# Patient Record
Sex: MALE | Race: WHITE | NOT HISPANIC OR LATINO | Employment: OTHER | URBAN - METROPOLITAN AREA
[De-identification: names, ages, dates, MRNs, and addresses within clinical notes are randomized per-mention and may not be internally consistent; named-entity substitution may affect disease eponyms.]

---

## 2017-01-03 ENCOUNTER — LAB VISIT (OUTPATIENT)
Dept: LAB | Facility: HOSPITAL | Age: 67
End: 2017-01-03
Attending: PHYSICIAN ASSISTANT
Payer: MEDICARE

## 2017-01-03 ENCOUNTER — HOSPITAL ENCOUNTER (OUTPATIENT)
Dept: RADIOLOGY | Facility: HOSPITAL | Age: 67
Discharge: HOME | End: 2017-01-03
Attending: INTERNAL MEDICINE
Payer: MEDICARE

## 2017-01-03 ENCOUNTER — INFUSION (OUTPATIENT)
Dept: RHEUMATOLOGY | Facility: HOSPITAL | Age: 67
End: 2017-01-03
Attending: INTERNAL MEDICINE
Payer: MEDICARE

## 2017-01-03 VITALS
TEMPERATURE: 97 F | SYSTOLIC BLOOD PRESSURE: 141 MMHG | HEART RATE: 51 BPM | WEIGHT: 224.88 LBS | DIASTOLIC BLOOD PRESSURE: 87 MMHG | BODY MASS INDEX: 35.22 KG/M2

## 2017-01-03 DIAGNOSIS — Z51.81 MEDICATION MONITORING ENCOUNTER: Chronic | ICD-10-CM

## 2017-01-03 DIAGNOSIS — M06.09 RHEUMATOID ARTHRITIS OF MULTIPLE SITES WITH NEGATIVE RHEUMATOID FACTOR: Primary | ICD-10-CM

## 2017-01-03 DIAGNOSIS — D84.9 IMMUNOCOMPROMISED PATIENT: Chronic | ICD-10-CM

## 2017-01-03 DIAGNOSIS — M06.09 RHEUMATOID ARTHRITIS OF MULTIPLE SITES WITH NEGATIVE RHEUMATOID FACTOR: Chronic | ICD-10-CM

## 2017-01-03 LAB
BASOPHILS # BLD AUTO: 0.03 K/UL
BASOPHILS NFR BLD: 0.6 %
DIFFERENTIAL METHOD: ABNORMAL
EOSINOPHIL # BLD AUTO: 0.1 K/UL
EOSINOPHIL NFR BLD: 2.7 %
ERYTHROCYTE [DISTWIDTH] IN BLOOD BY AUTOMATED COUNT: 13.4 %
HCT VFR BLD AUTO: 40.6 %
HGB BLD-MCNC: 14.2 G/DL
LYMPHOCYTES # BLD AUTO: 1.3 K/UL
LYMPHOCYTES NFR BLD: 25.7 %
MCH RBC QN AUTO: 32 PG
MCHC RBC AUTO-ENTMCNC: 35 %
MCV RBC AUTO: 91 FL
MONOCYTES # BLD AUTO: 0.7 K/UL
MONOCYTES NFR BLD: 12.8 %
NEUTROPHILS # BLD AUTO: 3 K/UL
NEUTROPHILS NFR BLD: 58.2 %
PLATELET # BLD AUTO: 197 K/UL
PMV BLD AUTO: 11.2 FL
RBC # BLD AUTO: 4.44 M/UL
WBC # BLD AUTO: 5.17 K/UL

## 2017-01-03 PROCEDURE — 73130 X-RAY EXAM OF HAND: CPT | Mod: 26,50,, | Performed by: RADIOLOGY

## 2017-01-03 PROCEDURE — 85025 COMPLETE CBC W/AUTO DIFF WBC: CPT | Mod: PO

## 2017-01-03 PROCEDURE — 73630 X-RAY EXAM OF FOOT: CPT | Mod: 26,50,, | Performed by: RADIOLOGY

## 2017-01-03 PROCEDURE — 36415 COLL VENOUS BLD VENIPUNCTURE: CPT | Mod: PO

## 2017-01-03 RX ORDER — SODIUM CHLORIDE 0.9 % (FLUSH) 0.9 %
10 SYRINGE (ML) INJECTION
Status: CANCELLED | OUTPATIENT
Start: 2017-01-03

## 2017-01-03 RX ORDER — SODIUM CHLORIDE 0.9 % (FLUSH) 0.9 %
10 SYRINGE (ML) INJECTION
Status: DISCONTINUED | OUTPATIENT
Start: 2017-01-03 | End: 2017-01-03 | Stop reason: HOSPADM

## 2017-01-03 RX ADMIN — SODIUM CHLORIDE, PRESERVATIVE FREE 10 ML: 5 INJECTION INTRAVENOUS at 10:01

## 2017-01-03 RX ADMIN — SODIUM CHLORIDE 1000 MG: 9 INJECTION, SOLUTION INTRAVENOUS at 10:01

## 2017-01-03 NOTE — PROGRESS NOTES
Infusion# >10  S/S infection noted or voiced? No  Recent labs? 1/3/16    Premeds? None    Orencia 1000 mg administered IV at a 30 minute rate per orders; see MAR and vitals for more  details. Tolerated well without adverse events, discharged and ambulatory out of clinic.

## 2017-01-03 NOTE — PLAN OF CARE
Problem: General Plan of Care  Goal: Plan of Care Review  The patient and/or their representative will communicate an understanding of their plan of care.   1. Psoriatic Arthritis  2. Patient request IV in hand   Outcome: Ongoing (interventions implemented as appropriate)  Pt. Verbalized understanding of care plan

## 2017-01-31 ENCOUNTER — INFUSION (OUTPATIENT)
Dept: RHEUMATOLOGY | Facility: HOSPITAL | Age: 67
End: 2017-01-31
Attending: PHYSICIAN ASSISTANT
Payer: MEDICARE

## 2017-01-31 VITALS
HEART RATE: 56 BPM | DIASTOLIC BLOOD PRESSURE: 80 MMHG | RESPIRATION RATE: 18 BRPM | WEIGHT: 227.94 LBS | BODY MASS INDEX: 35.7 KG/M2 | TEMPERATURE: 97 F | SYSTOLIC BLOOD PRESSURE: 128 MMHG

## 2017-01-31 DIAGNOSIS — M06.09 RHEUMATOID ARTHRITIS OF MULTIPLE SITES WITH NEGATIVE RHEUMATOID FACTOR: Primary | ICD-10-CM

## 2017-01-31 PROCEDURE — 25000003 PHARM REV CODE 250: Mod: PO | Performed by: PHYSICIAN ASSISTANT

## 2017-01-31 PROCEDURE — 63600175 PHARM REV CODE 636 W HCPCS: Mod: PO | Performed by: PHYSICIAN ASSISTANT

## 2017-01-31 PROCEDURE — 96413 CHEMO IV INFUSION 1 HR: CPT | Mod: PO

## 2017-01-31 RX ORDER — SODIUM CHLORIDE 0.9 % (FLUSH) 0.9 %
10 SYRINGE (ML) INJECTION
Status: CANCELLED | OUTPATIENT
Start: 2017-01-31

## 2017-01-31 RX ORDER — SODIUM CHLORIDE 0.9 % (FLUSH) 0.9 %
10 SYRINGE (ML) INJECTION
Status: DISCONTINUED | OUTPATIENT
Start: 2017-01-31 | End: 2017-01-31 | Stop reason: HOSPADM

## 2017-01-31 RX ADMIN — SODIUM CHLORIDE 1000 MG: 9 INJECTION, SOLUTION INTRAVENOUS at 08:01

## 2017-01-31 RX ADMIN — SODIUM CHLORIDE, PRESERVATIVE FREE 10 ML: 5 INJECTION INTRAVENOUS at 08:01

## 2017-01-31 NOTE — PLAN OF CARE
Problem: General Plan of Care  Goal: Plan of Care Review  The patient and/or their representative will communicate an understanding of their plan of care.   1. Psoriatic Arthritis  2. Patient request IV in hand   Outcome: Ongoing (interventions implemented as appropriate)  Pt verbalized understanding of POT

## 2017-01-31 NOTE — MR AVS SNAPSHOT
[facility]  [address] [place] [address]  Phone: [phone]  Fax: [phone]                  Thomas Sarkar   2017 8:30 AM   Infusion    Description:  Male : 1950   Provider:  RHEUMATOLOGY, INFUSION   Department:  [facility]           Diagnoses this Visit        Comments    Rheumatoid arthritis of multiple sites with negative rheumatoid factor    -  Primary            To Do List           Future Appointments        Provider Department Dept Phone    2017 7:55 AM LABORATORY, [place] [facility] [phone]    2017 8:30 AM Juanita Beckham PA-C [facility] Rheumatology [phone]    2017 9:00 AM RHEUMATOLOGY, INFUSION [facility] [phone]    3/28/2017 8:30 AM RHEUMATOLOGY, INFUSION [facility] [phone]      Goals (5 Years of Data)     None      [facility]     [facility] -  Assistance  Registered nurses in the [facility] provide clinical advisement, health education, appointment booking, and other advisory services.  Call for this free service at [phone].             Medications           Message regarding Medications     Verify the changes and/or additions to your medication regime listed below are the same as discussed with your clinician today.  If any of these changes or additions are incorrect, please notify your healthcare provider.        These medications were administered today        Dose Freq    sodium chloride 0.9% flush 10 mL 10 mL As needed (PRN)    Sig: Inject 10 mLs into the vein as needed for Line Care.    Class: Normal    Route: Intravenous    Non-formulary Exception Code: Specific indication for non-formulary alternative    abatacept (with maltose) (ORENCIA) 1,000 mg in sodium chloride 0.9% 100 mL IVPB 1,000 mg Once    Sig: Inject 1,000 mg into the vein once.    Class: Normal    Route: Intravenous  "          Verify that the below list of medications is an accurate representation of the medications you are currently taking.  If none reported, the list may be blank. If incorrect, please contact your healthcare provider. Carry this list with you in case of emergency.           Current Medications     aspirin (ECOTRIN) 81 MG EC tablet Take 81 mg by mouth once daily.    BD LUER-CECILIA SYRINGE 3 mL 26 x 5/8" Syrg USE TO INJECT 0.8 ML (20 MG ) OF METHOTREXATE UNDER THE SKIN EVERY 7 DAYS    CRESTOR 20 mg tablet 1 tablet.    fish oil-omega-3 fatty acids 300-1,000 mg capsule Take 1 g by mouth once daily.    folic acid (FOLVITE) 1 MG tablet Take 1 tablet (1,000 mcg total) by mouth once daily.    gabapentin (NEURONTIN) 300 MG capsule Take 1 capsule (300 mg total) by mouth 3 (three) times daily.    gabapentin (NEURONTIN) 300 MG capsule TAKE 1 CAPSULE THREE TIMES A DAY    hydrocodone-acetaminophen 7.5-325mg (NORCO) 7.5-325 mg per tablet 1 tablet as needed.    methotrexate 25 mg/mL injection INJECT 0.8 ML (20 MG TOTAL) INTO THE SKIN EVERY 7 DAYS    methylcellulose oral powder Take 3.4 g by mouth once daily.    metoprolol succinate (TOPROL-XL) 50 MG 24 hr tablet 1 tablet.    minocycline (MINOCIN,DYNACIN) 50 MG Cap     multivitamin with minerals tablet Take 1 tablet by mouth once daily.    mupirocin (BACTROBAN) 2 % ointment     pantoprazole (PROTONIX) 40 MG tablet 40 mg once daily.    RESTASIS 0.05 % ophthalmic emulsion     TAMSULOSIN HCL (FLOMAX ORAL) Take by mouth.    triamcinolone acetonide 0.025% (KENALOG) 0.025 % cream Apply topically 2 (two) times daily.           Clinical Reference Information           Vital Signs - Last Recorded  Most recent update: 1/31/2017  8:33 AM by Kiah Martinez RN    BP Pulse Temp Resp Wt BMI    126/75 61 97 °F (36.1 °C) 18 103.4 kg (227 lb 15.3 oz) 35.7 kg/m2      Allergies as of 1/31/2017     Bactrim [Sulfamethoxazole-trimethoprim]    Iodinated Contrast Media - Iv Dye    Prednisone    " Tetanus Vaccines And Toxoid    Corticosteroids (Glucocorticoids)      Immunizations Administered on Date of Encounter - 1/31/2017     None      Orders Placed During Today's Visit      Normal Orders This Visit    Medication Pre-Authorization     Nursing communication     Nursing communication     Saline lock IV       Administrations This Visit     abatacept (with maltose) (ORENCIA) 1,000 mg in sodium chloride 0.9% 100 mL IVPB     Admin Date Action Dose Rate Route Administered By          01/31/2017 New Bag 1000 mg 200 mL/hr Intravenous Kiah Martinez, CHRISTA                   sodium chloride 0.9% flush 10 mL     Admin Date Action Dose Route Administered By             01/31/2017 Given 10 mL Intravenous Kiah Martinez, CHRISTA                      Instructions      Abatacept Solution for injection  What is this medicine?  ABATACEPT (a ba TA sept) is used to treat moderate to severe active rheumatoid arthritis in adults. This medicine is also used to treat juvenile idiopathic arthritis.  This medicine may be used for other purposes; ask your health care provider or pharmacist if you have questions.  What should I tell my health care provider before I take this medicine?  They need to know if you have any of these conditions:  · are taking other medicines to treat rheumatoid arthritis  · COPD  · diabetes  · infection or history of infections  · recently received or scheduled to receive a vaccine  · scheduled to have surgery  · tuberculosis, a positive skin test for tuberculosis or have recently been in close contact with someone who has tuberculosis  · viral hepatitis  · an unusual or allergic reaction to abatacept, other medicines, foods, dyes, or preservatives  · pregnant or trying to get pregnant  · breast-feeding  How should I use this medicine?  This medicine is for infusion into a vein or for injection under the skin. Infusions are given by a health care professional in a hospital or clinic setting. If you are to  give your own medicine at home, you will be taught how to prepare and give this medicine under the skin. Use exactly as directed. Take your medicine at regular intervals. Do not take your medicine more often than directed.  It is important that you put your used needles and syringes in a special sharps container. Do not put them in a trash can. If you do not have a sharps container, call your pharmacist or healthcare provider to get one.  Talk to your pediatrician regarding the use of this medicine in children. While infusions in a clinic may be prescribed for children as young as 6 years for selected conditions, precautions do apply.  Overdosage: If you think you've taken too much of this medicine contact a poison control center or emergency room at once.  NOTE: This medicine is only for you. Do not share this medicine with others.  What if I miss a dose?  This medicine is used once a week if given by injection under the skin. If you miss a dose, take it as soon as you can. If it is almost time for your next dose, take only that dose. Do not take double or extra doses.  If you are to be given an infusion, it is important not to miss your dose. Doses are usually every 4 weeks. Call your doctor or health care professional if you are unable to keep an appointment.  What may interact with this medicine?  Do not take this medicine with any of the following medications:  · adalimumab  · anakinra  · certolizumab  · etanercept  · golimumab  · infliximab  · live virus vaccines  · rituximab  · tocilizumab  This medicine may also interact with the following medications:  · vaccines  This list may not describe all possible interactions. Give your health care provider a list of all the medicines, herbs, non-prescription drugs, or dietary supplements you use. Also tell them if you smoke, drink alcohol, or use illegal drugs. Some items may interact with your medicine.  What should I watch for while using this medicine?  Visit  your doctor for regular check ups while you are taking this medicine. Tell your doctor or healthcare professional if your symptoms do not start to get better or if they get worse.  Call your doctor or health care professional if you get a cold or other infection while receiving this medicine. Do not treat yourself. This medicine may decrease your body's ability to fight infection. Try to avoid being around people who are sick.  What side effects may I notice from receiving this medicine?  Side effects that you should report to your doctor or health care professional as soon as possible:  · allergic reactions like skin rash, itching or hives, swelling of the face, lips, or tongue  · breathing problems  · chest pain  · signs of infection - fever or chills, cough, unusual tiredness, pain or trouble passing urine, or warm, red or painful skin  Side effects that usually do not require medical attention (Report these to your doctor or health care professional if they continue or are bothersome.):  · dizziness  · headache  · nausea, vomiting  · sore throat  · stomach upset  This list may not describe all possible side effects. Call your doctor for medical advice about side effects. You may report side effects to FDA at 5-396-FDA-4798.  Where should I keep my medicine?  Infusions will be given in a hospital or clinic and will not be stored at home.  Storage for syringes given under the skin and stored at home:  Keep out of the reach of children. Store in a refrigerator between 2 and 8 degrees C (36 and 46 degrees F). Keep this medicine in the original container. Protect from light. Do not freeze. Throw away any unused medicine after the expiration date.  NOTE: This sheet is a summary. It may not cover all possible information. If you have questions about this medicine, talk to your doctor, pharmacist, or health care provider.  NOTE:This sheet is a summary. It may not cover all possible information. If you have questions  about this medicine, talk to your doctor, pharmacist, or health care provider. Copyright© 2016 [employer]        Abatacept Solution for injection  What is this medicine?  ABATACEPT (a ba TA sept) is used to treat moderate to severe active rheumatoid arthritis in adults. This medicine is also used to treat juvenile idiopathic arthritis.  This medicine may be used for other purposes; ask your health care provider or pharmacist if you have questions.  What should I tell my health care provider before I take this medicine?  They need to know if you have any of these conditions:  · are taking other medicines to treat rheumatoid arthritis  · COPD  · diabetes  · infection or history of infections  · recently received or scheduled to receive a vaccine  · scheduled to have surgery  · tuberculosis, a positive skin test for tuberculosis or have recently been in close contact with someone who has tuberculosis  · viral hepatitis  · an unusual or allergic reaction to abatacept, other medicines, foods, dyes, or preservatives  · pregnant or trying to get pregnant  · breast-feeding  How should I use this medicine?  This medicine is for infusion into a vein or for injection under the skin. Infusions are given by a health care professional in a hospital or clinic setting. If you are to give your own medicine at home, you will be taught how to prepare and give this medicine under the skin. Use exactly as directed. Take your medicine at regular intervals. Do not take your medicine more often than directed.  It is important that you put your used needles and syringes in a special sharps container. Do not put them in a trash can. If you do not have a sharps container, call your pharmacist or healthcare provider to get one.  Talk to your pediatrician regarding the use of this medicine in children. While infusions in a clinic may be prescribed for children as young as 6 years for selected conditions, precautions do apply.  Overdosage: If  you think you've taken too much of this medicine contact a poison control center or emergency room at once.  NOTE: This medicine is only for you. Do not share this medicine with others.  What if I miss a dose?  This medicine is used once a week if given by injection under the skin. If you miss a dose, take it as soon as you can. If it is almost time for your next dose, take only that dose. Do not take double or extra doses.  If you are to be given an infusion, it is important not to miss your dose. Doses are usually every 4 weeks. Call your doctor or health care professional if you are unable to keep an appointment.  What may interact with this medicine?  Do not take this medicine with any of the following medications:  · adalimumab  · anakinra  · certolizumab  · etanercept  · golimumab  · infliximab  · live virus vaccines  · rituximab  · tocilizumab  This medicine may also interact with the following medications:  · vaccines  This list may not describe all possible interactions. Give your health care provider a list of all the medicines, herbs, non-prescription drugs, or dietary supplements you use. Also tell them if you smoke, drink alcohol, or use illegal drugs. Some items may interact with your medicine.  What should I watch for while using this medicine?  Visit your doctor for regular check ups while you are taking this medicine. Tell your doctor or healthcare professional if your symptoms do not start to get better or if they get worse.  Call your doctor or health care professional if you get a cold or other infection while receiving this medicine. Do not treat yourself. This medicine may decrease your body's ability to fight infection. Try to avoid being around people who are sick.  What side effects may I notice from receiving this medicine?  Side effects that you should report to your doctor or health care professional as soon as possible:  · allergic reactions like skin rash, itching or hives, swelling of  the face, lips, or tongue  · breathing problems  · chest pain  · signs of infection - fever or chills, cough, unusual tiredness, pain or trouble passing urine, or warm, red or painful skin  Side effects that usually do not require medical attention (Report these to your doctor or health care professional if they continue or are bothersome.):  · dizziness  · headache  · nausea, vomiting  · sore throat  · stomach upset  This list may not describe all possible side effects. Call your doctor for medical advice about side effects. You may report side effects to FDA at 1-797-FDA-6391.  Where should I keep my medicine?  Infusions will be given in a hospital or clinic and will not be stored at home.  Storage for syringes given under the skin and stored at home:  Keep out of the reach of children. Store in a refrigerator between 2 and 8 degrees C (36 and 46 degrees F). Keep this medicine in the original container. Protect from light. Do not freeze. Throw away any unused medicine after the expiration date.  NOTE: This sheet is a summary. It may not cover all possible information. If you have questions about this medicine, talk to your doctor, pharmacist, or health care provider.  NOTE:This sheet is a summary. It may not cover all possible information. If you have questions about this medicine, talk to your doctor, pharmacist, or health care provider. Copyright© 2016 [employer]

## 2017-01-31 NOTE — PROGRESS NOTES
Infusion# >10  S/S infection noted or voiced? None noted/denied  Recent labs? yes    Premeds? none    Orencia 1000 mg administered IV at a 30 minute rate per orders; see MAR and vitals for more  details. Tolerated well without adverse events, discharged and ambulatory out of clinic.

## 2017-01-31 NOTE — PATIENT INSTRUCTIONS
Abatacept Solution for injection  What is this medicine?  ABATACEPT (a ba TA sept) is used to treat moderate to severe active rheumatoid arthritis in adults. This medicine is also used to treat juvenile idiopathic arthritis.  This medicine may be used for other purposes; ask your health care provider or pharmacist if you have questions.  What should I tell my health care provider before I take this medicine?  They need to know if you have any of these conditions:  · are taking other medicines to treat rheumatoid arthritis  · COPD  · diabetes  · infection or history of infections  · recently received or scheduled to receive a vaccine  · scheduled to have surgery  · tuberculosis, a positive skin test for tuberculosis or have recently been in close contact with someone who has tuberculosis  · viral hepatitis  · an unusual or allergic reaction to abatacept, other medicines, foods, dyes, or preservatives  · pregnant or trying to get pregnant  · breast-feeding  How should I use this medicine?  This medicine is for infusion into a vein or for injection under the skin. Infusions are given by a health care professional in a hospital or clinic setting. If you are to give your own medicine at home, you will be taught how to prepare and give this medicine under the skin. Use exactly as directed. Take your medicine at regular intervals. Do not take your medicine more often than directed.  It is important that you put your used needles and syringes in a special sharps container. Do not put them in a trash can. If you do not have a sharps container, call your pharmacist or healthcare provider to get one.  Talk to your pediatrician regarding the use of this medicine in children. While infusions in a clinic may be prescribed for children as young as 6 years for selected conditions, precautions do apply.  Overdosage: If you think you've taken too much of this medicine contact a poison control center or emergency room at  once.  NOTE: This medicine is only for you. Do not share this medicine with others.  What if I miss a dose?  This medicine is used once a week if given by injection under the skin. If you miss a dose, take it as soon as you can. If it is almost time for your next dose, take only that dose. Do not take double or extra doses.  If you are to be given an infusion, it is important not to miss your dose. Doses are usually every 4 weeks. Call your doctor or health care professional if you are unable to keep an appointment.  What may interact with this medicine?  Do not take this medicine with any of the following medications:  · adalimumab  · anakinra  · certolizumab  · etanercept  · golimumab  · infliximab  · live virus vaccines  · rituximab  · tocilizumab  This medicine may also interact with the following medications:  · vaccines  This list may not describe all possible interactions. Give your health care provider a list of all the medicines, herbs, non-prescription drugs, or dietary supplements you use. Also tell them if you smoke, drink alcohol, or use illegal drugs. Some items may interact with your medicine.  What should I watch for while using this medicine?  Visit your doctor for regular check ups while you are taking this medicine. Tell your doctor or healthcare professional if your symptoms do not start to get better or if they get worse.  Call your doctor or health care professional if you get a cold or other infection while receiving this medicine. Do not treat yourself. This medicine may decrease your body's ability to fight infection. Try to avoid being around people who are sick.  What side effects may I notice from receiving this medicine?  Side effects that you should report to your doctor or health care professional as soon as possible:  · allergic reactions like skin rash, itching or hives, swelling of the face, lips, or tongue  · breathing problems  · chest pain  · signs of infection - fever or chills,  cough, unusual tiredness, pain or trouble passing urine, or warm, red or painful skin  Side effects that usually do not require medical attention (Report these to your doctor or health care professional if they continue or are bothersome.):  · dizziness  · headache  · nausea, vomiting  · sore throat  · stomach upset  This list may not describe all possible side effects. Call your doctor for medical advice about side effects. You may report side effects to FDA at 2-614-BPC-3508.  Where should I keep my medicine?  Infusions will be given in a hospital or clinic and will not be stored at home.  Storage for syringes given under the skin and stored at home:  Keep out of the reach of children. Store in a refrigerator between 2 and 8 degrees C (36 and 46 degrees F). Keep this medicine in the original container. Protect from light. Do not freeze. Throw away any unused medicine after the expiration date.  NOTE: This sheet is a summary. It may not cover all possible information. If you have questions about this medicine, talk to your doctor, pharmacist, or health care provider.  NOTE:This sheet is a summary. It may not cover all possible information. If you have questions about this medicine, talk to your doctor, pharmacist, or health care provider. Copyright© 2016 [employer]        Abatacept Solution for injection  What is this medicine?  ABATACEPT (a ba TA sept) is used to treat moderate to severe active rheumatoid arthritis in adults. This medicine is also used to treat juvenile idiopathic arthritis.  This medicine may be used for other purposes; ask your health care provider or pharmacist if you have questions.  What should I tell my health care provider before I take this medicine?  They need to know if you have any of these conditions:  · are taking other medicines to treat rheumatoid arthritis  · COPD  · diabetes  · infection or history of infections  · recently received or scheduled to receive a  vaccine  · scheduled to have surgery  · tuberculosis, a positive skin test for tuberculosis or have recently been in close contact with someone who has tuberculosis  · viral hepatitis  · an unusual or allergic reaction to abatacept, other medicines, foods, dyes, or preservatives  · pregnant or trying to get pregnant  · breast-feeding  How should I use this medicine?  This medicine is for infusion into a vein or for injection under the skin. Infusions are given by a health care professional in a hospital or clinic setting. If you are to give your own medicine at home, you will be taught how to prepare and give this medicine under the skin. Use exactly as directed. Take your medicine at regular intervals. Do not take your medicine more often than directed.  It is important that you put your used needles and syringes in a special sharps container. Do not put them in a trash can. If you do not have a sharps container, call your pharmacist or healthcare provider to get one.  Talk to your pediatrician regarding the use of this medicine in children. While infusions in a clinic may be prescribed for children as young as 6 years for selected conditions, precautions do apply.  Overdosage: If you think you've taken too much of this medicine contact a poison control center or emergency room at once.  NOTE: This medicine is only for you. Do not share this medicine with others.  What if I miss a dose?  This medicine is used once a week if given by injection under the skin. If you miss a dose, take it as soon as you can. If it is almost time for your next dose, take only that dose. Do not take double or extra doses.  If you are to be given an infusion, it is important not to miss your dose. Doses are usually every 4 weeks. Call your doctor or health care professional if you are unable to keep an appointment.  What may interact with this medicine?  Do not take this medicine with any of the following  medications:  · adalimumab  · anakinra  · certolizumab  · etanercept  · golimumab  · infliximab  · live virus vaccines  · rituximab  · tocilizumab  This medicine may also interact with the following medications:  · vaccines  This list may not describe all possible interactions. Give your health care provider a list of all the medicines, herbs, non-prescription drugs, or dietary supplements you use. Also tell them if you smoke, drink alcohol, or use illegal drugs. Some items may interact with your medicine.  What should I watch for while using this medicine?  Visit your doctor for regular check ups while you are taking this medicine. Tell your doctor or healthcare professional if your symptoms do not start to get better or if they get worse.  Call your doctor or health care professional if you get a cold or other infection while receiving this medicine. Do not treat yourself. This medicine may decrease your body's ability to fight infection. Try to avoid being around people who are sick.  What side effects may I notice from receiving this medicine?  Side effects that you should report to your doctor or health care professional as soon as possible:  · allergic reactions like skin rash, itching or hives, swelling of the face, lips, or tongue  · breathing problems  · chest pain  · signs of infection - fever or chills, cough, unusual tiredness, pain or trouble passing urine, or warm, red or painful skin  Side effects that usually do not require medical attention (Report these to your doctor or health care professional if they continue or are bothersome.):  · dizziness  · headache  · nausea, vomiting  · sore throat  · stomach upset  This list may not describe all possible side effects. Call your doctor for medical advice about side effects. You may report side effects to FDA at 6-893-FDA-9936.  Where should I keep my medicine?  Infusions will be given in a hospital or clinic and will not be stored at home.  Storage for  syringes given under the skin and stored at home:  Keep out of the reach of children. Store in a refrigerator between 2 and 8 degrees C (36 and 46 degrees F). Keep this medicine in the original container. Protect from light. Do not freeze. Throw away any unused medicine after the expiration date.  NOTE: This sheet is a summary. It may not cover all possible information. If you have questions about this medicine, talk to your doctor, pharmacist, or health care provider.  NOTE:This sheet is a summary. It may not cover all possible information. If you have questions about this medicine, talk to your doctor, pharmacist, or health care provider. Copyright© 2016 [employer] Standard

## 2017-02-06 RX ORDER — FOLIC ACID 1 MG/1
TABLET ORAL
Qty: 90 TABLET | Refills: 3 | Status: SHIPPED | OUTPATIENT
Start: 2017-02-06 | End: 2018-02-13 | Stop reason: SDUPTHER

## 2017-02-28 ENCOUNTER — INFUSION (OUTPATIENT)
Dept: RHEUMATOLOGY | Facility: HOSPITAL | Age: 67
End: 2017-02-28
Attending: PHYSICIAN ASSISTANT
Payer: MEDICARE

## 2017-02-28 ENCOUNTER — OFFICE VISIT (OUTPATIENT)
Dept: RHEUMATOLOGY | Facility: CLINIC | Age: 67
End: 2017-02-28
Payer: MEDICARE

## 2017-02-28 ENCOUNTER — LAB VISIT (OUTPATIENT)
Dept: LAB | Facility: HOSPITAL | Age: 67
End: 2017-02-28
Attending: PHYSICIAN ASSISTANT
Payer: MEDICARE

## 2017-02-28 VITALS
SYSTOLIC BLOOD PRESSURE: 140 MMHG | WEIGHT: 226.63 LBS | DIASTOLIC BLOOD PRESSURE: 68 MMHG | HEIGHT: 67 IN | TEMPERATURE: 98 F | BODY MASS INDEX: 35.57 KG/M2 | HEART RATE: 63 BPM

## 2017-02-28 VITALS
HEART RATE: 62 BPM | WEIGHT: 226.63 LBS | BODY MASS INDEX: 35.5 KG/M2 | SYSTOLIC BLOOD PRESSURE: 143 MMHG | DIASTOLIC BLOOD PRESSURE: 85 MMHG

## 2017-02-28 DIAGNOSIS — M06.09 RHEUMATOID ARTHRITIS OF MULTIPLE SITES WITH NEGATIVE RHEUMATOID FACTOR: Chronic | ICD-10-CM

## 2017-02-28 DIAGNOSIS — K52.9 CHRONIC COLITIS: Chronic | ICD-10-CM

## 2017-02-28 DIAGNOSIS — M06.09 RHEUMATOID ARTHRITIS OF MULTIPLE SITES WITH NEGATIVE RHEUMATOID FACTOR: Primary | Chronic | ICD-10-CM

## 2017-02-28 DIAGNOSIS — E66.9 OBESITY, CLASS II, BMI 35-39.9, NO COMORBIDITY: ICD-10-CM

## 2017-02-28 DIAGNOSIS — D84.9 IMMUNOCOMPROMISED PATIENT: Chronic | ICD-10-CM

## 2017-02-28 DIAGNOSIS — L40.9 PSORIASIS: Chronic | ICD-10-CM

## 2017-02-28 DIAGNOSIS — Z51.81 MEDICATION MONITORING ENCOUNTER: Chronic | ICD-10-CM

## 2017-02-28 DIAGNOSIS — M06.09 RHEUMATOID ARTHRITIS OF MULTIPLE SITES WITH NEGATIVE RHEUMATOID FACTOR: Primary | ICD-10-CM

## 2017-02-28 DIAGNOSIS — M17.0 PRIMARY OSTEOARTHRITIS OF BOTH KNEES: ICD-10-CM

## 2017-02-28 LAB
ALBUMIN SERPL BCP-MCNC: 3.6 G/DL
ALP SERPL-CCNC: 75 U/L
ALT SERPL W/O P-5'-P-CCNC: 45 U/L
ANION GAP SERPL CALC-SCNC: 10 MMOL/L
AST SERPL-CCNC: 38 U/L
BASOPHILS # BLD AUTO: 0.03 K/UL
BASOPHILS NFR BLD: 0.6 %
BILIRUB SERPL-MCNC: 1.1 MG/DL
BUN SERPL-MCNC: 19 MG/DL
CALCIUM SERPL-MCNC: 9.2 MG/DL
CHLORIDE SERPL-SCNC: 106 MMOL/L
CO2 SERPL-SCNC: 23 MMOL/L
CREAT SERPL-MCNC: 1 MG/DL
CRP SERPL-MCNC: 2.5 MG/L
DIFFERENTIAL METHOD: ABNORMAL
EOSINOPHIL # BLD AUTO: 0.2 K/UL
EOSINOPHIL NFR BLD: 3.3 %
ERYTHROCYTE [DISTWIDTH] IN BLOOD BY AUTOMATED COUNT: 13.7 %
ERYTHROCYTE [SEDIMENTATION RATE] IN BLOOD BY WESTERGREN METHOD: 23 MM/HR
EST. GFR  (AFRICAN AMERICAN): >60 ML/MIN/1.73 M^2
EST. GFR  (NON AFRICAN AMERICAN): >60 ML/MIN/1.73 M^2
GLUCOSE SERPL-MCNC: 106 MG/DL
HCT VFR BLD AUTO: 38.7 %
HGB BLD-MCNC: 13.3 G/DL
LYMPHOCYTES # BLD AUTO: 1.2 K/UL
LYMPHOCYTES NFR BLD: 24.4 %
MCH RBC QN AUTO: 31.5 PG
MCHC RBC AUTO-ENTMCNC: 34.4 %
MCV RBC AUTO: 92 FL
MONOCYTES # BLD AUTO: 0.8 K/UL
MONOCYTES NFR BLD: 15.6 %
NEUTROPHILS # BLD AUTO: 2.7 K/UL
NEUTROPHILS NFR BLD: 56.1 %
PLATELET # BLD AUTO: 201 K/UL
PMV BLD AUTO: 10.7 FL
POTASSIUM SERPL-SCNC: 4.5 MMOL/L
PROT SERPL-MCNC: 7.3 G/DL
RBC # BLD AUTO: 4.22 M/UL
SODIUM SERPL-SCNC: 139 MMOL/L
WBC # BLD AUTO: 4.88 K/UL

## 2017-02-28 PROCEDURE — 80053 COMPREHEN METABOLIC PANEL: CPT | Mod: PO

## 2017-02-28 PROCEDURE — 99999 PR PBB SHADOW E&M-EST. PATIENT-LVL IV: CPT | Mod: PBBFAC,,, | Performed by: PHYSICIAN ASSISTANT

## 2017-02-28 PROCEDURE — 85025 COMPLETE CBC W/AUTO DIFF WBC: CPT | Mod: PO

## 2017-02-28 PROCEDURE — 36415 COLL VENOUS BLD VENIPUNCTURE: CPT | Mod: PO

## 2017-02-28 PROCEDURE — 25000003 PHARM REV CODE 250: Mod: PO | Performed by: PHYSICIAN ASSISTANT

## 2017-02-28 PROCEDURE — 86140 C-REACTIVE PROTEIN: CPT

## 2017-02-28 PROCEDURE — 99214 OFFICE O/P EST MOD 30 MIN: CPT | Mod: S$PBB,,, | Performed by: PHYSICIAN ASSISTANT

## 2017-02-28 PROCEDURE — 96413 CHEMO IV INFUSION 1 HR: CPT | Mod: PO

## 2017-02-28 PROCEDURE — 85651 RBC SED RATE NONAUTOMATED: CPT | Mod: PO

## 2017-02-28 PROCEDURE — 63600175 PHARM REV CODE 636 W HCPCS: Mod: PO | Performed by: PHYSICIAN ASSISTANT

## 2017-02-28 RX ORDER — SODIUM CHLORIDE 0.9 % (FLUSH) 0.9 %
10 SYRINGE (ML) INJECTION
Status: DISCONTINUED | OUTPATIENT
Start: 2017-02-28 | End: 2017-02-28 | Stop reason: HOSPADM

## 2017-02-28 RX ORDER — SODIUM CHLORIDE 0.9 % (FLUSH) 0.9 %
10 SYRINGE (ML) INJECTION
Status: CANCELLED | OUTPATIENT
Start: 2017-02-28

## 2017-02-28 RX ADMIN — SODIUM CHLORIDE 1000 MG: 9 INJECTION, SOLUTION INTRAVENOUS at 08:02

## 2017-02-28 RX ADMIN — Medication 10 ML: at 09:02

## 2017-02-28 ASSESSMENT — CLINICAL DISEASE ACTIVITY INDEX (CDAI)
SWOLLEN_JOINTS_COUNT: 1
PATIENT_ASSESSMENT: 3
PHYSICIAN_ASSESSMENT: 3
TOTAL_SCORE: 13
TENDER_JOINTS_COUNT: 6

## 2017-02-28 ASSESSMENT — ROUTINE ASSESSMENT OF PATIENT INDEX DATA (RAPID3): MDHAQ FUNCTION SCORE: 1.6

## 2017-02-28 NOTE — PROGRESS NOTES
Infusion# >10  S/S infection noted or voiced? no  Recent labs? Today/saw Juanita    Premeds? no    Orencia 1000 mg administered IV at a 30 minute rate per orders; see MAR and vitals for more  details. Tolerated well without adverse events, discharged and ambulatory out of clinic.

## 2017-02-28 NOTE — PROGRESS NOTES
Subjective:       Patient ID: Thomas Sarkar is a 66 y.o. male.    Chief Complaint: Rheumatoid Arthritis; Psoriasis; and Osteoarthritis      HPI Comments: Thomas is back today for rheumatology followup. We have followed thomas for seronegative RA with psoriasis overlap and chronic unspecified Colitis. He is followed by GI. His gi issues have been very quiet. His joints stanley both hands have been the predominate issue.  Failed mtx monotherapy. Failed sulfasalazine. He has failed multiple tnf agents (remicade, enbrel, humira and cimzia), now on IV Orecnica 1000 mg Q 4 weeks just over 1 year now. Also on parental mtx 20 mg (0.8 mL) once weekly and folic acid. Cannot take nsaids but was on limbrel daily. Now off due to cost of medication and medicare not willing to cover. Dry eyes now on restasis.  Overall he reports the Orencia has worked the best of all the meds. Today pain is 4/10.  Hands. Scalp psoriasis comes and goes, scalp pretty clear right now. Reports diverticulitis in the past.  previously used oral  hydrocodone 10/325  but not really helping so he stopped.  Now on gabapentin 300 mg tid to help some to reduce pain. No side effects. Had upper resp infection treated with azithromycin 5 day course. Completed treatment. Skipped his mtx last week. Feeling better. Congestion better. No fever.             Psoriasis   Associated symptoms include arthralgias, joint swelling and a rash. Pertinent negatives include no abdominal pain, chest pain, chills, congestion, coughing, fatigue, fever, nausea, numbness, vomiting or weakness.   Osteoarthritis   Associated symptoms include arthralgias, joint swelling and a rash. Pertinent negatives include no abdominal pain, chest pain, chills, congestion, coughing, fatigue, fever, nausea, numbness, vomiting or weakness.     Review of Systems   Constitutional: Negative.  Negative for chills, fatigue and fever.   HENT: Negative.  Negative for congestion, mouth sores and trouble  "swallowing.    Eyes: Negative.  Negative for photophobia, redness and visual disturbance.   Respiratory: Negative.  Negative for cough, shortness of breath and wheezing.    Cardiovascular: Negative.  Negative for chest pain and leg swelling.   Gastrointestinal: Negative.  Negative for abdominal distention, abdominal pain, anal bleeding, blood in stool, constipation, diarrhea, nausea, rectal pain and vomiting.   Genitourinary: Negative.  Negative for dysuria, flank pain, frequency and urgency.   Musculoskeletal: Positive for arthralgias and joint swelling. Negative for gait problem.   Skin: Positive for rash. Negative for color change and pallor.   Neurological: Negative.  Negative for dizziness, weakness and numbness.         Objective:     BP (!) 140/68  Pulse 63  Temp 98.2 °F (36.8 °C) (Oral)   Ht 5' 7" (1.702 m)  Wt 102.8 kg (226 lb 10.1 oz)  BMI 35.5 kg/m2     Physical Exam   Constitutional: He is oriented to person, place, and time and well-developed, well-nourished, and in no distress. No distress.   HENT:   Head: Normocephalic and atraumatic.   Right Ear: External ear normal.   Left Ear: External ear normal.   Mouth/Throat: No oropharyngeal exudate.   Eyes: Conjunctivae and EOM are normal. Pupils are equal, round, and reactive to light. No scleral icterus.   Neck: Neck supple. No thyromegaly present.   Cardiovascular: Normal rate, regular rhythm and normal heart sounds.    No murmur heard.  Pulmonary/Chest: Effort normal and breath sounds normal. No respiratory distress.   Abdominal: Soft. Bowel sounds are normal.       Right Side Rheumatological Exam     Examination finds the shoulder, elbow, wrist, knee, 1st PIP, 1st MCP, 2nd PIP, 4th PIP, 4th MCP, 5th PIP and 5th MCP normal.    The patient is tender to palpation of the 2nd MCP, 3rd PIP and 3rd MCP    Left Side Rheumatological Exam     Examination finds the shoulder, elbow, wrist, knee, 1st PIP, 1st MCP, 2nd PIP, 3rd MCP, 4th MCP and 5th MCP " normal.    The patient is tender to palpation of the 2nd MCP and 3rd PIP.      Lymphadenopathy:     He has no cervical adenopathy.   Neurological: He is alert and oriented to person, place, and time. No cranial nerve deficit. He exhibits normal muscle tone. Gait normal. Coordination normal.   Skin: Skin is warm and dry. Rash noted.          Musculoskeletal: He exhibits edema and tenderness.   Puffiness to several fingers and pip joints with tenderness noted on exam  Decreased range of motion pip joints                  Recent Results (from the past 1008 hour(s))   CBC auto differential    Collection Time: 02/28/17  7:45 AM   Result Value Ref Range    WBC 4.88 3.90 - 12.70 K/uL    RBC 4.22 (L) 4.60 - 6.20 M/uL    Hemoglobin 13.3 (L) 14.0 - 18.0 g/dL    Hematocrit 38.7 (L) 40.0 - 54.0 %    MCV 92 82 - 98 fL    MCH 31.5 (H) 27.0 - 31.0 pg    MCHC 34.4 32.0 - 36.0 %    RDW 13.7 11.5 - 14.5 %    Platelets 201 150 - 350 K/uL    MPV 10.7 9.2 - 12.9 fL    Gran # 2.7 1.8 - 7.7 K/uL    Lymph # 1.2 1.0 - 4.8 K/uL    Mono # 0.8 0.3 - 1.0 K/uL    Eos # 0.2 0.0 - 0.5 K/uL    Baso # 0.03 0.00 - 0.20 K/uL    Gran% 56.1 38.0 - 73.0 %    Lymph% 24.4 18.0 - 48.0 %    Mono% 15.6 (H) 4.0 - 15.0 %    Eosinophil% 3.3 0.0 - 8.0 %    Basophil% 0.6 0.0 - 1.9 %    Differential Method Automated          Assessment:       1. Rheumatoid arthritis of multiple sites with negative rheumatoid factor    2. Psoriasis    3. Chronic colitis    4. Immunocompromised patient    5. Medication monitoring encounter    6. Primary osteoarthritis of both knees    7. Obesity, Class II, BMI 35-39.9, no comorbidity            1.  Seronegative rheumatoid arthritis with overlapping skin psoriasis  Who has failed mtx monotheraoy, failed Enbrel, Remicade, Humira, cimzia now on IV Orencia 1000 mg every 4 weeks still ongoing activity  6 Tender/ 2 swollen - cdai 13, RENEE 1.6  2.  Skin psoriasis better on mtx and topical- minimal scalp rash today   3.  Chronic unspecified  colitis  - not UC- Stable  4.  Osteoarthritis of both knees  5.  Vaccines up to date   6.  Medication Monitoring- no current issues, no evidence of toxicity  7.  Immunocompromised no issues with recurrent infections  8.  Chronic pain on gabapentin 300 mg tid   9.  Obesity Class II      Plan:        continue mtx 0.8 mL 20 mg sub Q once weekly injection,    ok for IV Orencia 1000 mg today and continue every 4 weeks,    Keep his  gabapentin at 300 mg tid see if this helps with pain,    if fails Orencia would avoid actemra or xelzanz with his prior diverticulitis, not sure what next ? Rituxan      rtc 4, 8 and 12 weeks infusion only and 16 weeks for infusion, office visit and labs      Repeat kaushik hand and foot X-rays in 1.5-2 years    Patient counseled on weight loss  Recommended dietary changes--Mediterranean diet would be a good one to follow  Encouraged exercise  call with any questions, changes, or concerns

## 2017-02-28 NOTE — MR AVS SNAPSHOT
[facility]  [address] [place] [address]  Phone: [phone]  Fax: [phone]                  Thomas Sarkar   2017 9:00 AM   Infusion    Description:  Male : 1950   Provider:  RHEUMATOLOGY, INFUSION   Department:  [facility]           Diagnoses this Visit        Comments    Rheumatoid arthritis of multiple sites with negative rheumatoid factor    -  Primary            To Do List           Future Appointments        Provider Department Dept Phone    3/28/2017 8:30 AM RHEUMATOLOGY, INFUSION [facility] [phone]    2017 8:30 AM RHEUMATOLOGY, INFUSION [facility] [phone]    2017 8:30 AM RHEUMATOLOGY, INFUSION [facility] [phone]    2017 7:55 AM LABORATORY, [facility] [phone]    2017 8:30 AM Sebastián Douglass MD [facility] Rheumatology [phone]      Goals (5 Years of Data)     None      [facility]     [facility] -  Assistance  Registered nurses in the [facility] provide clinical advisement, health education, appointment booking, and other advisory services.  Call for this free service at [phone].             Medications           Message regarding Medications     Verify the changes and/or additions to your medication regime listed below are the same as discussed with your clinician today.  If any of these changes or additions are incorrect, please notify your healthcare provider.        These medications were administered today        Dose Freq    sodium chloride 0.9% flush 10 mL 10 mL As needed (PRN)    Sig: Inject 10 mLs into the vein as needed for Line Care.    Class: Normal    Route: Intravenous    Non-formulary Exception Code: Specific indication for non-formulary alternative    abatacept (with maltose) (ORENCIA) 1,000 mg in sodium chloride 0.9% 100 mL IVPB 1,000  "mg Once    Sig: Inject 1,000 mg into the vein once.    Class: Normal    Route: Intravenous           Verify that the below list of medications is an accurate representation of the medications you are currently taking.  If none reported, the list may be blank. If incorrect, please contact your healthcare provider. Carry this list with you in case of emergency.           Current Medications     aspirin (ECOTRIN) 81 MG EC tablet Take 81 mg by mouth once daily.    BD LUER-CECILIA SYRINGE 3 mL 26 x 5/8" Syrg USE TO INJECT 0.8 ML (20 MG ) OF METHOTREXATE UNDER THE SKIN EVERY 7 DAYS    CRESTOR 20 mg tablet 1 tablet.    fish oil-omega-3 fatty acids 300-1,000 mg capsule Take 1 g by mouth once daily.    folic acid (FOLVITE) 1 MG tablet TAKE 1 TABLET ONCE DAILY    gabapentin (NEURONTIN) 300 MG capsule Take 1 capsule (300 mg total) by mouth 3 (three) times daily.    hydrocodone-acetaminophen 7.5-325mg (NORCO) 7.5-325 mg per tablet 1 tablet as needed.    methotrexate 25 mg/mL injection INJECT 0.8 ML (20 MG TOTAL) INTO THE SKIN EVERY 7 DAYS    metoprolol succinate (TOPROL-XL) 50 MG 24 hr tablet 1 tablet.    multivitamin with minerals tablet Take 1 tablet by mouth once daily.    mupirocin (BACTROBAN) 2 % ointment     pantoprazole (PROTONIX) 40 MG tablet 40 mg once daily.    RESTASIS 0.05 % ophthalmic emulsion     TAMSULOSIN HCL (FLOMAX ORAL) Take by mouth.    triamcinolone acetonide 0.025% (KENALOG) 0.025 % cream Apply topically 2 (two) times daily.           Clinical Reference Information           Your Vitals Were     BP                   143/85           Allergies as of 2/28/2017     Bactrim [Sulfamethoxazole-trimethoprim]    Iodinated Contrast Media - Iv Dye    Prednisone    Tetanus Vaccines And Toxoid    Corticosteroids (Glucocorticoids)      Immunizations Administered on Date of Encounter - 2/28/2017     None      Orders Placed During Today's Visit      Normal Orders This Visit    Medication Pre-Authorization     "   Administrations This Visit     abatacept (with maltose) (ORENCIA) 1,000 mg in sodium chloride 0.9% 100 mL IVPB     Admin Date Action Dose Rate Route Administered By          02/28/2017 New Bag 1000 mg 200 mL/hr Intravenous Divina Lopez, RN                   sodium chloride 0.9% flush 10 mL     Admin Date Action Dose Route Administered By             02/28/2017 Given 10 mL Intravenous Divina Lopez, RN                      Language Assistance Services     ATTENTION: Language assistance services are available, free of charge. Please call [phone].      ATENCIÓN: Si habla español, tiene a [place] disposición servicios gratuitos de asistencia lingüística. Llame al [phone].     CHÚ Ý: N?u b?n nói Ti?ng Vi?t, có các d?ch v? h? tr? ngôn ng? mi?n phí dành cho b?n. G?i s? [phone].         [facility] complies with applicable Federal civil rights laws and does not discriminate on the basis of race, color, national origin, age, disability, or sex.

## 2017-02-28 NOTE — PLAN OF CARE
Problem: Patient Care Overview (Adult)  Goal: Plan of Care Review  Outcome: Ongoing (interventions implemented as appropriate)  I have generalized pain 4/4

## 2017-03-24 ENCOUNTER — TELEPHONE (OUTPATIENT)
Dept: RHEUMATOLOGY | Facility: HOSPITAL | Age: 67
End: 2017-03-24

## 2017-03-28 ENCOUNTER — INFUSION (OUTPATIENT)
Dept: RHEUMATOLOGY | Facility: HOSPITAL | Age: 67
End: 2017-03-28
Attending: PHYSICIAN ASSISTANT
Payer: MEDICARE

## 2017-03-28 VITALS
TEMPERATURE: 97 F | RESPIRATION RATE: 18 BRPM | HEIGHT: 67 IN | DIASTOLIC BLOOD PRESSURE: 70 MMHG | WEIGHT: 226.63 LBS | HEART RATE: 60 BPM | BODY MASS INDEX: 35.57 KG/M2 | SYSTOLIC BLOOD PRESSURE: 123 MMHG

## 2017-03-28 DIAGNOSIS — M06.09 RHEUMATOID ARTHRITIS OF MULTIPLE SITES WITH NEGATIVE RHEUMATOID FACTOR: Primary | ICD-10-CM

## 2017-03-28 PROCEDURE — 96413 CHEMO IV INFUSION 1 HR: CPT | Mod: PO

## 2017-03-28 PROCEDURE — 63600175 PHARM REV CODE 636 W HCPCS: Mod: PO | Performed by: PHYSICIAN ASSISTANT

## 2017-03-28 PROCEDURE — 25000003 PHARM REV CODE 250: Mod: PO | Performed by: PHYSICIAN ASSISTANT

## 2017-03-28 RX ORDER — SODIUM CHLORIDE 0.9 % (FLUSH) 0.9 %
10 SYRINGE (ML) INJECTION
Status: DISCONTINUED | OUTPATIENT
Start: 2017-03-28 | End: 2017-03-28 | Stop reason: HOSPADM

## 2017-03-28 RX ORDER — SODIUM CHLORIDE 0.9 % (FLUSH) 0.9 %
10 SYRINGE (ML) INJECTION
Status: CANCELLED | OUTPATIENT
Start: 2017-03-28

## 2017-03-28 RX ADMIN — Medication 10 ML: at 08:03

## 2017-03-28 RX ADMIN — SODIUM CHLORIDE 1000 MG: 9 INJECTION, SOLUTION INTRAVENOUS at 08:03

## 2017-03-28 NOTE — PLAN OF CARE
Problem: Infection, Risk/Actual (Adult, Obstetrics)  Goal: Infection Prevention/Resolution/Control (Infection, Risk/Actual)  Patient will demonstrate the desired outcomes.   Outcome: Ongoing (interventions implemented as appropriate)  Infection prevention discussed.

## 2017-03-28 NOTE — PATIENT INSTRUCTIONS
Abatacept Solution for injection  What is this medicine?  ABATACEPT (a ba TA sept) is used to treat moderate to severe active rheumatoid arthritis in adults. This medicine is also used to treat juvenile idiopathic arthritis.  How should I use this medicine?  This medicine is for infusion into a vein. It is given by a health care professional in a hospital or clinic setting.  Talk to your pediatrician regarding the use of this medicine in children. While this drug may be prescribed for children as young as 6 years for selected conditions, precautions do apply.  What side effects may I notice from receiving this medicine?  Side effects that you should report to your doctor or health care professional as soon as possible:  · allergic reactions like skin rash, itching or hives, swelling of the face, lips, or tongue  · breathing problems  · chest pain  · signs of infection - fever or chills, cough, unusual tiredness, pain or trouble passing urine, or warm, red or painful skin  Side effects that usually do not require medical attention (Report these to your doctor or health care professional if they continue or are bothersome.):  · dizziness  · headache  · nausea, vomiting  · sore throat  · stomach upset  What may interact with this medicine?  Do not take this medicine with any of the following medications:  · adalimumab  · anakinra  · certolizumab  · etanercept  · golimumab  · infliximab  · live virus vaccines  · rituximab  · tocilizumab  This medicine may also interact with the following medications:  · vaccines  What if I miss a dose?  It is important not to miss your dose. Call your doctor or health care professional if you are unable to keep an appointment.  Where should I keep my medicine?  This drug is given in a hospital or clinic and will not be stored at home.  What should I tell my health care provider before I take this medicine?  They need to know if you have any of these conditions:  · are taking other  medicines to treat rheumatoid arthritis  · COPD  · diabetes  · infection or history of infections  · recently received or scheduled to receive a vaccine  · scheduled to have surgery  · tuberculosis, a positive skin test for tuberculosis or have recently been in close contact with someone who has tuberculosis  · viral hepatitis  · an unusual or allergic reaction to abatacept, other medicines, foods, dyes, or preservatives  · pregnant or trying to get pregnant  · breast-feeding  What should I watch for while using this medicine?  Visit your doctor for regular check ups while you are taking this medicine. Tell your doctor or healthcare professional if your symptoms do not start to get better or if they get worse.  Call your doctor or health care professional if you get a cold or other infection while receiving this medicine. Do not treat yourself. This medicine may decrease your body's ability to fight infection. Try to avoid being around people who are sick.  Date Last Reviewed:   NOTE:This sheet is a summary. It may not cover all possible information. If you have questions about this medicine, talk to your doctor, pharmacist, or health care provider. Copyright© 2016 [employer] Standard

## 2017-03-28 NOTE — PROGRESS NOTES
Infusion # >10  S/S infection noted or voiced? no  Recent labs? 2/28/17     Premeds? no     Orencia 1000 mg administered IV at a 30 minute rate per orders; see MAR and vitals for more details. Tolerated well without adverse events, discharged and ambulatory out of clinic.

## 2017-03-28 NOTE — MR AVS SNAPSHOT
[facility]  [address] [place] [address]  Phone: [phone]  Fax: [phone]                  Thomas Sarkar   3/28/2017 8:30 AM   Infusion    Description:  Male : 1950   Provider:  RHEUMATOLOGY, INFUSION   Department:  [facility]           Diagnoses this Visit        Comments    Rheumatoid arthritis of multiple sites with negative rheumatoid factor    -  Primary            To Do List           Future Appointments        Provider Department Dept Phone    2017 8:30 AM RHEUMATOLOGY, INFUSION [facility] [phone]    2017 8:30 AM RHEUMATOLOGY, INFUSION [facility] [phone]    2017 7:55 AM LAB[facility]ATORY, [facility] [phone]    2017 8:30 AM Sebastián Douglass MD [facility] Rheumatology [phone]    2017 9:00 AM RHEUMATOLOGY, INFUSION [facility] [phone]      Goals (5 Years of Data)     None      [facility]     [facility] Nurse Care Line -  Assistance  Registered nurses in the [facility] provide clinical advisement, health education, appointment booking, and other advisory services.  Call for this free service at [phone].             Medications           Message regarding Medications     Verify the changes and/or additions to your medication regime listed below are the same as discussed with your clinician today.  If any of these changes or additions are incorrect, please notify your healthcare provider.        These medications were administered today        Dose Freq    sodium chloride 0.9% flush 10 mL 10 mL As needed (PRN)    Sig: Inject 10 mLs into the vein as needed for Line Care.    Class: Normal    Route: Intravenous    Non-formulary Exception Code: Specific indication for non-formulary alternative    abatacept (with maltose) (ORENCIA) 1,000 mg in sodium chloride 0.9% 100 mL IVPB 1,000  "mg Once    Sig: Inject 1,000 mg into the vein once.    Class: Normal    Route: Intravenous           Verify that the below list of medications is an accurate representation of the medications you are currently taking.  If none reported, the list may be blank. If incorrect, please contact your healthcare provider. Carry this list with you in case of emergency.           Current Medications     aspirin (ECOTRIN) 81 MG EC tablet Take 81 mg by mouth once daily.    BD LUER-CECILIA SYRINGE 3 mL 26 x 5/8" Syrg USE TO INJECT 0.8 ML (20 MG ) OF METHOTREXATE UNDER THE SKIN EVERY 7 DAYS    CRESTOR 20 mg tablet 1 tablet.    fish oil-omega-3 fatty acids 300-1,000 mg capsule Take 1 g by mouth once daily.    folic acid (FOLVITE) 1 MG tablet TAKE 1 TABLET ONCE DAILY    gabapentin (NEURONTIN) 300 MG capsule Take 1 capsule (300 mg total) by mouth 3 (three) times daily.    hydrocodone-acetaminophen 7.5-325mg (NORCO) 7.5-325 mg per tablet 1 tablet as needed.    methotrexate 25 mg/mL injection INJECT 0.8 ML (20 MG TOTAL) INTO THE SKIN EVERY 7 DAYS    metoprolol succinate (TOPROL-XL) 50 MG 24 hr tablet 1 tablet.    multivitamin with minerals tablet Take 1 tablet by mouth once daily.    mupirocin (BACTROBAN) 2 % ointment     pantoprazole (PROTONIX) 40 MG tablet 40 mg once daily.    RESTASIS 0.05 % ophthalmic emulsion     TAMSULOSIN HCL (FLOMAX ORAL) Take by mouth.    triamcinolone acetonide 0.025% (KENALOG) 0.025 % cream Apply topically 2 (two) times daily.           Clinical Reference Information           Your Vitals Were     BP Pulse Temp Resp Height Weight    131/75 60 97 °F (36.1 °C) 18 5' 7" (1.702 m) 102.8 kg (226 lb 10.1 oz)    BMI                35.5 kg/m2          Allergies as of 3/28/2017     Bactrim [Sulfamethoxazole-trimethoprim]    Iodinated Contrast Media - Iv Dye    Prednisone    Tetanus Vaccines And Toxoid    Corticosteroids (Glucocorticoids)      Immunizations Administered on Date of Encounter - 3/28/2017     None    "   Orders Placed During Today's Visit      Normal Orders This Visit    Medication Pre-Authorization       Administrations This Visit     sodium chloride 0.9% flush 10 mL     Admin Date Action Dose Route Administered By             03/28/2017 Given 10 mL Intravenous Sagar Pan RN                      Instructions      Abatacept Solution for injection  What is this medicine?  ABATACEPT (a ba TA sept) is used to treat moderate to severe active rheumatoid arthritis in adults. This medicine is also used to treat juvenile idiopathic arthritis.  How should I use this medicine?  This medicine is for infusion into a vein. It is given by a health care professional in a hospital or clinic setting.  Talk to your pediatrician regarding the use of this medicine in children. While this drug may be prescribed for children as young as 6 years for selected conditions, precautions do apply.  What side effects may I notice from receiving this medicine?  Side effects that you should report to your doctor or health care professional as soon as possible:  · allergic reactions like skin rash, itching or hives, swelling of the face, lips, or tongue  · breathing problems  · chest pain  · signs of infection - fever or chills, cough, unusual tiredness, pain or trouble passing urine, or warm, red or painful skin  Side effects that usually do not require medical attention (Report these to your doctor or health care professional if they continue or are bothersome.):  · dizziness  · headache  · nausea, vomiting  · sore throat  · stomach upset  What may interact with this medicine?  Do not take this medicine with any of the following medications:  · adalimumab  · anakinra  · certolizumab  · etanercept  · golimumab  · infliximab  · live virus vaccines  · rituximab  · tocilizumab  This medicine may also interact with the following medications:  · vaccines  What if I miss a dose?  It is important not to miss your dose. Call your doctor or health  care professional if you are unable to keep an appointment.  Where should I keep my medicine?  This drug is given in a hospital or clinic and will not be stored at home.  What should I tell my health care provider before I take this medicine?  They need to know if you have any of these conditions:  · are taking other medicines to treat rheumatoid arthritis  · COPD  · diabetes  · infection or history of infections  · recently received or scheduled to receive a vaccine  · scheduled to have surgery  · tuberculosis, a positive skin test for tuberculosis or have recently been in close contact with someone who has tuberculosis  · viral hepatitis  · an unusual or allergic reaction to abatacept, other medicines, foods, dyes, or preservatives  · pregnant or trying to get pregnant  · breast-feeding  What should I watch for while using this medicine?  Visit your doctor for regular check ups while you are taking this medicine. Tell your doctor or healthcare professional if your symptoms do not start to get better or if they get worse.  Call your doctor or health care professional if you get a cold or other infection while receiving this medicine. Do not treat yourself. This medicine may decrease your body's ability to fight infection. Try to avoid being around people who are sick.  Date Last Reviewed:   NOTE:This sheet is a summary. It may not cover all possible information. If you have questions about this medicine, talk to your doctor, pharmacist, or health care provider. Copyright© 2016 [employer] Standard             Language Assistance Services     ATTENTION: Language assistance services are available, free of charge. Please call [phone].      ATENCIÓN: Si habla español, tiene a [place] disposición servicios gratuitos de asistencia lingüística. Llame al [phone].     [facility] Ý: N?u b?n nói Ti?ng Vi?t, có các d?ch v? h? tr? ngôn ng? mi?n phí dành cho b?n. G?i s? [phone].         [facility] complies  with applicable Federal civil rights laws and does not discriminate on the basis of race, color, national origin, age, disability, or sex.

## 2017-04-24 ENCOUNTER — TELEPHONE (OUTPATIENT)
Dept: RHEUMATOLOGY | Facility: CLINIC | Age: 67
End: 2017-04-24

## 2017-04-25 ENCOUNTER — INFUSION (OUTPATIENT)
Dept: RHEUMATOLOGY | Facility: HOSPITAL | Age: 67
End: 2017-04-25
Attending: PHYSICIAN ASSISTANT
Payer: MEDICARE

## 2017-04-25 VITALS
SYSTOLIC BLOOD PRESSURE: 130 MMHG | BODY MASS INDEX: 35.36 KG/M2 | WEIGHT: 225.75 LBS | RESPIRATION RATE: 18 BRPM | DIASTOLIC BLOOD PRESSURE: 74 MMHG | HEART RATE: 63 BPM | TEMPERATURE: 98 F

## 2017-04-25 DIAGNOSIS — M06.09 RHEUMATOID ARTHRITIS OF MULTIPLE SITES WITH NEGATIVE RHEUMATOID FACTOR: Primary | ICD-10-CM

## 2017-04-25 PROCEDURE — 25000003 PHARM REV CODE 250: Mod: PO | Performed by: PHYSICIAN ASSISTANT

## 2017-04-25 PROCEDURE — 96413 CHEMO IV INFUSION 1 HR: CPT | Mod: PO

## 2017-04-25 PROCEDURE — 63600175 PHARM REV CODE 636 W HCPCS: Mod: PO | Performed by: PHYSICIAN ASSISTANT

## 2017-04-25 RX ORDER — SODIUM CHLORIDE 0.9 % (FLUSH) 0.9 %
10 SYRINGE (ML) INJECTION
Status: DISCONTINUED | OUTPATIENT
Start: 2017-04-25 | End: 2017-04-25 | Stop reason: HOSPADM

## 2017-04-25 RX ORDER — SODIUM CHLORIDE 0.9 % (FLUSH) 0.9 %
10 SYRINGE (ML) INJECTION
Status: CANCELLED | OUTPATIENT
Start: 2017-04-25

## 2017-04-25 RX ADMIN — Medication 10 ML: at 08:04

## 2017-04-25 RX ADMIN — SODIUM CHLORIDE 1000 MG: 9 INJECTION, SOLUTION INTRAVENOUS at 08:04

## 2017-04-25 NOTE — MR AVS SNAPSHOT
[facility]  [address] [place] [address]  Phone: [phone]  Fax: [phone]                  Thomas Sarkar   2017 8:30 AM   Infusion    Description:  Male : 1950   Provider:  RHEUMATOLOGY, INFUSION   Department:  [facility]           Diagnoses this Visit        Comments    Rheumatoid arthritis of multiple sites with negative rheumatoid factor    -  Primary            To Do List           Future Appointments        Provider Department Dept Phone    2017 8:30 AM RHEUMATOLOGY, INFUSION [facility] [phone]    2017 7:55 AM LAB[facility]ATORY, [facility] [phone]    2017 8:30 AM Sebastián Douglass MD [facility] - Rheumatology [phone]    2017 9:00 AM RHEUMATOLOGY, INFUSION [facility] [phone]    2017 8:30 AM RHEUMATOLOGY, INFUSION [facility] [phone]      Goals (5 Years of Data)     None      [facility]     [facility] Care [facility] -  Assistance  Unless otherwise directed by your provider, please contact [facility], our nurse care line that is available for  assistance.     Registered nurses in the [facility] provide: appointment scheduling, clinical advisement, health education, and other advisory services.  Call: [phone] (toll free)               Medications           Message regarding Medications     Verify the changes and/or additions to your medication regime listed below are the same as discussed with your clinician today.  If any of these changes or additions are incorrect, please notify your healthcare provider.        These medications were administered today        Dose Freq    sodium chloride 0.9% flush 10 mL 10 mL As needed (PRN)    Sig: Inject 10 mLs into the vein as needed for Line Care.    Class: Normal    Route: Intravenous    Non-formulary Exception Code: Specific  "indication for non-formulary alternative    abatacept (with maltose) (ORENCIA) 1,000 mg in sodium chloride 0.9% 100 mL IVPB 1,000 mg Once    Sig: Inject 1,000 mg into the vein once.    Class: Normal    Route: Intravenous           Verify that the below list of medications is an accurate representation of the medications you are currently taking.  If none reported, the list may be blank. If incorrect, please contact your healthcare provider. Carry this list with you in case of emergency.           Current Medications     aspirin (ECOTRIN) 81 MG EC tablet Take 81 mg by mouth once daily.    BD LUER-CECILIA SYRINGE 3 mL 26 x 5/8" Syrg USE TO INJECT 0.8 ML (20 MG ) OF METHOTREXATE UNDER THE SKIN EVERY 7 DAYS    CRESTOR 20 mg tablet 1 tablet.    fish oil-omega-3 fatty acids 300-1,000 mg capsule Take 1 g by mouth once daily.    folic acid (FOLVITE) 1 MG tablet TAKE 1 TABLET ONCE DAILY    gabapentin (NEURONTIN) 300 MG capsule Take 1 capsule (300 mg total) by mouth 3 (three) times daily.    hydrocodone-acetaminophen 7.5-325mg (NORCO) 7.5-325 mg per tablet 1 tablet as needed.    methotrexate 25 mg/mL injection INJECT 0.8 ML (20 MG TOTAL) INTO THE SKIN EVERY 7 DAYS    metoprolol succinate (TOPROL-XL) 50 MG 24 hr tablet 1 tablet.    multivitamin with minerals tablet Take 1 tablet by mouth once daily.    mupirocin (BACTROBAN) 2 % ointment     pantoprazole (PROTONIX) 40 MG tablet 40 mg once daily.    RESTASIS 0.05 % ophthalmic emulsion     TAMSULOSIN HCL (FLOMAX ORAL) Take by mouth.    triamcinolone acetonide 0.025% (KENALOG) 0.025 % cream Apply topically 2 (two) times daily.           Clinical Reference Information           Your Vitals Were     BP Pulse Temp Resp Weight BMI    136/78 67 97.5 °F (36.4 °C) 18 102.4 kg (225 lb 12 oz) 35.36 kg/m2      Allergies as of 4/25/2017     Bactrim [Sulfamethoxazole-trimethoprim]    Iodinated Contrast Media - Oral And Iv Dye    Prednisone    Tetanus Vaccines And Toxoid    Corticosteroids " (Glucocorticoids)      Immunizations Administered on Date of Encounter - 4/25/2017     None      Orders Placed During Today's Visit      Normal Orders This Visit    Medication Pre-Authorization       Administrations This Visit     abatacept (with maltose) (ORENCIA) 1,000 mg in sodium chloride 0.9% 100 mL IVPB     Admin Date Action Dose Rate Route Administered By          04/25/2017 New Bag 1000 mg 200 mL/hr Intravenous Gisella Chapman RN                   sodium chloride 0.9% flush 10 mL     Admin Date Action Dose Route Administered By             04/25/2017 Given 10 mL Intravenous Gisella Chapman RN                      Instructions      Abatacept Solution for injection  What is this medicine?  ABATACEPT (a ba TA sept) is used to treat moderate to severe active rheumatoid arthritis in adults. This medicine is also used to treat juvenile idiopathic arthritis.  How should I use this medicine?  This medicine is for infusion into a vein. It is given by a health care professional in a hospital or clinic setting.  Talk to your pediatrician regarding the use of this medicine in children. While this drug may be prescribed for children as young as 6 years for selected conditions, precautions do apply.  What side effects may I notice from receiving this medicine?  Side effects that you should report to your doctor or health care professional as soon as possible:  · allergic reactions like skin rash, itching or hives, swelling of the face, lips, or tongue  · breathing problems  · chest pain  · signs of infection - fever or chills, cough, unusual tiredness, pain or trouble passing urine, or warm, red or painful skin  Side effects that usually do not require medical attention (Report these to your doctor or health care professional if they continue or are bothersome.):  · dizziness  · headache  · nausea, vomiting  · sore throat  · stomach upset  What may interact with this medicine?  Do not take this medicine with any of the  following medications:  · adalimumab  · anakinra  · certolizumab  · etanercept  · golimumab  · infliximab  · live virus vaccines  · rituximab  · tocilizumab  This medicine may also interact with the following medications:  · vaccines  What if I miss a dose?  It is important not to miss your dose. Call your doctor or health care professional if you are unable to keep an appointment.  Where should I keep my medicine?  This drug is given in a hospital or clinic and will not be stored at home.  What should I tell my health care provider before I take this medicine?  They need to know if you have any of these conditions:  · are taking other medicines to treat rheumatoid arthritis  · COPD  · diabetes  · infection or history of infections  · recently received or scheduled to receive a vaccine  · scheduled to have surgery  · tuberculosis, a positive skin test for tuberculosis or have recently been in close contact with someone who has tuberculosis  · viral hepatitis  · an unusual or allergic reaction to abatacept, other medicines, foods, dyes, or preservatives  · pregnant or trying to get pregnant  · breast-feeding  What should I watch for while using this medicine?  Visit your doctor for regular check ups while you are taking this medicine. Tell your doctor or healthcare professional if your symptoms do not start to get better or if they get worse.  Call your doctor or health care professional if you get a cold or other infection while receiving this medicine. Do not treat yourself. This medicine may decrease your body's ability to fight infection. Try to avoid being around people who are sick.  Date Last Reviewed:   NOTE:This sheet is a summary. It may not cover all possible information. If you have questions about this medicine, talk to your doctor, pharmacist, or health care provider. Copyright© 2016 [employer] Standard    WAYS TO HELP PREVENT INFECTION         WASH YOUR HANDS OFTEN DURING THE DAY, ESPECIALLY BEFORE YOU EAT,  AFTER USING THE BATHROOM, AND AFTER TOUCHING ANIMALS     STAY AWAY FROM PEOPLE WHO HAVE ILLNESSES YOU CAN CATCH; SUCH AS COLDS, FLU, CHICKEN POX     TRY TO AVOID CROWDS     STAY AWAY FROM CHILDREN WHO RECENTLY HAVE RECEIVED LIVE VIRUS VACCINES     MAINTAIN GOOD MOUTH CARE     DO NOT SQUEEZE OR SCRATCH PIMPLES     CLEAN CUTS & SCRAPES RIGHT AWAY AND DAILY UNTIL HEALED WITH WARM WATER, SOAP & AN ANTISEPTIC     AVOID CONTACT WITH LITTER BOXES, BIRD CAGES, & FISH TANKS     AVOID STANDING WATER, IE., BIRD BATHS, FLOWER POTS/VASES, OR HUMIDIFIERS     WEAR GLOVES WHEN GARDENING OR CLEANING UP AFTER OTHERS, ESPECIALLY BABIES & SMALL CHILDREN     DO NOT EAT RAW FISH, SEAFOOD, MEAT, OR EGGS             Language Assistance Services     ATTENTION: Language assistance services are available, free of charge. Please call [phone].      ATENCIÓN: Si joe fontenot, tiene a [place] disposición servicios gratuitos de asistencia lingüística. Llame al [phone].     [facility] Ý: N?u b?n nói Ti?ng Vi?t, có các d?ch v? h? tr? ngôn ng? mi?n phí dành cho b?n. G?i s? [phone].         [facility] complies with applicable Federal civil rights laws and does not discriminate on the basis of race, color, national origin, age, disability, or sex.

## 2017-04-25 NOTE — PROGRESS NOTES
Infusion# >10  S/S infection noted or voiced? denies  Recent labs? yes    Premeds? no    Orencia 1000 mg administered IV at a 30 minute rate per orders; see MAR and vitals for more  details. Tolerated well without adverse events, discharged and ambulatory out of clinic.

## 2017-04-25 NOTE — PATIENT INSTRUCTIONS
Abatacept Solution for injection  What is this medicine?  ABATACEPT (a ba TA sept) is used to treat moderate to severe active rheumatoid arthritis in adults. This medicine is also used to treat juvenile idiopathic arthritis.  How should I use this medicine?  This medicine is for infusion into a vein. It is given by a health care professional in a hospital or clinic setting.  Talk to your pediatrician regarding the use of this medicine in children. While this drug may be prescribed for children as young as 6 years for selected conditions, precautions do apply.  What side effects may I notice from receiving this medicine?  Side effects that you should report to your doctor or health care professional as soon as possible:  · allergic reactions like skin rash, itching or hives, swelling of the face, lips, or tongue  · breathing problems  · chest pain  · signs of infection - fever or chills, cough, unusual tiredness, pain or trouble passing urine, or warm, red or painful skin  Side effects that usually do not require medical attention (Report these to your doctor or health care professional if they continue or are bothersome.):  · dizziness  · headache  · nausea, vomiting  · sore throat  · stomach upset  What may interact with this medicine?  Do not take this medicine with any of the following medications:  · adalimumab  · anakinra  · certolizumab  · etanercept  · golimumab  · infliximab  · live virus vaccines  · rituximab  · tocilizumab  This medicine may also interact with the following medications:  · vaccines  What if I miss a dose?  It is important not to miss your dose. Call your doctor or health care professional if you are unable to keep an appointment.  Where should I keep my medicine?  This drug is given in a hospital or clinic and will not be stored at home.  What should I tell my health care provider before I take this medicine?  They need to know if you have any of these conditions:  · are taking other  medicines to treat rheumatoid arthritis  · COPD  · diabetes  · infection or history of infections  · recently received or scheduled to receive a vaccine  · scheduled to have surgery  · tuberculosis, a positive skin test for tuberculosis or have recently been in close contact with someone who has tuberculosis  · viral hepatitis  · an unusual or allergic reaction to abatacept, other medicines, foods, dyes, or preservatives  · pregnant or trying to get pregnant  · breast-feeding  What should I watch for while using this medicine?  Visit your doctor for regular check ups while you are taking this medicine. Tell your doctor or healthcare professional if your symptoms do not start to get better or if they get worse.  Call your doctor or health care professional if you get a cold or other infection while receiving this medicine. Do not treat yourself. This medicine may decrease your body's ability to fight infection. Try to avoid being around people who are sick.  Date Last Reviewed:   NOTE:This sheet is a summary. It may not cover all possible information. If you have questions about this medicine, talk to your doctor, pharmacist, or health care provider. Copyright© 2016 [employer]    WAYS TO HELP PREVENT INFECTION         WASH YOUR HANDS OFTEN DURING THE DAY, ESPECIALLY BEFORE YOU EAT, AFTER USING THE BATHROOM, AND AFTER TOUCHING ANIMALS     STAY AWAY FROM PEOPLE WHO HAVE ILLNESSES YOU CAN CATCH; SUCH AS COLDS, FLU, CHICKEN POX     TRY TO AVOID CROWDS     STAY AWAY FROM CHILDREN WHO RECENTLY HAVE RECEIVED LIVE VIRUS VACCINES     MAINTAIN GOOD MOUTH CARE     DO NOT SQUEEZE OR SCRATCH PIMPLES     CLEAN CUTS & SCRAPES RIGHT AWAY AND DAILY UNTIL HEALED WITH WARM WATER, SOAP & AN ANTISEPTIC     AVOID CONTACT WITH LITTER BOXES, BIRD CAGES, & FISH TANKS     AVOID STANDING WATER, IE., BIRD BATHS, FLOWER POTS/VASES, OR HUMIDIFIERS     WEAR GLOVES WHEN GARDENING OR CLEANING UP AFTER OTHERS, ESPECIALLY BABIES & SMALL  CHILDREN     DO NOT EAT RAW FISH, SEAFOOD, MEAT, OR EGGS

## 2017-05-18 DIAGNOSIS — M06.09 RHEUMATOID ARTHRITIS OF MULTIPLE SITES WITH NEGATIVE RHEUMATOID FACTOR: Chronic | ICD-10-CM

## 2017-05-18 DIAGNOSIS — M47.816 SPONDYLOSIS OF LUMBAR REGION WITHOUT MYELOPATHY OR RADICULOPATHY: Chronic | ICD-10-CM

## 2017-05-18 DIAGNOSIS — M17.0 PRIMARY OSTEOARTHRITIS OF BOTH KNEES: ICD-10-CM

## 2017-05-19 ENCOUNTER — TELEPHONE (OUTPATIENT)
Dept: RHEUMATOLOGY | Facility: HOSPITAL | Age: 67
End: 2017-05-19

## 2017-05-19 RX ORDER — GABAPENTIN 300 MG/1
CAPSULE ORAL
Qty: 270 CAPSULE | Refills: 3 | Status: SHIPPED | OUTPATIENT
Start: 2017-05-19 | End: 2018-02-16 | Stop reason: SDUPTHER

## 2017-05-22 RX ORDER — SODIUM CHLORIDE 0.9 % (FLUSH) 0.9 %
10 SYRINGE (ML) INJECTION
Status: CANCELLED | OUTPATIENT
Start: 2017-05-22

## 2017-05-23 ENCOUNTER — INFUSION (OUTPATIENT)
Dept: RHEUMATOLOGY | Facility: HOSPITAL | Age: 67
End: 2017-05-23
Attending: INTERNAL MEDICINE
Payer: MEDICARE

## 2017-05-23 VITALS
TEMPERATURE: 97 F | RESPIRATION RATE: 18 BRPM | WEIGHT: 226.63 LBS | BODY MASS INDEX: 35.5 KG/M2 | SYSTOLIC BLOOD PRESSURE: 125 MMHG | HEART RATE: 56 BPM | DIASTOLIC BLOOD PRESSURE: 73 MMHG

## 2017-05-23 DIAGNOSIS — M06.09 RHEUMATOID ARTHRITIS OF MULTIPLE SITES WITH NEGATIVE RHEUMATOID FACTOR: Primary | ICD-10-CM

## 2017-05-23 PROCEDURE — 96413 CHEMO IV INFUSION 1 HR: CPT | Mod: PO

## 2017-05-23 PROCEDURE — 63600175 PHARM REV CODE 636 W HCPCS: Mod: PO | Performed by: INTERNAL MEDICINE

## 2017-05-23 PROCEDURE — 25000003 PHARM REV CODE 250: Mod: PO | Performed by: INTERNAL MEDICINE

## 2017-05-23 RX ORDER — SODIUM CHLORIDE 0.9 % (FLUSH) 0.9 %
10 SYRINGE (ML) INJECTION
Status: CANCELLED | OUTPATIENT
Start: 2017-05-23

## 2017-05-23 RX ORDER — SODIUM CHLORIDE 0.9 % (FLUSH) 0.9 %
10 SYRINGE (ML) INJECTION
Status: DISCONTINUED | OUTPATIENT
Start: 2017-05-23 | End: 2017-05-23 | Stop reason: HOSPADM

## 2017-05-23 RX ADMIN — SODIUM CHLORIDE 1000 MG: 9 INJECTION, SOLUTION INTRAVENOUS at 08:05

## 2017-05-23 RX ADMIN — Medication 10 ML: at 08:05

## 2017-05-23 NOTE — PLAN OF CARE
Problem: Infection, Risk/Actual (Adult, Obstetrics)  Goal: Infection Prevention/Resolution/Control (Infection, Risk/Actual)  Patient will demonstrate the desired outcomes.   Encouraged frequent handwashing and use of antibacterial hand sanitizers.

## 2017-06-20 ENCOUNTER — TELEPHONE (OUTPATIENT)
Dept: RHEUMATOLOGY | Facility: CLINIC | Age: 67
End: 2017-06-20

## 2017-06-20 ENCOUNTER — TELEPHONE (OUTPATIENT)
Dept: RHEUMATOLOGY | Facility: HOSPITAL | Age: 67
End: 2017-06-20

## 2017-06-20 ENCOUNTER — OFFICE VISIT (OUTPATIENT)
Dept: RHEUMATOLOGY | Facility: CLINIC | Age: 67
End: 2017-06-20
Payer: MEDICARE

## 2017-06-20 ENCOUNTER — INFUSION (OUTPATIENT)
Dept: RHEUMATOLOGY | Facility: HOSPITAL | Age: 67
End: 2017-06-20
Attending: INTERNAL MEDICINE
Payer: MEDICARE

## 2017-06-20 VITALS
BODY MASS INDEX: 31.84 KG/M2 | RESPIRATION RATE: 18 BRPM | DIASTOLIC BLOOD PRESSURE: 69 MMHG | SYSTOLIC BLOOD PRESSURE: 123 MMHG | HEART RATE: 55 BPM | WEIGHT: 203.25 LBS

## 2017-06-20 VITALS
BODY MASS INDEX: 35.54 KG/M2 | DIASTOLIC BLOOD PRESSURE: 68 MMHG | SYSTOLIC BLOOD PRESSURE: 127 MMHG | WEIGHT: 226.44 LBS | HEART RATE: 65 BPM | HEIGHT: 67 IN

## 2017-06-20 DIAGNOSIS — M06.09 RHEUMATOID ARTHRITIS OF MULTIPLE SITES WITH NEGATIVE RHEUMATOID FACTOR: Primary | ICD-10-CM

## 2017-06-20 DIAGNOSIS — K52.9 CHRONIC COLITIS: Chronic | ICD-10-CM

## 2017-06-20 DIAGNOSIS — L40.9 PSORIASIS: Chronic | ICD-10-CM

## 2017-06-20 DIAGNOSIS — M06.09 RHEUMATOID ARTHRITIS OF MULTIPLE SITES WITH NEGATIVE RHEUMATOID FACTOR: Primary | Chronic | ICD-10-CM

## 2017-06-20 DIAGNOSIS — Z51.81 MEDICATION MONITORING ENCOUNTER: Primary | Chronic | ICD-10-CM

## 2017-06-20 DIAGNOSIS — Z51.81 MEDICATION MONITORING ENCOUNTER: Chronic | ICD-10-CM

## 2017-06-20 DIAGNOSIS — E66.9 OBESITY, CLASS II, BMI 35-39.9, NO COMORBIDITY: ICD-10-CM

## 2017-06-20 DIAGNOSIS — D84.9 IMMUNOCOMPROMISED PATIENT: Chronic | ICD-10-CM

## 2017-06-20 DIAGNOSIS — M06.09 RHEUMATOID ARTHRITIS OF MULTIPLE SITES WITH NEGATIVE RHEUMATOID FACTOR: Chronic | ICD-10-CM

## 2017-06-20 PROCEDURE — 1159F MED LIST DOCD IN RCRD: CPT | Mod: ,,, | Performed by: INTERNAL MEDICINE

## 2017-06-20 PROCEDURE — 1125F AMNT PAIN NOTED PAIN PRSNT: CPT | Mod: ,,, | Performed by: INTERNAL MEDICINE

## 2017-06-20 PROCEDURE — 99999 PR PBB SHADOW E&M-EST. PATIENT-LVL III: CPT | Mod: PBBFAC,,, | Performed by: INTERNAL MEDICINE

## 2017-06-20 PROCEDURE — 99213 OFFICE O/P EST LOW 20 MIN: CPT | Mod: PBBFAC,25,PO | Performed by: INTERNAL MEDICINE

## 2017-06-20 PROCEDURE — 99214 OFFICE O/P EST MOD 30 MIN: CPT | Mod: S$PBB,,, | Performed by: INTERNAL MEDICINE

## 2017-06-20 RX ORDER — SODIUM CHLORIDE 0.9 % (FLUSH) 0.9 %
10 SYRINGE (ML) INJECTION
Status: CANCELLED | OUTPATIENT
Start: 2017-06-20

## 2017-06-20 RX ORDER — SODIUM CHLORIDE 0.9 % (FLUSH) 0.9 %
10 SYRINGE (ML) INJECTION
Status: DISCONTINUED | OUTPATIENT
Start: 2017-06-20 | End: 2017-06-20 | Stop reason: HOSPADM

## 2017-06-20 RX ADMIN — SODIUM CHLORIDE 1000 MG: 9 INJECTION, SOLUTION INTRAVENOUS at 09:06

## 2017-06-20 RX ADMIN — Medication 10 ML: at 09:06

## 2017-06-20 ASSESSMENT — ROUTINE ASSESSMENT OF PATIENT INDEX DATA (RAPID3): MDHAQ FUNCTION SCORE: 1.3

## 2017-06-20 NOTE — TELEPHONE ENCOUNTER
----- Message from Marilou Smith sent at 6/20/2017 11:30 AM CDT -----  Good Morning,    A treatment plan is needed for Orencia so we may obtain the correct benefits and possible authorization.    Thank you,  Marilou Smith  Pre Service  [id number]

## 2017-06-20 NOTE — PROGRESS NOTES
Infusion # >10  S/S infection noted or voiced? no  Recent labs? 6/20/17     Premeds? no     Orencia 1000 mg administered IV at a 30 minute rate per orders; see MAR and vitals for more details. Tolerated well without adverse events, discharged and ambulatory out of clinic.

## 2017-06-20 NOTE — PLAN OF CARE
Problem: Infection, Risk/Actual (Adult, Obstetrics)  Goal: Infection Prevention/Resolution/Control (Infection, Risk/Actual)  Patient will demonstrate the desired outcomes.   Outcome: Ongoing (interventions implemented as appropriate)  Encouraged frequent handwashing and use of antibacterial hand sanitizers. Also discussed notifying Infusion Dept for any antibiotic use, illness, or invasive procedures near infusion date. Verbalizes understanding.

## 2017-06-21 NOTE — TELEPHONE ENCOUNTER
The Pre- Service department states that a treatment plan is needed for the infusion Mr. Sarkar received on yesterday.

## 2017-06-21 NOTE — PROGRESS NOTES
FOLLOWUP    CHIEF COMPLAINT:  Generalized pain, 5/10.    PERTINENT HISTORY:  Mr. Sarkar is followed with a seronegative rheumatoid   arthritis and psoriasis.  He has associated nonspecific colitis as well.  He   unfortunately did not respond to multiple agents or to methotrexate alone.  He   has failed sulfasalazine.  On December 2015, he was placed on Orencia and   presently has been on that for the last year and a half at 1000 mg every four   weeks.  This seemed to work well.  His GI tract has been doing very well.  His   rashes are unstable.  Unfortunately, he is having a lot of pains and some   recurring in his hands and wrists, but also elbows, shoulders and his back.  He   cannot raise his arms in the morning.  He feels that there is pain in his legs   when he tries to walk.  He just is not doing as well as she would like.    Interestingly, it sounded after he is on the Orencia is good for a couple of   weeks and then things wear off.    REVIEW OF SYSTEMS:  GENERAL:  He is overweight.  No fevers, chills, sweats.  Vaccinations are up to   date.  No fatigability, change in appetite, weight loss.  No recent infections.  SKIN:  Minimal rash now.  No itching or changes in color or texture of skin, no   Raynaud's, no malar rash.  HEAD:  No headache or recent head trauma.  EYES:  No HEENT abnormalities.  Visual acuity fine.  No ocular pain or redness.  EARS:  Denies ear pain, discharge or vertigo.  NOSE:  No loss of smell.  No epistaxis or postnasal drip.  MOUTH AND THROAT:  No hoarseness or change in voice or oral ulcers.  No   difficulty swallowing or dryness.  NODES:  Denies swollen glands.  CHEST:  Denies shortness of breath, EDWARDS, cyanosis, wheezing, cough and sputum   production.  CARDIOVASCULAR:  Past history of MI and CHF.  ABDOMEN:  GI, see above.  He does have reflux as well.  No weight loss.  Denies   nausea, vomiting, diarrhea, abdominal pain, hematemesis or blood in stool.  URINARY:  Prostate  abnormalities noted on Flomax.  PERIPHERAL VASCULAR:  No claudication or cyanosis.  NEUROLOGIC:  He has had some neuropathy symptoms, is on gabapentin.  METABOLIC:  He is overweight.  He has also had hyperlipidemia, on Crestor.    PHYSICAL EXAMINATION:  VITAL SIGNS:  Height 5 feet 7 inches, weight 92 kg, BMI of 31.8, blood pressure   127/68, pulse 60s, pain score 5.  APPEARANCE:  Well nourished, well developed, in no acute distress.  SKIN:  Doing well.  Shows no significant rashes today.  No wounds, no   ecchymosis.  Nail beds pink.  No digital ulcers.  No nodules or tightness noted.  HEENT:  Clear with no major rash on the scalp.  Normocephalic, atraumatic, alert   and oriented X3.  PERRLA.  Conjunctivae clear, sclerae nonicteric.  No   tonsillar enlargement.  No pharyngeal erythema or exudate.  No ulcers or lesions   noted.  Mucous membranes moist and pink.  Oral pharynx clear.  Neck supple, no   JVD.  Normal ROM.  Thyroid normal.  No masses or tracheal deviation.  No   cervical, axillary or inguinal lymph node enlargement.  CHEST:  Lungs clear to auscultation bilaterally.  No dullness to percussion.  No   accessory muscle use.  No intercostal retraction noted.  CARDIOVASCULAR:  Normal S1, S2.  No rubs, murmurs or gallops.  No peripheral   edema.  ABDOMEN:  Bowel sounds normal, abdomen soft, not distended.  No tenderness or   masses.  No hepatosplenomegaly.  EXTREMITIES:  No clubbing, cyanosis or edema noted.  Dorsalis and pedis pulses   2+ palpable.  NEUROLOGICAL:  Cranial nerves II-XII intact.  Motor 5/5 strength major   flexors/extensors.  No tremor.  GAIT AND POSTURE:  Normal gait.  No cerebellar signs.  MUSCULOSKELETAL:  Joints, see HPI.  Muscle strength normal.    LABORATORY DATA:  Reviewed.  White count 6700 with crit 40%, normal platelets,   sed rate is at 10.  Electrolytes good.  Creatinine 1.2, creatinine clearance   normal.  LFTs normal.  Chronic elevated bilirubin noted.  Alk phos is normal.    Glucose  elevated 205.  CRP 8, slightly elevated.    X-rays done in January did not show any erosions in hands and feet.    IMPRESSION:  Seronegative RA with psoriasis.  Ongoing generalized pain, CDAI   greater than 10.  Seems to have a partial response to Orencia and has not   Used all TNFs.  Cannot use certain medicines because of his history of   diverticulitis.  Liver disease noted as well as neuropathy.    PLAN:  1.  We will give him his Orencia today and use as a loading dose and move him to   subQ Orencia starting in approximately 10 days.  Note, he is leaving for   vacation in a couple of days.  We will use 125 mg subQ a week if this is better   since he is essentially responding with just a week or two after Orencia.  2.  Methotrexate 20 mg presently, is on vitamins.  If this is effective, then we   have to consider Rituxan versus another TNF.  Note he had not been on Simponi.  3.  See back in several months with routine lab.      WILLIAN/AMY  dd: 06/20/2017 22:36:20 (CDT)  td: 06/21/2017 20:09:56 (CDT)  Doc ID   #[id number]  Job ID #[id number]    CC: Suzette Alvarez M.D.

## 2017-07-03 ENCOUNTER — TELEPHONE (OUTPATIENT)
Dept: PHARMACY | Facility: CLINIC | Age: 67
End: 2017-07-03

## 2017-07-03 ENCOUNTER — TELEPHONE (OUTPATIENT)
Dept: RHEUMATOLOGY | Facility: CLINIC | Age: 67
End: 2017-07-03

## 2017-07-03 NOTE — TELEPHONE ENCOUNTER
----- Message from Angela Mccabe sent at 7/3/2017  8:14 AM CDT -----  Contact: self-[phone]  Patient would like to consult with nurse regarding appointment scheduled on 7/5/16.patient has not yet received medication to bring to appointment. Please call back at [phone].      Thanks,  Angela Mccabe

## 2017-07-03 NOTE — TELEPHONE ENCOUNTER
Spoke with Mr. Sarkar and encouraged him to keep his appointment with with Rheumatology Nurse visit on 07/05/2017 for his Orencia teaching. Ovidio MARTIN, Pharm, D will be contacting patient regarding his insurance information to process his Orencia SubQ injection.

## 2017-07-05 ENCOUNTER — CLINICAL SUPPORT (OUTPATIENT)
Dept: RHEUMATOLOGY | Facility: CLINIC | Age: 67
End: 2017-07-05
Payer: MEDICARE

## 2017-07-05 NOTE — PROGRESS NOTES
Patient teaching of Orencia 125mg/ml syringe. Patient instructed on how to inspect, use and dispose of syringe. Orencia contact number provided along with [facility] number for any questions. Patient states understanding with read back and demonstration. Patient has a history of Sub Q injections with Methotrexate. Visit lasted 10 mins. Pamphlets were also given for home use.

## 2017-07-17 NOTE — TELEPHONE ENCOUNTER
Spoke with Mr. Sarkar and informed him that Yumiko, PharmCHUCKY and Yanet Byrnes, are currently working on his Orencia SubQ PA and will be notified of decision by the Pharmacy.     Dr. Douglass,     Mr. Sarkar states he is now beginning to experience some joint pain since last Orencia administration. He is waiting for the authorization of his PA at the moment for his Orencia SubQ. Is there anything he can do to help until then? He states he cannot take steroids due to his glaucoma. Please advise.

## 2017-07-17 NOTE — TELEPHONE ENCOUNTER
----- Message from Yamini Kingston sent at 7/17/2017  8:01 AM CDT -----  Contact: pt  Please call pt ASAP this morning at [phone] re the setting up of his weekly injections which has still not happened yet and therefore should he be scheduled for the previous treatment of monthly infusion which would have been due tomorrow?

## 2017-07-24 ENCOUNTER — PATIENT MESSAGE (OUTPATIENT)
Dept: RHEUMATOLOGY | Facility: CLINIC | Age: 67
End: 2017-07-24

## 2017-07-24 ENCOUNTER — TELEPHONE (OUTPATIENT)
Dept: RHEUMATOLOGY | Facility: CLINIC | Age: 67
End: 2017-07-24

## 2017-07-24 NOTE — TELEPHONE ENCOUNTER
Got a message on the portal from the patient saying that he get notification from his insurance company that Orencia subcutaneous was denied      Do you know what's with going on with this?    His medication denied or do we need a prior arthritic station or an appeal of a denial    We may also be up to get him premedication through [facility] with Orencia  Maybe Yanet can look into that    He'll come by Wednesday so may be you  can touch base with him      Thanks  Juanita

## 2017-08-21 ENCOUNTER — LAB VISIT (OUTPATIENT)
Dept: LAB | Facility: HOSPITAL | Age: 67
End: 2017-08-21
Attending: PHYSICIAN ASSISTANT
Payer: MEDICARE

## 2017-08-21 ENCOUNTER — OFFICE VISIT (OUTPATIENT)
Dept: RHEUMATOLOGY | Facility: CLINIC | Age: 67
End: 2017-08-21
Payer: MEDICARE

## 2017-08-21 VITALS
WEIGHT: 228.38 LBS | DIASTOLIC BLOOD PRESSURE: 70 MMHG | HEIGHT: 67 IN | BODY MASS INDEX: 35.84 KG/M2 | HEART RATE: 51 BPM | SYSTOLIC BLOOD PRESSURE: 125 MMHG

## 2017-08-21 DIAGNOSIS — M06.09 RHEUMATOID ARTHRITIS OF MULTIPLE SITES WITH NEGATIVE RHEUMATOID FACTOR: Chronic | ICD-10-CM

## 2017-08-21 DIAGNOSIS — M06.09 RHEUMATOID ARTHRITIS OF MULTIPLE SITES WITH NEGATIVE RHEUMATOID FACTOR: Primary | Chronic | ICD-10-CM

## 2017-08-21 DIAGNOSIS — Z51.81 MEDICATION MONITORING ENCOUNTER: Chronic | ICD-10-CM

## 2017-08-21 DIAGNOSIS — L40.9 PSORIASIS: Chronic | ICD-10-CM

## 2017-08-21 DIAGNOSIS — D84.9 IMMUNOCOMPROMISED PATIENT: Chronic | ICD-10-CM

## 2017-08-21 LAB
ALBUMIN SERPL BCP-MCNC: 3.6 G/DL
ALP SERPL-CCNC: 86 U/L
ALT SERPL W/O P-5'-P-CCNC: 46 U/L
ANION GAP SERPL CALC-SCNC: 11 MMOL/L
AST SERPL-CCNC: 31 U/L
BASOPHILS # BLD AUTO: 0.03 K/UL
BASOPHILS NFR BLD: 0.6 %
BILIRUB SERPL-MCNC: 1.3 MG/DL
BUN SERPL-MCNC: 17 MG/DL
CALCIUM SERPL-MCNC: 9 MG/DL
CHLORIDE SERPL-SCNC: 107 MMOL/L
CO2 SERPL-SCNC: 22 MMOL/L
CREAT SERPL-MCNC: 0.9 MG/DL
CRP SERPL-MCNC: 1.8 MG/L
DIFFERENTIAL METHOD: ABNORMAL
EOSINOPHIL # BLD AUTO: 0.2 K/UL
EOSINOPHIL NFR BLD: 3.6 %
ERYTHROCYTE [DISTWIDTH] IN BLOOD BY AUTOMATED COUNT: 13.6 %
ERYTHROCYTE [SEDIMENTATION RATE] IN BLOOD BY WESTERGREN METHOD: 14 MM/HR
EST. GFR  (AFRICAN AMERICAN): >60 ML/MIN/1.73 M^2
EST. GFR  (NON AFRICAN AMERICAN): >60 ML/MIN/1.73 M^2
GLUCOSE SERPL-MCNC: 112 MG/DL
HCT VFR BLD AUTO: 40 %
HGB BLD-MCNC: 13.6 G/DL
LYMPHOCYTES # BLD AUTO: 1.2 K/UL
LYMPHOCYTES NFR BLD: 26.3 %
MCH RBC QN AUTO: 31.4 PG
MCHC RBC AUTO-ENTMCNC: 34 G/DL
MCV RBC AUTO: 92 FL
MONOCYTES # BLD AUTO: 0.4 K/UL
MONOCYTES NFR BLD: 9.3 %
NEUTROPHILS # BLD AUTO: 2.8 K/UL
NEUTROPHILS NFR BLD: 60.2 %
PLATELET # BLD AUTO: 179 K/UL
PMV BLD AUTO: 11.4 FL
POTASSIUM SERPL-SCNC: 4.6 MMOL/L
PROT SERPL-MCNC: 7.2 G/DL
RBC # BLD AUTO: 4.33 M/UL
SODIUM SERPL-SCNC: 140 MMOL/L
WBC # BLD AUTO: 4.72 K/UL

## 2017-08-21 PROCEDURE — 99213 OFFICE O/P EST LOW 20 MIN: CPT | Mod: PBBFAC,PO | Performed by: PHYSICIAN ASSISTANT

## 2017-08-21 PROCEDURE — 1159F MED LIST DOCD IN RCRD: CPT | Mod: ,,, | Performed by: PHYSICIAN ASSISTANT

## 2017-08-21 PROCEDURE — 99214 OFFICE O/P EST MOD 30 MIN: CPT | Mod: S$PBB,,, | Performed by: PHYSICIAN ASSISTANT

## 2017-08-21 PROCEDURE — 99999 PR PBB SHADOW E&M-EST. PATIENT-LVL III: CPT | Mod: PBBFAC,,, | Performed by: PHYSICIAN ASSISTANT

## 2017-08-21 PROCEDURE — 1125F AMNT PAIN NOTED PAIN PRSNT: CPT | Mod: ,,, | Performed by: PHYSICIAN ASSISTANT

## 2017-08-21 ASSESSMENT — CLINICAL DISEASE ACTIVITY INDEX (CDAI)
TENDER_JOINTS_COUNT: 6
TOTAL_SCORE: 9
SWOLLEN_JOINTS_COUNT: 0
PATIENT_ASSESSMENT: 2
PHYSICIAN_ASSESSMENT: 1

## 2017-08-21 ASSESSMENT — ROUTINE ASSESSMENT OF PATIENT INDEX DATA (RAPID3): MDHAQ FUNCTION SCORE: 1.5

## 2017-08-21 NOTE — TELEPHONE ENCOUNTER
See below  Where are we with Orencia sub q on this pt  Did we do an appeal    Are we working on assistance with [employer]?

## 2017-08-21 NOTE — PROGRESS NOTES
Subjective:       Patient ID: Thomas Sarkar is a 67 y.o. male.    Chief Complaint: Rheumatoid Arthritis; Psoriasis; Osteoarthritis; and immunocompromised      Thomas is back today for rheumatology followup. We have followed thomas for seronegative RA with psoriasis overlap and chronic unspecified Colitis. He is followed by GI. His gi issues have been very quiet. His joints stanley both hands have been the predominate issue.  Failed mtx monotherapy. Failed sulfasalazine. He has failed multiple tnf agents (remicade, enbrel, humira and cimzia),was on IV Orecnica 1000 mg Q 4 weeks  over 1 year at last visit dr booth moved him to Orencia sub Q 125 mg weekly dose to see if this would help better. Insurance denied we are working on appeal. He is also on  parental mtx 20 mg (0.8 mL) once weekly and folic acid. Cannot take nsaids but was on limbrel daily. Now off due to cost of medication and medicare not willing to cover. Dry eyes now on restasis.  Overall he reports the Orencia has worked the best of all the meds. Today pain is 6/10.  Hands. Scalp psoriasis comes and goes, scalp pretty clear right now. Reports diverticulitis in the past.  previously used oral  hydrocodone 10/325  but not really helping so he stopped.  Now on gabapentin 300 mg tid to help some to reduce pain. No side effects. No recent infections or fevers.             Psoriasis   Associated symptoms include arthralgias and joint swelling. Pertinent negatives include no abdominal pain, chest pain, chills, congestion, coughing, fatigue, fever, nausea, numbness, rash, vomiting or weakness.   Osteoarthritis   Associated symptoms include arthralgias and joint swelling. Pertinent negatives include no abdominal pain, chest pain, chills, congestion, coughing, fatigue, fever, nausea, numbness, rash, vomiting or weakness.     Review of Systems   Constitutional: Negative.  Negative for chills, fatigue and fever.   HENT: Negative.  Negative for congestion, mouth  "sores and trouble swallowing.    Eyes: Negative.  Negative for photophobia, redness and visual disturbance.   Respiratory: Negative.  Negative for cough, shortness of breath and wheezing.    Cardiovascular: Negative.  Negative for chest pain and leg swelling.   Gastrointestinal: Negative.  Negative for abdominal distention, abdominal pain, anal bleeding, blood in stool, constipation, diarrhea, nausea, rectal pain and vomiting.   Genitourinary: Negative.  Negative for dysuria, flank pain, frequency and urgency.   Musculoskeletal: Positive for arthralgias and joint swelling. Negative for gait problem.   Skin: Negative for color change, pallor and rash.   Neurological: Negative.  Negative for dizziness, weakness and numbness.         Objective:     /70   Pulse (!) 51   Ht 5' 7" (1.702 m)   Wt 103.6 kg (228 lb 6.3 oz)   BMI 35.77 kg/m²      Physical Exam   Constitutional: He is oriented to person, place, and time and well-developed, well-nourished, and in no distress. No distress.   HENT:   Head: Normocephalic and atraumatic.   Right Ear: External ear normal.   Left Ear: External ear normal.   Mouth/Throat: No oropharyngeal exudate.   Eyes: Conjunctivae and EOM are normal. Pupils are equal, round, and reactive to light. No scleral icterus.   Neck: Neck supple. No thyromegaly present.   Cardiovascular: Normal rate, regular rhythm and normal heart sounds.    No murmur heard.  Pulmonary/Chest: Effort normal and breath sounds normal. No respiratory distress.   Abdominal: Soft. Bowel sounds are normal.       Right Side Rheumatological Exam     Examination finds the shoulder, elbow, wrist, knee, 1st PIP, 1st MCP, 2nd PIP, 4th MCP, 5th PIP and 5th MCP normal.    The patient is tender to palpation of the 2nd MCP, 3rd PIP, 3rd MCP and 4th PIP    Left Side Rheumatological Exam     Examination finds the shoulder, elbow, wrist, knee, 1st PIP, 1st MCP, 2nd PIP, 3rd MCP, 4th MCP and 5th MCP normal.    The patient is tender " to palpation of the 2nd MCP and 3rd PIP.      Lymphadenopathy:     He has no cervical adenopathy.   Neurological: He is alert and oriented to person, place, and time. No cranial nerve deficit. He exhibits normal muscle tone. Gait normal. Coordination normal.   Skin: Skin is warm and dry. No rash noted.          Musculoskeletal: He exhibits edema and tenderness.   Puffiness to several fingers and pip joints with tenderness noted on exam  Decreased range of motion pip joints                  Recent Results (from the past 1008 hour(s))   CBC auto differential    Collection Time: 08/21/17  9:00 AM   Result Value Ref Range    WBC 4.72 3.90 - 12.70 K/uL    RBC 4.33 (L) 4.60 - 6.20 M/uL    Hemoglobin 13.6 (L) 14.0 - 18.0 g/dL    Hematocrit 40.0 40.0 - 54.0 %    MCV 92 82 - 98 fL    MCH 31.4 (H) 27.0 - 31.0 pg    MCHC 34.0 32.0 - 36.0 g/dL    RDW 13.6 11.5 - 14.5 %    Platelets 179 150 - 350 K/uL    MPV 11.4 9.2 - 12.9 fL    Gran # 2.8 1.8 - 7.7 K/uL    Lymph # 1.2 1.0 - 4.8 K/uL    Mono # 0.4 0.3 - 1.0 K/uL    Eos # 0.2 0.0 - 0.5 K/uL    Baso # 0.03 0.00 - 0.20 K/uL    Gran% 60.2 38.0 - 73.0 %    Lymph% 26.3 18.0 - 48.0 %    Mono% 9.3 4.0 - 15.0 %    Eosinophil% 3.6 0.0 - 8.0 %    Basophil% 0.6 0.0 - 1.9 %    Differential Method Automated    Comprehensive metabolic panel    Collection Time: 08/21/17  9:00 AM   Result Value Ref Range    Sodium 140 136 - 145 mmol/L    Potassium 4.6 3.5 - 5.1 mmol/L    Chloride 107 95 - 110 mmol/L    CO2 22 (L) 23 - 29 mmol/L    Glucose 112 (H) 70 - 110 mg/dL    BUN, Bld 17 8 - 23 mg/dL    Creatinine 0.9 0.5 - 1.4 mg/dL    Calcium 9.0 8.7 - 10.5 mg/dL    Total Protein 7.2 6.0 - 8.4 g/dL    Albumin 3.6 3.5 - 5.2 g/dL    Total Bilirubin 1.3 (H) 0.1 - 1.0 mg/dL    Alkaline Phosphatase 86 55 - 135 U/L    AST 31 10 - 40 U/L    ALT 46 (H) 10 - 44 U/L    Anion Gap 11 8 - 16 mmol/L    eGFR if African American >60 >60 mL/min/1.73 m^2    eGFR if non African American >60 >60 mL/min/1.73 m^2          Assessment:       1. Rheumatoid arthritis of multiple sites with negative rheumatoid factor    2. Psoriasis    3. Medication monitoring encounter    4. Immunocompromised patient            1.  Seronegative rheumatoid arthritis -  Who has failed mtx monotheraoy, failed Enbrel, Remicade, Humira, cimzia- best response so far has been from IV Orencia 1000 mg every 4 weeks but we moved him to sub q to see if we could improved response with weekly dose- insurance denied 6 Tender/ 0 swollen - cdai 9, RENEE 1.5  2.  Skin psoriasis better on mtx and topical- no scalp rash today   3.  Chronic unspecified colitis  - not UC- Stable  4.  Osteoarthritis of both knees  5.  Vaccines up to date   6.  Medication Monitoring- no current issues, no evidence of toxicity  7.  Immunocompromised no issues with recurrent infections  8.  Chronic pain on gabapentin 300 mg tid        Plan:        continue mtx 0.8 mL 20 mg sub Q once weekly injection,  Will appeal denial of sub Q orencia 125 mg weekly injection, medically necessary (cdai improved on orencia from 26-30 down to 9)  Medically necessary for seronegative erosive RA  Who has failed mtx monotherapy  Failed humira, enbrel, Remicade, cimzia  Orencia has the best clinical response of all meds tried   if fails Orencia would avoid actemra or xelzanz with his prior diverticulitis, not sure what next ? Rituxan   Would move back to IV Orencia       Keep his  gabapentin at 300 mg tid see if this helps with pain,   Repeat kaushik hand and foot X-rays in 1.5-2 years    rtc 12 weeks with labs    call with any questions, changes, or concerns

## 2017-08-21 NOTE — Clinical Note
Need to appeal sub Q orencia, denied by [employer] See my note for info on why medically necessary and what he has failed Thanks. Juanita

## 2017-08-28 ENCOUNTER — PATIENT MESSAGE (OUTPATIENT)
Dept: RHEUMATOLOGY | Facility: CLINIC | Age: 67
End: 2017-08-28

## 2017-09-06 ENCOUNTER — DOCUMENTATION ONLY (OUTPATIENT)
Dept: RHEUMATOLOGY | Facility: CLINIC | Age: 67
End: 2017-09-06

## 2017-09-06 NOTE — PROGRESS NOTES
Orencia [employer] approved. From  08/07/2017-09/06/2018. Mr. Colonwell notified and refill request was sent to Dr. Douglass to be sent to [facility].

## 2017-11-14 ENCOUNTER — LAB VISIT (OUTPATIENT)
Dept: LAB | Facility: HOSPITAL | Age: 67
End: 2017-11-14
Attending: INTERNAL MEDICINE
Payer: MEDICARE

## 2017-11-14 ENCOUNTER — OFFICE VISIT (OUTPATIENT)
Dept: RHEUMATOLOGY | Facility: CLINIC | Age: 67
End: 2017-11-14
Payer: MEDICARE

## 2017-11-14 VITALS
DIASTOLIC BLOOD PRESSURE: 84 MMHG | HEART RATE: 54 BPM | HEIGHT: 67 IN | SYSTOLIC BLOOD PRESSURE: 169 MMHG | BODY MASS INDEX: 35.16 KG/M2 | WEIGHT: 224 LBS

## 2017-11-14 DIAGNOSIS — K52.9 CHRONIC COLITIS: Chronic | ICD-10-CM

## 2017-11-14 DIAGNOSIS — M06.09 RHEUMATOID ARTHRITIS OF MULTIPLE SITES WITH NEGATIVE RHEUMATOID FACTOR: Primary | Chronic | ICD-10-CM

## 2017-11-14 DIAGNOSIS — M06.09 RHEUMATOID ARTHRITIS OF MULTIPLE SITES WITH NEGATIVE RHEUMATOID FACTOR: Chronic | ICD-10-CM

## 2017-11-14 DIAGNOSIS — Z51.81 MEDICATION MONITORING ENCOUNTER: Chronic | ICD-10-CM

## 2017-11-14 DIAGNOSIS — M17.0 PRIMARY OSTEOARTHRITIS OF BOTH KNEES: ICD-10-CM

## 2017-11-14 DIAGNOSIS — G89.29 CHRONIC MUSCULOSKELETAL PAIN: ICD-10-CM

## 2017-11-14 DIAGNOSIS — M79.18 CHRONIC MUSCULOSKELETAL PAIN: ICD-10-CM

## 2017-11-14 DIAGNOSIS — M47.816 SPONDYLOSIS OF LUMBAR REGION WITHOUT MYELOPATHY OR RADICULOPATHY: Chronic | ICD-10-CM

## 2017-11-14 DIAGNOSIS — L40.9 PSORIASIS: Chronic | ICD-10-CM

## 2017-11-14 DIAGNOSIS — D84.9 IMMUNOCOMPROMISED PATIENT: Chronic | ICD-10-CM

## 2017-11-14 LAB
ALBUMIN SERPL BCP-MCNC: 3.7 G/DL
ALP SERPL-CCNC: 77 U/L
ALT SERPL W/O P-5'-P-CCNC: 53 U/L
ANION GAP SERPL CALC-SCNC: 8 MMOL/L
AST SERPL-CCNC: 33 U/L
BASOPHILS # BLD AUTO: 0.03 K/UL
BASOPHILS NFR BLD: 0.6 %
BILIRUB SERPL-MCNC: 1.2 MG/DL
BUN SERPL-MCNC: 14 MG/DL
CALCIUM SERPL-MCNC: 9.6 MG/DL
CHLORIDE SERPL-SCNC: 106 MMOL/L
CO2 SERPL-SCNC: 27 MMOL/L
CREAT SERPL-MCNC: 1 MG/DL
CRP SERPL-MCNC: 1.2 MG/L
DIFFERENTIAL METHOD: ABNORMAL
EOSINOPHIL # BLD AUTO: 0.1 K/UL
EOSINOPHIL NFR BLD: 2.2 %
ERYTHROCYTE [DISTWIDTH] IN BLOOD BY AUTOMATED COUNT: 13.7 %
ERYTHROCYTE [SEDIMENTATION RATE] IN BLOOD BY WESTERGREN METHOD: 10 MM/HR
EST. GFR  (AFRICAN AMERICAN): >60 ML/MIN/1.73 M^2
EST. GFR  (NON AFRICAN AMERICAN): >60 ML/MIN/1.73 M^2
GLUCOSE SERPL-MCNC: 109 MG/DL
HCT VFR BLD AUTO: 39 %
HGB BLD-MCNC: 13.2 G/DL
LYMPHOCYTES # BLD AUTO: 1.3 K/UL
LYMPHOCYTES NFR BLD: 23.3 %
MCH RBC QN AUTO: 31.1 PG
MCHC RBC AUTO-ENTMCNC: 33.8 G/DL
MCV RBC AUTO: 92 FL
MONOCYTES # BLD AUTO: 0.6 K/UL
MONOCYTES NFR BLD: 11.7 %
NEUTROPHILS # BLD AUTO: 3.4 K/UL
NEUTROPHILS NFR BLD: 62.2 %
PLATELET # BLD AUTO: 206 K/UL
PMV BLD AUTO: 11.2 FL
POTASSIUM SERPL-SCNC: 4.7 MMOL/L
PROT SERPL-MCNC: 7.3 G/DL
RBC # BLD AUTO: 4.24 M/UL
SODIUM SERPL-SCNC: 141 MMOL/L
WBC # BLD AUTO: 5.45 K/UL

## 2017-11-14 PROCEDURE — 36415 COLL VENOUS BLD VENIPUNCTURE: CPT | Mod: PO

## 2017-11-14 PROCEDURE — 99214 OFFICE O/P EST MOD 30 MIN: CPT | Mod: S$PBB,,, | Performed by: PHYSICIAN ASSISTANT

## 2017-11-14 PROCEDURE — 80053 COMPREHEN METABOLIC PANEL: CPT | Mod: PO

## 2017-11-14 PROCEDURE — 85025 COMPLETE CBC W/AUTO DIFF WBC: CPT | Mod: PO

## 2017-11-14 PROCEDURE — 99999 PR PBB SHADOW E&M-EST. PATIENT-LVL IV: CPT | Mod: PBBFAC,,, | Performed by: PHYSICIAN ASSISTANT

## 2017-11-14 PROCEDURE — 85651 RBC SED RATE NONAUTOMATED: CPT | Mod: PO

## 2017-11-14 PROCEDURE — 86140 C-REACTIVE PROTEIN: CPT

## 2017-11-14 PROCEDURE — 99214 OFFICE O/P EST MOD 30 MIN: CPT | Mod: PBBFAC,PO | Performed by: PHYSICIAN ASSISTANT

## 2017-11-14 RX ORDER — DULOXETIN HYDROCHLORIDE 60 MG/1
60 CAPSULE, DELAYED RELEASE ORAL DAILY
Qty: 90 CAPSULE | Refills: 1 | Status: SHIPPED | OUTPATIENT
Start: 2017-11-14 | End: 2018-02-16 | Stop reason: SDUPTHER

## 2017-11-14 RX ORDER — DULOXETIN HYDROCHLORIDE 30 MG/1
30 CAPSULE, DELAYED RELEASE ORAL DAILY
Qty: 30 CAPSULE | Refills: 0 | Status: SHIPPED | OUTPATIENT
Start: 2017-11-14 | End: 2017-11-14 | Stop reason: SDUPTHER

## 2017-11-14 ASSESSMENT — CLINICAL DISEASE ACTIVITY INDEX (CDAI)
PHYSICIAN_ASSESSMENT: 4
TENDER_JOINTS_COUNT: 8
SWOLLEN_JOINTS_COUNT: 1
PATIENT_ASSESSMENT: 5
TOTAL_SCORE: 18

## 2017-11-14 ASSESSMENT — ROUTINE ASSESSMENT OF PATIENT INDEX DATA (RAPID3): MDHAQ FUNCTION SCORE: 1.9

## 2017-11-14 NOTE — PROGRESS NOTES
Subjective:       Patient ID: Thomas Sarkar is a 67 y.o. male.    Chief Complaint: Rheumatoid Arthritis; Psoriasis; and immunocompromised      Thomas is back today for rheumatology followup. We have followed thomas for seronegative RA with psoriasis overlap and chronic unspecified Colitis. He is followed by GI. His gi issues have been very quiet. His joints stanley both hands have been the predominate issue.  Failed mtx monotherapy. Failed sulfasalazine. He has failed multiple tnf agents (remicade, enbrel, humira and cimzia),was on IV Orecnica 1000 mg Q 4 weeks  over 1 year at last visit dr booth moved him to Orencia sub Q 125 mg weekly dose to see if this would help better. Insurance denied but we appealed and was able to get. He is still on background  parental mtx 20 mg (0.8 mL) once weekly and folic acid. Cannot take nsaids but was on limbrel daily. Cam off this due to cost.   Dry eyes now on restasis.  Overall he reports the Orencia has worked the best of all the meds. He still has a lot of msk pain. Elbows, hips, feet and hands with prolonged stiffness in his joints stanley hands. Today pain is 7/10.  Scalp psoriasis comes and goes, scalp pretty clear right now. Reports diverticulitis in the past.  previously used oral  hydrocodone 10/325  but not really helping so he stopped.  Now on gabapentin 300 mg tid to help some to reduce pain. No side effects. No recent infections or fevers. Never taken cymbalta             Psoriasis   Associated symptoms include arthralgias and joint swelling. Pertinent negatives include no abdominal pain, chest pain, chills, congestion, coughing, fatigue, fever, nausea, numbness, rash, vomiting or weakness.   Osteoarthritis   Associated symptoms include arthralgias and joint swelling. Pertinent negatives include no abdominal pain, chest pain, chills, congestion, coughing, fatigue, fever, nausea, numbness, rash, vomiting or weakness.     Review of Systems   Constitutional: Negative.  " Negative for chills, fatigue and fever.   HENT: Negative.  Negative for congestion, mouth sores and trouble swallowing.    Eyes: Negative.  Negative for photophobia, redness and visual disturbance.   Respiratory: Negative.  Negative for cough, shortness of breath and wheezing.    Cardiovascular: Negative.  Negative for chest pain and leg swelling.   Gastrointestinal: Negative.  Negative for abdominal distention, abdominal pain, anal bleeding, blood in stool, constipation, diarrhea, nausea, rectal pain and vomiting.   Genitourinary: Negative.  Negative for dysuria, flank pain, frequency and urgency.   Musculoskeletal: Positive for arthralgias and joint swelling. Negative for gait problem.   Skin: Negative for color change, pallor and rash.   Neurological: Negative.  Negative for dizziness, weakness and numbness.         Objective:     BP (!) 169/84   Pulse (!) 54   Ht 5' 7" (1.702 m)   Wt 101.6 kg (223 lb 15.8 oz)   BMI 35.08 kg/m²      Physical Exam   Constitutional: He is oriented to person, place, and time and well-developed, well-nourished, and in no distress. No distress.   HENT:   Head: Normocephalic and atraumatic.   Right Ear: External ear normal.   Left Ear: External ear normal.   Mouth/Throat: No oropharyngeal exudate.   Eyes: Conjunctivae and EOM are normal. Pupils are equal, round, and reactive to light. No scleral icterus.   Neck: Neck supple. No thyromegaly present.   Cardiovascular: Normal rate, regular rhythm and normal heart sounds.    No murmur heard.  Pulmonary/Chest: Effort normal and breath sounds normal. No respiratory distress.   Abdominal: Soft. Bowel sounds are normal.       Right Side Rheumatological Exam     Examination finds the shoulder, knee, 1st PIP, 1st MCP, 2nd MCP, 3rd MCP, 4th MCP, 5th PIP and 5th MCP normal.    The patient is tender to palpation of the elbow, wrist, 2nd PIP, 3rd PIP and 4th PIP    He has swelling of the wrist    Left Side Rheumatological Exam     Examination " finds the shoulder, wrist, knee, 1st PIP, 1st MCP, 2nd PIP, 3rd MCP, 4th MCP and 5th MCP normal.    The patient is tender to palpation of the elbow, 2nd MCP and 3rd PIP.      Lymphadenopathy:     He has no cervical adenopathy.   Neurological: He is alert and oriented to person, place, and time. No cranial nerve deficit. He exhibits normal muscle tone. Gait normal. Coordination normal.   Skin: Skin is warm and dry. No rash noted.          Musculoskeletal: He exhibits edema and tenderness.   Puffiness to several fingers and pip joints with tenderness noted on exam  Decreased range of motion pip joints                  Recent Results (from the past 1008 hour(s))   CBC auto differential    Collection Time: 11/14/17  9:22 AM   Result Value Ref Range    WBC 5.45 3.90 - 12.70 K/uL    RBC 4.24 (L) 4.60 - 6.20 M/uL    Hemoglobin 13.2 (L) 14.0 - 18.0 g/dL    Hematocrit 39.0 (L) 40.0 - 54.0 %    MCV 92 82 - 98 fL    MCH 31.1 (H) 27.0 - 31.0 pg    MCHC 33.8 32.0 - 36.0 g/dL    RDW 13.7 11.5 - 14.5 %    Platelets 206 150 - 350 K/uL    MPV 11.2 9.2 - 12.9 fL    Gran # 3.4 1.8 - 7.7 K/uL    Lymph # 1.3 1.0 - 4.8 K/uL    Mono # 0.6 0.3 - 1.0 K/uL    Eos # 0.1 0.0 - 0.5 K/uL    Baso # 0.03 0.00 - 0.20 K/uL    Gran% 62.2 38.0 - 73.0 %    Lymph% 23.3 18.0 - 48.0 %    Mono% 11.7 4.0 - 15.0 %    Eosinophil% 2.2 0.0 - 8.0 %    Basophil% 0.6 0.0 - 1.9 %    Differential Method Automated    Comprehensive metabolic panel    Collection Time: 11/14/17  9:22 AM   Result Value Ref Range    Sodium 141 136 - 145 mmol/L    Potassium 4.7 3.5 - 5.1 mmol/L    Chloride 106 95 - 110 mmol/L    CO2 27 23 - 29 mmol/L    Glucose 109 70 - 110 mg/dL    BUN, Bld 14 8 - 23 mg/dL    Creatinine 1.0 0.5 - 1.4 mg/dL    Calcium 9.6 8.7 - 10.5 mg/dL    Total Protein 7.3 6.0 - 8.4 g/dL    Albumin 3.7 3.5 - 5.2 g/dL    Total Bilirubin 1.2 (H) 0.1 - 1.0 mg/dL    Alkaline Phosphatase 77 55 - 135 U/L    AST 33 10 - 40 U/L    ALT 53 (H) 10 - 44 U/L    Anion Gap 8 8 - 16  mmol/L    eGFR if African American >60 >60 mL/min/1.73 m^2    eGFR if non African American >60 >60 mL/min/1.73 m^2         Assessment:       1. Rheumatoid arthritis of multiple sites with negative rheumatoid factor    2. Psoriasis    3. Primary osteoarthritis of both knees    4. Immunocompromised patient    5. Medication monitoring encounter    6. Chronic colitis    7. Spondylosis of lumbar region without myelopathy or radiculopathy    8. Chronic musculoskeletal pain            1.  Seronegative rheumatoid arthritis -  Who has failed mtx monotheraoy, failed Enbrel, Remicade, Humira, cimzia- best response so far has been from IV Orencia 1000 mg every 4 weeks but we moved him to Orencia 125 mg SC weekly injection with mtx 20 mg weekly to see if we could get better control of RA -still mod DA-  8 Tender/ 1 swollen - cdai 18, RENEE 1.9  2.  Skin psoriasis better on mtx and topical- no scalp rash today   3.  Chronic unspecified colitis  - not UC- Stable  4.  Osteoarthritis of both knees  5.  Vaccines up to date -had yearly flu   6.  Medication Monitoring- no current issues, no evidence of toxicity  7.  Immunocompromised no issues with recurrent infections  8.  Chronic pain on gabapentin 300 mg tid        Plan:        continue mtx 0.8 mL 20 mg sub Q once weekly injection,  Continue with  sub Q orencia 125 mg weekly injection     if fails Orencia would avoid actemra or xelzanz with his prior diverticulitis, not sure what next ? Rituxan   Consider kineret or ilaris as options as well    Keep his  gabapentin at 300 mg tid see if this helps with pain,   Add Cymbalta 30 mg daily X 30 days then increase to 60 mg daily see if this helps his overall pain     Repeat k[facility] hand and foot X-rays in 1.5-2 years    rtc 12-14  weeks with labs    call with any questions, changes, or concerns

## 2017-12-26 ENCOUNTER — PATIENT MESSAGE (OUTPATIENT)
Dept: RHEUMATOLOGY | Facility: CLINIC | Age: 67
End: 2017-12-26

## 2018-01-30 ENCOUNTER — LAB VISIT (OUTPATIENT)
Dept: LAB | Facility: HOSPITAL | Age: 68
End: 2018-01-30
Attending: PHYSICIAN ASSISTANT
Payer: MEDICARE

## 2018-01-30 DIAGNOSIS — M06.09 RHEUMATOID ARTHRITIS OF MULTIPLE SITES WITH NEGATIVE RHEUMATOID FACTOR: Chronic | ICD-10-CM

## 2018-01-30 DIAGNOSIS — Z51.81 MEDICATION MONITORING ENCOUNTER: Chronic | ICD-10-CM

## 2018-01-30 LAB
ALBUMIN SERPL BCP-MCNC: 3.6 G/DL
ALP SERPL-CCNC: 80 U/L
ALT SERPL W/O P-5'-P-CCNC: 48 U/L
ANION GAP SERPL CALC-SCNC: 8 MMOL/L
AST SERPL-CCNC: 38 U/L
BASOPHILS # BLD AUTO: 0.04 K/UL
BASOPHILS NFR BLD: 0.8 %
BILIRUB SERPL-MCNC: 1 MG/DL
BUN SERPL-MCNC: 13 MG/DL
CALCIUM SERPL-MCNC: 8.7 MG/DL
CHLORIDE SERPL-SCNC: 107 MMOL/L
CO2 SERPL-SCNC: 24 MMOL/L
CREAT SERPL-MCNC: 0.9 MG/DL
CRP SERPL-MCNC: 1.7 MG/L
DIFFERENTIAL METHOD: ABNORMAL
EOSINOPHIL # BLD AUTO: 0.2 K/UL
EOSINOPHIL NFR BLD: 3.3 %
ERYTHROCYTE [DISTWIDTH] IN BLOOD BY AUTOMATED COUNT: 14.3 %
EST. GFR  (AFRICAN AMERICAN): >60 ML/MIN/1.73 M^2
EST. GFR  (NON AFRICAN AMERICAN): >60 ML/MIN/1.73 M^2
GLUCOSE SERPL-MCNC: 111 MG/DL
HCT VFR BLD AUTO: 38.6 %
HGB BLD-MCNC: 12.6 G/DL
LYMPHOCYTES # BLD AUTO: 1.1 K/UL
LYMPHOCYTES NFR BLD: 22 %
MCH RBC QN AUTO: 29.9 PG
MCHC RBC AUTO-ENTMCNC: 32.6 G/DL
MCV RBC AUTO: 92 FL
MONOCYTES # BLD AUTO: 0.7 K/UL
MONOCYTES NFR BLD: 14.5 %
NEUTROPHILS # BLD AUTO: 2.9 K/UL
NEUTROPHILS NFR BLD: 58.8 %
NRBC BLD-RTO: 0 /100 WBC
PLATELET # BLD AUTO: 193 K/UL
PMV BLD AUTO: 11.5 FL
POTASSIUM SERPL-SCNC: 4.2 MMOL/L
PROT SERPL-MCNC: 7.2 G/DL
RBC # BLD AUTO: 4.21 M/UL
SODIUM SERPL-SCNC: 139 MMOL/L
WBC # BLD AUTO: 4.9 K/UL

## 2018-01-30 PROCEDURE — 85025 COMPLETE CBC W/AUTO DIFF WBC: CPT

## 2018-01-30 PROCEDURE — 80053 COMPREHEN METABOLIC PANEL: CPT

## 2018-01-30 PROCEDURE — 85651 RBC SED RATE NONAUTOMATED: CPT

## 2018-01-30 PROCEDURE — 36415 COLL VENOUS BLD VENIPUNCTURE: CPT | Mod: PO

## 2018-01-30 PROCEDURE — 86140 C-REACTIVE PROTEIN: CPT

## 2018-01-31 LAB — ERYTHROCYTE [SEDIMENTATION RATE] IN BLOOD BY WESTERGREN METHOD: 15 MM/HR

## 2018-02-06 ENCOUNTER — OFFICE VISIT (OUTPATIENT)
Dept: RHEUMATOLOGY | Facility: CLINIC | Age: 68
End: 2018-02-06
Payer: MEDICARE

## 2018-02-06 VITALS
HEART RATE: 59 BPM | HEIGHT: 67 IN | SYSTOLIC BLOOD PRESSURE: 125 MMHG | WEIGHT: 222.69 LBS | DIASTOLIC BLOOD PRESSURE: 69 MMHG | BODY MASS INDEX: 34.95 KG/M2

## 2018-02-06 DIAGNOSIS — M06.09 RHEUMATOID ARTHRITIS OF MULTIPLE SITES WITH NEGATIVE RHEUMATOID FACTOR: Primary | Chronic | ICD-10-CM

## 2018-02-06 DIAGNOSIS — K52.9 CHRONIC COLITIS: Chronic | ICD-10-CM

## 2018-02-06 DIAGNOSIS — L40.9 PSORIASIS: Chronic | ICD-10-CM

## 2018-02-06 DIAGNOSIS — D84.9 IMMUNOCOMPROMISED PATIENT: Chronic | ICD-10-CM

## 2018-02-06 DIAGNOSIS — Z51.81 MEDICATION MONITORING ENCOUNTER: Chronic | ICD-10-CM

## 2018-02-06 PROCEDURE — 1159F MED LIST DOCD IN RCRD: CPT | Mod: ,,, | Performed by: PHYSICIAN ASSISTANT

## 2018-02-06 PROCEDURE — 99214 OFFICE O/P EST MOD 30 MIN: CPT | Mod: S$PBB,,, | Performed by: PHYSICIAN ASSISTANT

## 2018-02-06 PROCEDURE — 99999 PR PBB SHADOW E&M-EST. PATIENT-LVL III: CPT | Mod: PBBFAC,,, | Performed by: PHYSICIAN ASSISTANT

## 2018-02-06 PROCEDURE — 99213 OFFICE O/P EST LOW 20 MIN: CPT | Mod: PBBFAC,PO | Performed by: PHYSICIAN ASSISTANT

## 2018-02-06 PROCEDURE — 1125F AMNT PAIN NOTED PAIN PRSNT: CPT | Mod: ,,, | Performed by: PHYSICIAN ASSISTANT

## 2018-02-06 ASSESSMENT — CLINICAL DISEASE ACTIVITY INDEX (CDAI)
TOTAL_SCORE: 11
PATIENT_ASSESSMENT: 2
SWOLLEN_JOINTS_COUNT: 1
PHYSICIAN_ASSESSMENT: 2
TENDER_JOINTS_COUNT: 6

## 2018-02-06 ASSESSMENT — ROUTINE ASSESSMENT OF PATIENT INDEX DATA (RAPID3): MDHAQ FUNCTION SCORE: 1.5

## 2018-02-06 NOTE — PROGRESS NOTES
Subjective:       Patient ID: Thomas Sarkar is a 67 y.o. male.    Chief Complaint: No chief complaint on file.      Thomas is back today for rheumatology followup. We have followed thomas for seronegative RA with psoriasis overlap and chronic unspecified Colitis. He is followed by GI. His gi issues have been very quiet. His joints stanley both hands have been the predominate issue.  Failed mtx monotherapy. Failed sulfasalazine. He has failed multiple tnf agents (remicade, enbrel, humira and cimzia),was on IV Orecnica 1000 mg Q 4 weeks  over 1 year at last visit dr booth moved him to Orencia sub Q 125 mg weekly dose to see if this would help better. Insurance denied but we appealed and was able to get. He is still on background  parental mtx 20 mg (0.8 mL) once weekly and folic acid. He did skip his meds for 1 week when he had the flu. No residual issues.  Dry eyes now on restasis.  Overall he reports the Orencia has worked the best of all the meds. He still has a lot of msk pain.  Today pain level rated 5/10. Mostly hand and hips with prolonged stiffness in his joints stanley hands.  Scalp psoriasis comes and goes, scalp pretty clear right now. Reports diverticulitis in the past.  He no longer takes any pain meds they did not help. Now on gabapentin 300 mg tid and Cymbalta 60 mg daily to help some to reduce pain. No side effects.             Psoriasis   Associated symptoms include arthralgias, joint swelling and myalgias. Pertinent negatives include no abdominal pain, chest pain, chills, congestion, coughing, fatigue, fever, nausea, numbness, rash, vomiting or weakness.   Osteoarthritis   Associated symptoms include arthralgias, joint swelling and myalgias. Pertinent negatives include no abdominal pain, chest pain, chills, congestion, coughing, fatigue, fever, nausea, numbness, rash, vomiting or weakness.     Review of Systems   Constitutional: Negative.  Negative for chills, fatigue and fever.   HENT: Negative.   Negative for congestion, mouth sores and trouble swallowing.    Eyes: Negative.  Negative for photophobia, redness and visual disturbance.   Respiratory: Negative.  Negative for cough, shortness of breath and wheezing.    Cardiovascular: Negative.  Negative for chest pain and leg swelling.   Gastrointestinal: Negative.  Negative for abdominal distention, abdominal pain, anal bleeding, blood in stool, constipation, diarrhea, nausea, rectal pain and vomiting.   Genitourinary: Negative.  Negative for dysuria, flank pain, frequency and urgency.   Musculoskeletal: Positive for arthralgias, joint swelling and myalgias. Negative for back pain and gait problem.   Skin: Negative for color change, pallor and rash.   Neurological: Negative.  Negative for dizziness, weakness and numbness.         Objective:     There were no vitals taken for this visit.     Physical Exam   Constitutional: He is oriented to person, place, and time and well-developed, well-nourished, and in no distress. No distress.   HENT:   Head: Normocephalic and atraumatic.   Right Ear: External ear normal.   Left Ear: External ear normal.   Mouth/Throat: No oropharyngeal exudate.   Eyes: Conjunctivae and EOM are normal. Pupils are equal, round, and reactive to light. No scleral icterus.   Neck: Neck supple. No thyromegaly present.   Cardiovascular: Normal rate, regular rhythm and normal heart sounds.    No murmur heard.  Pulmonary/Chest: Effort normal and breath sounds normal. No respiratory distress.   Abdominal: Soft. Bowel sounds are normal.       Right Side Rheumatological Exam     Examination finds the shoulder, elbow, knee, 1st PIP, 1st MCP, 2nd MCP, 3rd MCP, 4th MCP, 5th PIP and 5th MCP normal.    The patient is tender to palpation of the wrist, 2nd PIP, 3rd PIP and 4th PIP    He has swelling of the wrist    Left Side Rheumatological Exam     Examination finds the shoulder, elbow, wrist, knee, 1st PIP, 1st MCP, 2nd PIP, 3rd MCP, 4th MCP and 5th MCP  normal.    The patient is tender to palpation of the 2nd MCP and 3rd PIP.      Lymphadenopathy:     He has no cervical adenopathy.   Neurological: He is alert and oriented to person, place, and time. No cranial nerve deficit. He exhibits normal muscle tone. Gait normal. Coordination normal.   Skin: Skin is warm and dry. No rash noted.          Musculoskeletal: He exhibits edema and tenderness.   Puffiness to several fingers and pip joints with tenderness noted on exam  Decreased range of motion pip joints                  Recent Results (from the past 1008 hour(s))   CBC auto differential    Collection Time: 01/30/18  8:08 AM   Result Value Ref Range    WBC 4.90 3.90 - 12.70 K/uL    RBC 4.21 (L) 4.60 - 6.20 M/uL    Hemoglobin 12.6 (L) 14.0 - 18.0 g/dL    Hematocrit 38.6 (L) 40.0 - 54.0 %    MCV 92 82 - 98 fL    MCH 29.9 27.0 - 31.0 pg    MCHC 32.6 32.0 - 36.0 g/dL    RDW 14.3 11.5 - 14.5 %    Platelets 193 150 - 350 K/uL    MPV 11.5 9.2 - 12.9 fL    Gran # (ANC) 2.9 1.8 - 7.7 K/uL    Lymph # 1.1 1.0 - 4.8 K/uL    Mono # 0.7 0.3 - 1.0 K/uL    Eos # 0.2 0.0 - 0.5 K/uL    Baso # 0.04 0.00 - 0.20 K/uL    nRBC 0 0 /100 WBC    Gran% 58.8 38.0 - 73.0 %    Lymph% 22.0 18.0 - 48.0 %    Mono% 14.5 4.0 - 15.0 %    Eosinophil% 3.3 0.0 - 8.0 %    Basophil% 0.8 0.0 - 1.9 %    Differential Method Automated    Comprehensive metabolic panel    Collection Time: 01/30/18  8:08 AM   Result Value Ref Range    Sodium 139 136 - 145 mmol/L    Potassium 4.2 3.5 - 5.1 mmol/L    Chloride 107 95 - 110 mmol/L    CO2 24 23 - 29 mmol/L    Glucose 111 (H) 70 - 110 mg/dL    BUN, Bld 13 8 - 23 mg/dL    Creatinine 0.9 0.5 - 1.4 mg/dL    Calcium 8.7 8.7 - 10.5 mg/dL    Total Protein 7.2 6.0 - 8.4 g/dL    Albumin 3.6 3.5 - 5.2 g/dL    Total Bilirubin 1.0 0.1 - 1.0 mg/dL    Alkaline Phosphatase 80 55 - 135 U/L    AST 38 10 - 40 U/L    ALT 48 (H) 10 - 44 U/L    Anion Gap 8 8 - 16 mmol/L    eGFR if African American >60.0 >60 mL/min/1.73 m^2    eGFR if non  African American >60.0 >60 mL/min/1.73 m^2   C-reactive protein    Collection Time: 01/30/18  8:08 AM   Result Value Ref Range    CRP 1.7 0.0 - 8.2 mg/L   Sedimentation rate, manual    Collection Time: 01/30/18  8:08 AM   Result Value Ref Range    Sed Rate 15 (H) 0 - 10 mm/Hr         Assessment:       1. Rheumatoid arthritis of multiple sites with negative rheumatoid factor    2. Chronic colitis    3. Psoriasis    4. Medication monitoring encounter    5. Immunocompromised patient            1.  Seronegative rheumatoid arthritis -  Who has failed mtx monotheraoy, failed Enbrel, Remicade, Humira, cimzia- best response so far has been from IV Orencia 1000 mg every 4 weeks but we moved him to Orencia 125 mg SC weekly injection with mtx 20 mg weekly to see if we could get better control of RA -still mod DA-  6 Tender/ 1 swollen - cdai 11, RENEE 1.5  2.  Skin psoriasis better on mtx and topical- no scalp rash today   3.  Chronic unspecified colitis  - not UC- Stable  4.  Osteoarthritis of both knees  5.  Vaccines up to date -had yearly flu vaccine  6.  Medication Monitoring- no current issues, no evidence of toxicity  7.  Immunocompromised no issues with recurrent infections  8.  Chronic pain on gabapentin 300 mg tid and cymbalta 60 mg/d       Plan:        continue mtx 0.8 mL 20 mg sub Q once weekly injection,  Continue with  sub Q orencia 125 mg weekly injection     if fails Orencia would avoid actemra or xelzanz with his prior diverticulitis, not sure what next ? Rituxan   Consider kineret or ilaris as options as well    Keep his  gabapentin at 300 mg tid see if this helps with pain,   C/w  Cymbalta  60 mg daily     Repeat k[facility] hand and foot X-rays in 1.5-2 years    rtc 16  weeks with labs (cbc,cmp, esr, crp)    call with any questions, changes, or concerns

## 2018-02-13 DIAGNOSIS — M06.042 RHEUMATOID ARTHRITIS INVOLVING BOTH HANDS WITH NEGATIVE RHEUMATOID FACTOR: ICD-10-CM

## 2018-02-13 DIAGNOSIS — M06.041 RHEUMATOID ARTHRITIS INVOLVING BOTH HANDS WITH NEGATIVE RHEUMATOID FACTOR: ICD-10-CM

## 2018-02-13 RX ORDER — SYRINGE WITH NEEDLE, 1 ML 25GX5/8"
SYRINGE, EMPTY DISPOSABLE MISCELLANEOUS
Qty: 100 SYRINGE | Refills: 0 | Status: SHIPPED | OUTPATIENT
Start: 2018-02-13 | End: 2018-02-16 | Stop reason: SDUPTHER

## 2018-02-13 RX ORDER — METHOTREXATE 25 MG/ML
INJECTION, SOLUTION INTRA-ARTERIAL; INTRAMUSCULAR; INTRAVENOUS
Qty: 10 ML | Refills: 4 | Status: SHIPPED | OUTPATIENT
Start: 2018-02-13 | End: 2018-02-16 | Stop reason: SDUPTHER

## 2018-02-13 RX ORDER — FOLIC ACID 1 MG/1
TABLET ORAL
Qty: 90 TABLET | Refills: 3 | Status: SHIPPED | OUTPATIENT
Start: 2018-02-13 | End: 2018-02-16 | Stop reason: SDUPTHER

## 2018-02-15 DIAGNOSIS — M06.042 RHEUMATOID ARTHRITIS INVOLVING BOTH HANDS WITH NEGATIVE RHEUMATOID FACTOR: ICD-10-CM

## 2018-02-15 DIAGNOSIS — M47.816 SPONDYLOSIS OF LUMBAR REGION WITHOUT MYELOPATHY OR RADICULOPATHY: Chronic | ICD-10-CM

## 2018-02-15 DIAGNOSIS — M06.041 RHEUMATOID ARTHRITIS INVOLVING BOTH HANDS WITH NEGATIVE RHEUMATOID FACTOR: ICD-10-CM

## 2018-02-15 DIAGNOSIS — M17.0 PRIMARY OSTEOARTHRITIS OF BOTH KNEES: ICD-10-CM

## 2018-02-15 DIAGNOSIS — G89.29 CHRONIC MUSCULOSKELETAL PAIN: ICD-10-CM

## 2018-02-15 DIAGNOSIS — M79.18 CHRONIC MUSCULOSKELETAL PAIN: ICD-10-CM

## 2018-02-15 DIAGNOSIS — M06.09 RHEUMATOID ARTHRITIS OF MULTIPLE SITES WITH NEGATIVE RHEUMATOID FACTOR: Chronic | ICD-10-CM

## 2018-02-15 RX ORDER — ABATACEPT 125 MG/ML
INJECTION, SOLUTION SUBCUTANEOUS
Qty: 4 ML | Refills: 6 | Status: SHIPPED | OUTPATIENT
Start: 2018-02-15 | End: 2018-05-10 | Stop reason: ALTCHOICE

## 2018-02-16 RX ORDER — DULOXETIN HYDROCHLORIDE 60 MG/1
60 CAPSULE, DELAYED RELEASE ORAL DAILY
Qty: 90 CAPSULE | Refills: 1 | Status: SHIPPED | OUTPATIENT
Start: 2018-02-16 | End: 2018-11-03 | Stop reason: SDUPTHER

## 2018-02-16 RX ORDER — GABAPENTIN 300 MG/1
300 CAPSULE ORAL 3 TIMES DAILY
Qty: 270 CAPSULE | Refills: 3 | Status: SHIPPED | OUTPATIENT
Start: 2018-02-16 | End: 2018-05-10 | Stop reason: SDUPTHER

## 2018-02-16 RX ORDER — METHOTREXATE 25 MG/ML
20 INJECTION, SOLUTION INTRA-ARTERIAL; INTRAMUSCULAR; INTRAVENOUS
Qty: 10 ML | Refills: 4 | Status: SHIPPED | OUTPATIENT
Start: 2018-02-16 | End: 2018-08-01

## 2018-02-16 RX ORDER — FOLIC ACID 1 MG/1
1000 TABLET ORAL DAILY
Qty: 90 TABLET | Refills: 3 | Status: SHIPPED | OUTPATIENT
Start: 2018-02-16 | End: 2018-10-01

## 2018-05-04 ENCOUNTER — PATIENT MESSAGE (OUTPATIENT)
Dept: RHEUMATOLOGY | Facility: CLINIC | Age: 68
End: 2018-05-04

## 2018-05-10 ENCOUNTER — LAB VISIT (OUTPATIENT)
Dept: LAB | Facility: HOSPITAL | Age: 68
End: 2018-05-10
Attending: INTERNAL MEDICINE
Payer: MEDICARE

## 2018-05-10 ENCOUNTER — OFFICE VISIT (OUTPATIENT)
Dept: RHEUMATOLOGY | Facility: CLINIC | Age: 68
End: 2018-05-10
Payer: MEDICARE

## 2018-05-10 VITALS
BODY MASS INDEX: 35.43 KG/M2 | WEIGHT: 225.75 LBS | DIASTOLIC BLOOD PRESSURE: 72 MMHG | HEIGHT: 67 IN | HEART RATE: 63 BPM | SYSTOLIC BLOOD PRESSURE: 132 MMHG

## 2018-05-10 DIAGNOSIS — D84.9 IMMUNOCOMPROMISED PATIENT: Chronic | ICD-10-CM

## 2018-05-10 DIAGNOSIS — M06.09 RHEUMATOID ARTHRITIS OF MULTIPLE SITES WITH NEGATIVE RHEUMATOID FACTOR: Primary | Chronic | ICD-10-CM

## 2018-05-10 DIAGNOSIS — K52.9 CHRONIC COLITIS: Chronic | ICD-10-CM

## 2018-05-10 DIAGNOSIS — Z11.1 SCREENING-PULMONARY TB: ICD-10-CM

## 2018-05-10 DIAGNOSIS — I10 ESSENTIAL (PRIMARY) HYPERTENSION: ICD-10-CM

## 2018-05-10 DIAGNOSIS — M47.816 SPONDYLOSIS OF LUMBAR REGION WITHOUT MYELOPATHY OR RADICULOPATHY: Chronic | ICD-10-CM

## 2018-05-10 DIAGNOSIS — Z13.220 SCREENING CHOLESTEROL LEVEL: ICD-10-CM

## 2018-05-10 DIAGNOSIS — M17.0 PRIMARY OSTEOARTHRITIS OF BOTH KNEES: ICD-10-CM

## 2018-05-10 DIAGNOSIS — Z51.81 MEDICATION MONITORING ENCOUNTER: Chronic | ICD-10-CM

## 2018-05-10 DIAGNOSIS — M06.09 RHEUMATOID ARTHRITIS OF MULTIPLE SITES WITH NEGATIVE RHEUMATOID FACTOR: Chronic | ICD-10-CM

## 2018-05-10 LAB
ALBUMIN SERPL BCP-MCNC: 3.8 G/DL
ALP SERPL-CCNC: 84 U/L
ALT SERPL W/O P-5'-P-CCNC: 31 U/L
ANION GAP SERPL CALC-SCNC: 11 MMOL/L
AST SERPL-CCNC: 25 U/L
BASOPHILS # BLD AUTO: 0.01 K/UL
BASOPHILS NFR BLD: 0.2 %
BILIRUB SERPL-MCNC: 0.7 MG/DL
BUN SERPL-MCNC: 16 MG/DL
CALCIUM SERPL-MCNC: 9.8 MG/DL
CHLORIDE SERPL-SCNC: 106 MMOL/L
CO2 SERPL-SCNC: 25 MMOL/L
CREAT SERPL-MCNC: 1.2 MG/DL
CRP SERPL-MCNC: 2.2 MG/L
DIFFERENTIAL METHOD: ABNORMAL
EOSINOPHIL # BLD AUTO: 0.1 K/UL
EOSINOPHIL NFR BLD: 2.1 %
ERYTHROCYTE [DISTWIDTH] IN BLOOD BY AUTOMATED COUNT: 14.3 %
ERYTHROCYTE [SEDIMENTATION RATE] IN BLOOD BY WESTERGREN METHOD: 14 MM/HR
EST. GFR  (AFRICAN AMERICAN): >60 ML/MIN/1.73 M^2
EST. GFR  (NON AFRICAN AMERICAN): >60 ML/MIN/1.73 M^2
GLUCOSE SERPL-MCNC: 84 MG/DL
HCT VFR BLD AUTO: 39.7 %
HGB BLD-MCNC: 13.3 G/DL
LYMPHOCYTES # BLD AUTO: 1.1 K/UL
LYMPHOCYTES NFR BLD: 20.9 %
MCH RBC QN AUTO: 31.2 PG
MCHC RBC AUTO-ENTMCNC: 33.5 G/DL
MCV RBC AUTO: 93 FL
MONOCYTES # BLD AUTO: 0.7 K/UL
MONOCYTES NFR BLD: 12.3 %
NEUTROPHILS # BLD AUTO: 3.5 K/UL
NEUTROPHILS NFR BLD: 64.5 %
PLATELET # BLD AUTO: 176 K/UL
PMV BLD AUTO: 11.2 FL
POTASSIUM SERPL-SCNC: 4.7 MMOL/L
PROT SERPL-MCNC: 7.2 G/DL
RBC # BLD AUTO: 4.26 M/UL
SODIUM SERPL-SCNC: 142 MMOL/L
WBC # BLD AUTO: 5.35 K/UL

## 2018-05-10 PROCEDURE — 86140 C-REACTIVE PROTEIN: CPT

## 2018-05-10 PROCEDURE — 80053 COMPREHEN METABOLIC PANEL: CPT | Mod: PO

## 2018-05-10 PROCEDURE — 99214 OFFICE O/P EST MOD 30 MIN: CPT | Mod: PBBFAC,PO | Performed by: PHYSICIAN ASSISTANT

## 2018-05-10 PROCEDURE — 99215 OFFICE O/P EST HI 40 MIN: CPT | Mod: S$PBB,,, | Performed by: PHYSICIAN ASSISTANT

## 2018-05-10 PROCEDURE — 36415 COLL VENOUS BLD VENIPUNCTURE: CPT | Mod: PO

## 2018-05-10 PROCEDURE — 85025 COMPLETE CBC W/AUTO DIFF WBC: CPT | Mod: PO

## 2018-05-10 PROCEDURE — 85651 RBC SED RATE NONAUTOMATED: CPT | Mod: PO

## 2018-05-10 PROCEDURE — 80061 LIPID PANEL: CPT | Mod: PO

## 2018-05-10 PROCEDURE — 99999 PR PBB SHADOW E&M-EST. PATIENT-LVL IV: CPT | Mod: PBBFAC,,, | Performed by: PHYSICIAN ASSISTANT

## 2018-05-10 RX ORDER — GABAPENTIN 300 MG/1
600 CAPSULE ORAL 3 TIMES DAILY
Qty: 540 CAPSULE | Refills: 3 | Status: SHIPPED | OUTPATIENT
Start: 2018-05-10 | End: 2019-03-18 | Stop reason: SDUPTHER

## 2018-05-10 RX ORDER — TOFACITINIB 11 MG/1
11 TABLET, FILM COATED, EXTENDED RELEASE ORAL DAILY
Qty: 30 TABLET | Refills: 6 | Status: SHIPPED | OUTPATIENT
Start: 2018-05-10 | End: 2018-06-27 | Stop reason: SINTOL

## 2018-05-10 ASSESSMENT — CLINICAL DISEASE ACTIVITY INDEX (CDAI)
PATIENT_ASSESSMENT: 6
TENDER_JOINTS_COUNT: 9
TOTAL_SCORE: 25
SWOLLEN_JOINTS_COUNT: 5
PHYSICIAN_ASSESSMENT: 5

## 2018-05-10 ASSESSMENT — ROUTINE ASSESSMENT OF PATIENT INDEX DATA (RAPID3): MDHAQ FUNCTION SCORE: 1.5

## 2018-05-10 NOTE — PROGRESS NOTES
Subjective:       Patient ID: Thomas Sarkar is a 67 y.o. male.    Chief Complaint: Rheumatoid Arthritis; Psoriasis; and immunocompromised      Thomas is back today for rheumatology followup.     We have followed thomas for seronegative RA with psoriasis overlap and chronic unspecified Colitis. He is followed by GI. His gi issues have been very quiet. He has been treated and failed mtx monotherapy and sulfasalazine. He has failed multiple tnf agents (remicade, enbrel, humira and cimzia),was on IV Orecnica 1000 mg Q 4 weeks  over 1 year at last visit dr booth moved him to Orencia sub Q 125 mg weekly dose to see if this would help better. He is still on background  parental mtx 20 mg (0.8 mL) once weekly and folic acid.     He continues to have prolonged stiffness in his joints stanley hands and hips. Chronic pain in his hands, hips, knees and shoulders. Chronic joint swelling. He had sleep study done. The test reported pain at night interfering with his ability to enter into deep sleep. Recommends maximizing his meds and trying to better control his RA. Today pain 6/10.       Orencia has worked the best of all the agents he has been on but we have never been able to achieve remission of his RA. Scalp psoriasis comes and goes, scalp pretty clear right now. He reports never having any issues with diverticulitis in the past.  He no longer takes any pain meds they did not help. Now on gabapentin 300 mg tid and Cymbalta 60 mg daily to help some to reduce pain. No side effects.             Psoriasis   Associated symptoms include arthralgias, joint swelling and myalgias. Pertinent negatives include no abdominal pain, chest pain, chills, congestion, coughing, fatigue, fever, nausea, numbness, rash, vomiting or weakness.   Osteoarthritis   Associated symptoms include arthralgias, joint swelling and myalgias. Pertinent negatives include no abdominal pain, chest pain, chills, congestion, coughing, fatigue, fever, nausea,  "numbness, rash, vomiting or weakness.     Review of Systems   Constitutional: Negative.  Negative for chills, fatigue and fever.   HENT: Negative.  Negative for congestion, mouth sores and trouble swallowing.    Eyes: Negative.  Negative for photophobia, redness and visual disturbance.   Respiratory: Negative.  Negative for cough, shortness of breath and wheezing.    Cardiovascular: Negative.  Negative for chest pain and leg swelling.   Gastrointestinal: Negative.  Negative for abdominal distention, abdominal pain, anal bleeding, blood in stool, constipation, diarrhea, nausea, rectal pain and vomiting.   Genitourinary: Negative.  Negative for dysuria, flank pain, frequency and urgency.   Musculoskeletal: Positive for arthralgias, joint swelling and myalgias. Negative for back pain and gait problem.   Skin: Negative for color change, pallor and rash.   Neurological: Negative.  Negative for dizziness, weakness and numbness.         Objective:     /72   Pulse 63   Ht 5' 7" (1.702 m)   Wt 102.4 kg (225 lb 12 oz)   BMI 35.36 kg/m²      Physical Exam   Constitutional: He is oriented to person, place, and time and well-developed, well-nourished, and in no distress. No distress.   HENT:   Head: Normocephalic and atraumatic.   Right Ear: External ear normal.   Left Ear: External ear normal.   Mouth/Throat: No oropharyngeal exudate.   Eyes: Conjunctivae and EOM are normal. Pupils are equal, round, and reactive to light. No scleral icterus.   Neck: Neck supple. No thyromegaly present.   Cardiovascular: Normal rate, regular rhythm and normal heart sounds.    No murmur heard.  Pulmonary/Chest: Effort normal and breath sounds normal. No respiratory distress.   Abdominal: Soft. Bowel sounds are normal.       Right Side Rheumatological Exam     Examination finds the elbow, knee, 1st PIP, 1st MCP, 4th MCP, 5th PIP and 5th MCP normal.    The patient is tender to palpation of the shoulder, wrist, 2nd PIP, 3rd PIP and 4th " PIP    He has swelling of the wrist, 2nd MCP and 3rd MCP    Left Side Rheumatological Exam     Examination finds the elbow, knee, 1st PIP, 1st MCP, 2nd PIP, 4th MCP and 5th MCP normal.    The patient is tender to palpation of the shoulder, wrist, 2nd MCP and 3rd PIP.    He has swelling of the 2nd MCP and 3rd MCP      Lymphadenopathy:     He has no cervical adenopathy.   Neurological: He is alert and oriented to person, place, and time. No cranial nerve deficit. He exhibits normal muscle tone. Gait normal. Coordination normal.   Skin: Skin is warm and dry. No rash noted.          Musculoskeletal: He exhibits edema and tenderness.   Puffiness to several fingers and pip joints with tenderness noted on exam  Decreased range of motion pip joints                  Recent Results (from the past 1008 hour(s))   CBC auto differential    Collection Time: 05/10/18 12:37 PM   Result Value Ref Range    WBC 5.35 3.90 - 12.70 K/uL    RBC 4.26 (L) 4.60 - 6.20 M/uL    Hemoglobin 13.3 (L) 14.0 - 18.0 g/dL    Hematocrit 39.7 (L) 40.0 - 54.0 %    MCV 93 82 - 98 fL    MCH 31.2 (H) 27.0 - 31.0 pg    MCHC 33.5 32.0 - 36.0 g/dL    RDW 14.3 11.5 - 14.5 %    Platelets 176 150 - 350 K/uL    MPV 11.2 9.2 - 12.9 fL    Gran # (ANC) 3.5 1.8 - 7.7 K/uL    Lymph # 1.1 1.0 - 4.8 K/uL    Mono # 0.7 0.3 - 1.0 K/uL    Eos # 0.1 0.0 - 0.5 K/uL    Baso # 0.01 0.00 - 0.20 K/uL    Gran% 64.5 38.0 - 73.0 %    Lymph% 20.9 18.0 - 48.0 %    Mono% 12.3 4.0 - 15.0 %    Eosinophil% 2.1 0.0 - 8.0 %    Basophil% 0.2 0.0 - 1.9 %    Differential Method Automated    Comprehensive metabolic panel    Collection Time: 05/10/18 12:37 PM   Result Value Ref Range    Sodium 142 136 - 145 mmol/L    Potassium 4.7 3.5 - 5.1 mmol/L    Chloride 106 95 - 110 mmol/L    CO2 25 23 - 29 mmol/L    Glucose 84 70 - 110 mg/dL    BUN, Bld 16 8 - 23 mg/dL    Creatinine 1.2 0.5 - 1.4 mg/dL    Calcium 9.8 8.7 - 10.5 mg/dL    Total Protein 7.2 6.0 - 8.4 g/dL    Albumin 3.8 3.5 - 5.2 g/dL     Total Bilirubin 0.7 0.1 - 1.0 mg/dL    Alkaline Phosphatase 84 55 - 135 U/L    AST 25 10 - 40 U/L    ALT 31 10 - 44 U/L    Anion Gap 11 8 - 16 mmol/L    eGFR if African American >60 >60 mL/min/1.73 m^2    eGFR if non African American >60 >60 mL/min/1.73 m^2         Assessment:       1. Rheumatoid arthritis of multiple sites with negative rheumatoid factor    2. Spondylosis of lumbar region without myelopathy or radiculopathy    3. Primary osteoarthritis of both knees    4. Chronic colitis    5. Medication monitoring encounter    6. Immunocompromised patient    7. Screening-pulmonary TB    8. Screening cholesterol level    9. Essential (primary) hypertension             1.  Seronegative rheumatoid arthritis -  Who has failed mtx monotheraoy, failed Enbrel, Remicade, Humira, cimzia- IV Orencia 1000 mg every 4 - now on Orencia 125 mg SC weekly injection with mtx 20 mg weekly to see if we could get better control of RA -still mod DA-  9 Tender/ 5 swollen - cdai 25, RENEE 1.5    2.  Skin psoriasis better on mtx and topical- no scalp rash today     3.  Chronic unspecified colitis  - not UC- Stable    4.  Osteoarthritis of both knees    5.  Vaccines up to date -had yearly flu vaccine    6.  Medication Monitoring- no current issues, no evidence of toxicity    7.  Immunocompromised no issues with recurrent infections    8.  Chronic pain on gabapentin 300 mg tid and cymbalta 60 mg/d    9. Nocturnal pain issues interfering with normal sleep pattern contributing to daytime sleepiness         Plan:        continue mtx 0.8 mL 20 mg sub Q once weekly injection,  Stop orencia and move to xeljanz 11 mg/day  Always remember to Hold xeljanz and mtx for signs of infection or for surgery   Discussed risk versus benefits and potential side effects of xeljanz. Medication Literature given to patient  If any abd pain occures and persits more than 1-2 days notify us immediately  He does not remember ever having diverticulitis just the colitis  issues  Zoster vaccine done 2013 Monday morning fasting lipid and quant gold    Increase his night time dose of  gabapentin to 600 mg, keep morning and afternoon dose at  300 mg   Can go up to 900 mg at night if needed    Next visit consider increasing his Cymbalta to  60 mg bid but for now will c/w once daily     Repeat k[facility] hand and foot X-rays in 1.5-2 years    rtc 5  weeks with labs (cbc,cmp, esr, crp)    call with any questions, changes, or concerns      Complicated established pt- extended visit  Total time with pt 45 min at least 1/2 time (24 min) in face to face counseling discussing new issues and dx RA and how contributes, discussed xeljanz treatement and future options as well as  risk vs benefits of meds. Time also spent in coordination of care and reviewing prior health records

## 2018-05-14 ENCOUNTER — LAB VISIT (OUTPATIENT)
Dept: LAB | Facility: HOSPITAL | Age: 68
End: 2018-05-14
Attending: PHYSICIAN ASSISTANT
Payer: MEDICARE

## 2018-05-14 DIAGNOSIS — I10 ESSENTIAL (PRIMARY) HYPERTENSION: ICD-10-CM

## 2018-05-14 DIAGNOSIS — M06.09 RHEUMATOID ARTHRITIS OF MULTIPLE SITES WITH NEGATIVE RHEUMATOID FACTOR: Chronic | ICD-10-CM

## 2018-05-14 DIAGNOSIS — Z13.220 SCREENING CHOLESTEROL LEVEL: ICD-10-CM

## 2018-05-14 DIAGNOSIS — Z51.81 MEDICATION MONITORING ENCOUNTER: Chronic | ICD-10-CM

## 2018-05-14 DIAGNOSIS — D84.9 IMMUNOCOMPROMISED PATIENT: Chronic | ICD-10-CM

## 2018-05-14 LAB
CHOLEST SERPL-MCNC: 179 MG/DL
CHOLEST/HDLC SERPL: 3 {RATIO}
HDLC SERPL-MCNC: 59 MG/DL
HDLC SERPL: 33 %
LDLC SERPL CALC-MCNC: 78.4 MG/DL
NONHDLC SERPL-MCNC: 120 MG/DL
TRIGL SERPL-MCNC: 208 MG/DL

## 2018-05-16 ENCOUNTER — PATIENT MESSAGE (OUTPATIENT)
Dept: RHEUMATOLOGY | Facility: CLINIC | Age: 68
End: 2018-05-16

## 2018-05-17 ENCOUNTER — TELEPHONE (OUTPATIENT)
Dept: RHEUMATOLOGY | Facility: CLINIC | Age: 68
End: 2018-05-17

## 2018-05-22 ENCOUNTER — TELEPHONE (OUTPATIENT)
Dept: RHEUMATOLOGY | Facility: CLINIC | Age: 68
End: 2018-05-22

## 2018-05-22 NOTE — TELEPHONE ENCOUNTER
Spoke with Skip and informed her that I do not see papers at this time but the nurse who handles the faxes is currently out of the clinic. A staff message was delivered to check on status of patient's Xeljanz application.

## 2018-05-22 NOTE — TELEPHONE ENCOUNTER
----- Message from Ani Olvera sent at 5/22/2018  3:42 PM CDT -----  Contact: Jennifer/ Skip  Caller states she faxed a BI Summary on 5/16 and needs to know if it was received. Also status of Enrollment Application for Xeljanz. [phone]

## 2018-05-24 ENCOUNTER — TELEPHONE (OUTPATIENT)
Dept: RHEUMATOLOGY | Facility: CLINIC | Age: 68
End: 2018-05-24

## 2018-05-24 NOTE — TELEPHONE ENCOUNTER
----- Message from Danie Oconnell LPN sent at 5/22/2018  4:07 PM CDT -----  Maryana Ng do you have papers for this patient?    Caller: Lila Valdivia (Today,  3:42 PM)           Caller states she faxed a BI Summary on 5/16 and needs to know if it was received. Also status of Enrollment Application for Xeljanz. [phone]

## 2018-05-25 ENCOUNTER — TELEPHONE (OUTPATIENT)
Dept: RHEUMATOLOGY | Facility: CLINIC | Age: 68
End: 2018-05-25

## 2018-05-25 NOTE — TELEPHONE ENCOUNTER
----- Message from Danie Oconnell LPN sent at 5/22/2018  4:07 PM CDT -----  Maryana Ng do you have papers for this patient?    Caller: Lila Valdivia (Today,  3:42 PM)           Caller states she faxed a BI Summary on 5/16 and needs to know if it was received. Also status of Enrollment Application for Xeljanz. [phone]

## 2018-06-26 ENCOUNTER — LAB VISIT (OUTPATIENT)
Dept: LAB | Facility: HOSPITAL | Age: 68
End: 2018-06-26
Attending: PHYSICIAN ASSISTANT
Payer: MEDICARE

## 2018-06-26 DIAGNOSIS — M06.09 RHEUMATOID ARTHRITIS OF MULTIPLE SITES WITH NEGATIVE RHEUMATOID FACTOR: Chronic | ICD-10-CM

## 2018-06-26 LAB
ALBUMIN SERPL BCP-MCNC: 4 G/DL
ALP SERPL-CCNC: 100 U/L
ALT SERPL W/O P-5'-P-CCNC: 78 U/L
ANION GAP SERPL CALC-SCNC: 13 MMOL/L
AST SERPL-CCNC: 43 U/L
BASOPHILS # BLD AUTO: 0.02 K/UL
BASOPHILS NFR BLD: 0.3 %
BILIRUB SERPL-MCNC: 1.6 MG/DL
BUN SERPL-MCNC: 16 MG/DL
CALCIUM SERPL-MCNC: 9.4 MG/DL
CHLORIDE SERPL-SCNC: 106 MMOL/L
CO2 SERPL-SCNC: 20 MMOL/L
CREAT SERPL-MCNC: 1.1 MG/DL
CRP SERPL-MCNC: 6.8 MG/L
DIFFERENTIAL METHOD: ABNORMAL
EOSINOPHIL # BLD AUTO: 0.1 K/UL
EOSINOPHIL NFR BLD: 2.1 %
ERYTHROCYTE [DISTWIDTH] IN BLOOD BY AUTOMATED COUNT: 14.3 %
ERYTHROCYTE [SEDIMENTATION RATE] IN BLOOD BY WESTERGREN METHOD: 22 MM/HR
EST. GFR  (AFRICAN AMERICAN): >60 ML/MIN/1.73 M^2
EST. GFR  (NON AFRICAN AMERICAN): >60 ML/MIN/1.73 M^2
GLUCOSE SERPL-MCNC: 100 MG/DL
HCT VFR BLD AUTO: 38.2 %
HGB BLD-MCNC: 13.3 G/DL
LYMPHOCYTES # BLD AUTO: 0.6 K/UL
LYMPHOCYTES NFR BLD: 10.6 %
MCH RBC QN AUTO: 31.5 PG
MCHC RBC AUTO-ENTMCNC: 34.8 G/DL
MCV RBC AUTO: 91 FL
MONOCYTES # BLD AUTO: 0.8 K/UL
MONOCYTES NFR BLD: 13 %
NEUTROPHILS # BLD AUTO: 4.3 K/UL
NEUTROPHILS NFR BLD: 74 %
PLATELET # BLD AUTO: 198 K/UL
PMV BLD AUTO: 11 FL
POTASSIUM SERPL-SCNC: 3.7 MMOL/L
PROT SERPL-MCNC: 7.5 G/DL
RBC # BLD AUTO: 4.22 M/UL
SODIUM SERPL-SCNC: 139 MMOL/L
WBC # BLD AUTO: 5.83 K/UL

## 2018-06-26 PROCEDURE — 85025 COMPLETE CBC W/AUTO DIFF WBC: CPT | Mod: PO

## 2018-06-26 PROCEDURE — 80053 COMPREHEN METABOLIC PANEL: CPT | Mod: PO

## 2018-06-26 PROCEDURE — 85651 RBC SED RATE NONAUTOMATED: CPT | Mod: PO

## 2018-06-26 PROCEDURE — 36415 COLL VENOUS BLD VENIPUNCTURE: CPT | Mod: PO

## 2018-06-26 PROCEDURE — 86140 C-REACTIVE PROTEIN: CPT

## 2018-06-27 ENCOUNTER — OFFICE VISIT (OUTPATIENT)
Dept: RHEUMATOLOGY | Facility: CLINIC | Age: 68
End: 2018-06-27
Payer: MEDICARE

## 2018-06-27 VITALS
SYSTOLIC BLOOD PRESSURE: 132 MMHG | BODY MASS INDEX: 34.22 KG/M2 | HEART RATE: 68 BPM | DIASTOLIC BLOOD PRESSURE: 70 MMHG | WEIGHT: 218.06 LBS | HEIGHT: 67 IN

## 2018-06-27 DIAGNOSIS — M17.0 PRIMARY OSTEOARTHRITIS OF BOTH KNEES: ICD-10-CM

## 2018-06-27 DIAGNOSIS — R74.8 ELEVATED LIVER ENZYMES: ICD-10-CM

## 2018-06-27 DIAGNOSIS — M06.09 RHEUMATOID ARTHRITIS OF MULTIPLE SITES WITH NEGATIVE RHEUMATOID FACTOR: Primary | Chronic | ICD-10-CM

## 2018-06-27 DIAGNOSIS — L40.9 PSORIASIS: Chronic | ICD-10-CM

## 2018-06-27 DIAGNOSIS — D84.9 IMMUNOCOMPROMISED PATIENT: Chronic | ICD-10-CM

## 2018-06-27 DIAGNOSIS — K52.9 CHRONIC COLITIS: Chronic | ICD-10-CM

## 2018-06-27 DIAGNOSIS — Z51.81 MEDICATION MONITORING ENCOUNTER: Chronic | ICD-10-CM

## 2018-06-27 DIAGNOSIS — E66.9 OBESITY, CLASS II, BMI 35-39.9, NO COMORBIDITY: ICD-10-CM

## 2018-06-27 PROCEDURE — 99999 PR PBB SHADOW E&M-EST. PATIENT-LVL IV: CPT | Mod: PBBFAC,,, | Performed by: PHYSICIAN ASSISTANT

## 2018-06-27 PROCEDURE — 99214 OFFICE O/P EST MOD 30 MIN: CPT | Mod: PBBFAC,PO | Performed by: PHYSICIAN ASSISTANT

## 2018-06-27 PROCEDURE — 99214 OFFICE O/P EST MOD 30 MIN: CPT | Mod: S$PBB,,, | Performed by: PHYSICIAN ASSISTANT

## 2018-06-27 RX ORDER — ZOLPIDEM TARTRATE 5 MG/1
5 TABLET ORAL NIGHTLY PRN
COMMUNITY
End: 2018-09-05 | Stop reason: ALTCHOICE

## 2018-06-27 RX ORDER — TOCILIZUMAB 180 MG/ML
162 INJECTION, SOLUTION SUBCUTANEOUS
Qty: 4 SYRINGE | Refills: 6 | Status: SHIPPED | OUTPATIENT
Start: 2018-06-27 | End: 2018-09-05 | Stop reason: SINTOL

## 2018-06-27 NOTE — PROGRESS NOTES
Subjective:       Patient ID: Thomas Sarkar is a 67 y.o. male.    Chief Complaint: Rheumatoid Arthritis      Thomas is back today for rheumatology followup.     We have followed thomas for seronegative RA with psoriasis overlap and chronic unspecified Colitis. He is followed by GI. His gi issues have been very quiet. He has been treated and failed mtx monotherapy and sulfasalazine. He has failed multiple tnf agents (remicade, enbrel, humira and cimzia),was on IV Orecnica 1000 mg Q 4 weeks  over 1 year at last visit dr booth moved him to Orencia sub Q 125 mg weekly dose to see if this would help better. He is still on background  parental mtx 20 mg (0.8 mL) once weekly and folic acid.     Last visit he continued to have prolonged stiffness in his joints stanley hands and hips. Chronic pain in his hands, hips, knees and shoulders. Chronic joint swelling. He had sleep study done. The test reported pain at night interfering with his ability to enter into deep sleep. Recommends maximizing his meds and trying to better control his RA. We moved to xeljanz 11 mg/d oral tablet. He reports he took about 2 weeks. 5 days in developed severe persistent diarrhea. He stopped the xejanz 1 week ago and the symtoms resolved withing 1 day of stopping. His joints are unchanged Today pain 5/10.       Orencia has worked the best of all the agents he has been on but we have never been able to achieve remission of his RA. Scalp psoriasis comes and goes, scalp pretty clear right now. He reports never having any issues with diverticulitis in the past.  He no longer takes any pain meds they did not help. Now on gabapentin and Cymbalta  daily to help some to reduce pain. No side effects.             Psoriasis   Associated symptoms include arthralgias, joint swelling and myalgias. Pertinent negatives include no abdominal pain, chest pain, chills, congestion, coughing, fatigue, fever, nausea, numbness, rash, vomiting or weakness.  "  Osteoarthritis   Associated symptoms include arthralgias, joint swelling and myalgias. Pertinent negatives include no abdominal pain, chest pain, chills, congestion, coughing, fatigue, fever, nausea, numbness, rash, vomiting or weakness.     Review of Systems   Constitutional: Negative.  Negative for chills, fatigue and fever.   HENT: Negative.  Negative for congestion, mouth sores and trouble swallowing.    Eyes: Negative.  Negative for photophobia, redness and visual disturbance.   Respiratory: Negative.  Negative for cough, shortness of breath and wheezing.    Cardiovascular: Negative.  Negative for chest pain and leg swelling.   Gastrointestinal: Negative.  Negative for abdominal distention, abdominal pain, anal bleeding, blood in stool, constipation, diarrhea, nausea, rectal pain and vomiting.   Genitourinary: Negative.  Negative for dysuria, flank pain, frequency and urgency.   Musculoskeletal: Positive for arthralgias, joint swelling and myalgias. Negative for back pain and gait problem.   Skin: Negative for color change, pallor and rash.   Neurological: Negative.  Negative for dizziness, weakness and numbness.         Objective:     /70   Pulse 68   Ht 5' 7" (1.702 m)   Wt 98.9 kg (218 lb 0.6 oz)   BMI 34.15 kg/m²      Physical Exam   Constitutional: He is oriented to person, place, and time and well-developed, well-nourished, and in no distress. No distress.   HENT:   Head: Normocephalic and atraumatic.   Right Ear: External ear normal.   Left Ear: External ear normal.   Mouth/Throat: No oropharyngeal exudate.   Eyes: Conjunctivae and EOM are normal. Pupils are equal, round, and reactive to light. No scleral icterus.   Neck: Neck supple. No thyromegaly present.   Cardiovascular: Normal rate, regular rhythm and normal heart sounds.    No murmur heard.  Pulmonary/Chest: Effort normal and breath sounds normal. No respiratory distress.   Abdominal: Soft. Bowel sounds are normal.       Right Side " Rheumatological Exam     Examination finds the elbow, knee, 1st PIP, 1st MCP, 4th MCP, 5th PIP and 5th MCP normal.    The patient is tender to palpation of the shoulder, wrist, 2nd PIP, 3rd PIP and 4th PIP    He has swelling of the wrist, 2nd MCP and 3rd MCP    Left Side Rheumatological Exam     Examination finds the elbow, knee, 1st PIP, 1st MCP, 2nd PIP, 4th MCP and 5th MCP normal.    The patient is tender to palpation of the shoulder, wrist, 2nd MCP and 3rd PIP.    He has swelling of the 2nd MCP and 3rd MCP      Lymphadenopathy:     He has no cervical adenopathy.   Neurological: He is alert and oriented to person, place, and time. No cranial nerve deficit. He exhibits normal muscle tone. Gait normal. Coordination normal.   Skin: Skin is warm and dry. No rash noted.          Musculoskeletal: He exhibits edema and tenderness.   Puffiness to several fingers and pip joints with tenderness noted on exam  Decreased range of motion pip joints                  Recent Results (from the past 1008 hour(s))   Sedimentation rate, manual    Collection Time: 06/26/18  8:02 AM   Result Value Ref Range    Sed Rate 22 (H) 0 - 10 mm/Hr   C-reactive protein    Collection Time: 06/26/18  8:02 AM   Result Value Ref Range    CRP 6.8 0.0 - 8.2 mg/L   Comprehensive metabolic panel    Collection Time: 06/26/18  8:02 AM   Result Value Ref Range    Sodium 139 136 - 145 mmol/L    Potassium 3.7 3.5 - 5.1 mmol/L    Chloride 106 95 - 110 mmol/L    CO2 20 (L) 23 - 29 mmol/L    Glucose 100 70 - 110 mg/dL    BUN, Bld 16 8 - 23 mg/dL    Creatinine 1.1 0.5 - 1.4 mg/dL    Calcium 9.4 8.7 - 10.5 mg/dL    Total Protein 7.5 6.0 - 8.4 g/dL    Albumin 4.0 3.5 - 5.2 g/dL    Total Bilirubin 1.6 (H) 0.1 - 1.0 mg/dL    Alkaline Phosphatase 100 55 - 135 U/L    AST 43 (H) 10 - 40 U/L    ALT 78 (H) 10 - 44 U/L    Anion Gap 13 8 - 16 mmol/L    eGFR if African American >60 >60 mL/min/1.73 m^2    eGFR if non African American >60 >60 mL/min/1.73 m^2   CBC auto  differential    Collection Time: 06/26/18  8:02 AM   Result Value Ref Range    WBC 5.83 3.90 - 12.70 K/uL    RBC 4.22 (L) 4.60 - 6.20 M/uL    Hemoglobin 13.3 (L) 14.0 - 18.0 g/dL    Hematocrit 38.2 (L) 40.0 - 54.0 %    MCV 91 82 - 98 fL    MCH 31.5 (H) 27.0 - 31.0 pg    MCHC 34.8 32.0 - 36.0 g/dL    RDW 14.3 11.5 - 14.5 %    Platelets 198 150 - 350 K/uL    MPV 11.0 9.2 - 12.9 fL    Gran # (ANC) 4.3 1.8 - 7.7 K/uL    Lymph # 0.6 (L) 1.0 - 4.8 K/uL    Mono # 0.8 0.3 - 1.0 K/uL    Eos # 0.1 0.0 - 0.5 K/uL    Baso # 0.02 0.00 - 0.20 K/uL    Gran% 74.0 (H) 38.0 - 73.0 %    Lymph% 10.6 (L) 18.0 - 48.0 %    Mono% 13.0 4.0 - 15.0 %    Eosinophil% 2.1 0.0 - 8.0 %    Basophil% 0.3 0.0 - 1.9 %    Differential Method Automated          Assessment:       1. Rheumatoid arthritis of multiple sites with negative rheumatoid factor    2. Chronic colitis    3. Psoriasis    4. Medication monitoring encounter    5. Immunocompromised patient    6. Primary osteoarthritis of both knees    7. Obesity, Class II, BMI 35-39.9, no comorbidity            1.  Seronegative rheumatoid arthritis -  Who has failed mtx monotheraoy, failed Enbrel, Remicade, Humira, cimzia- IV Orencia 1000 mg every 4 - now on Orencia 125 mg SC weekly injection with mtx 20 mg weekly to see if we could get better control of RA -still mod DA-  9 Tender/ 5 swollen - cdai 25, RENEE 1.5    2.  Skin psoriasis better on mtx and topical- no scalp rash today     3.  Chronic unspecified colitis  - not UC- Stable    4.  Osteoarthritis of both knees    5.  Vaccines up to date -had yearly flu vaccine    6.  Medication Monitoring- no current issues, no evidence of toxicity    7.  Immunocompromised no issues with recurrent infections    8.  Chronic pain on gabapentin 600 mg tid and cymbalta 60 mg/d    9. Nocturnal pain issues interfering with normal sleep pattern contributing to daytime sleepiness    10. Severe persistent diarrhea from xeljanz     Plan:         His lft are elevated  Will  skip mtx this week and consider resuming next week at lower dose of 15 mg (0.6 mL)  Retest his liver test in 1 week  If back to normal then will start actemra  May stay off mtx and only use actemra    If still elevated (>2xULN) then will go back to orencia    Always remember to Hold actemra  and mtx for signs of infection or for surgery   Discussed risk versus benefits and potential side effects of xeljanz. Medication Literature given to patient  If any abd pain occures and persits more than 1-2 days notify us immediately  He does not remember ever having diverticulitis just the colitis issues  Zoster vaccine done 2013    Nurse teaching for at home actemra administration    C/w gabapentin to 600 mg, keep morning and afternoon dose at  300 mg   Can go up to 900 mg at night if needed    Next visit consider increasing his Cymbalta to  60 mg bid but for now will c/w once daily     Repeat kaushik hand and foot X-rays in 1.5-2 years    rtc 5-6  weeks with labs (cbc,cmp, esr, crp)      call with any questions, changes, or concerns

## 2018-06-27 NOTE — PROGRESS NOTES
Patient instructed and demonstrated on how to use Actemra SQ prefilled injection . Patient states that he knows how to properly do the SQ injection stating that he has used Orencia which is very similar. Patient instructed that per Juanita Beckham PA-C to not start injection until after he do his Hepatic Function Panel on Tuesday and she will call him with results. True Mello voiced understanding.

## 2018-07-03 ENCOUNTER — LAB VISIT (OUTPATIENT)
Dept: LAB | Facility: HOSPITAL | Age: 68
End: 2018-07-03
Attending: PHYSICIAN ASSISTANT
Payer: MEDICARE

## 2018-07-03 DIAGNOSIS — R74.8 ELEVATED LIVER ENZYMES: ICD-10-CM

## 2018-07-03 LAB
ALBUMIN SERPL BCP-MCNC: 3.4 G/DL
ALP SERPL-CCNC: 107 U/L
ALT SERPL W/O P-5'-P-CCNC: 59 U/L
AST SERPL-CCNC: 39 U/L
BILIRUB DIRECT SERPL-MCNC: 0.3 MG/DL
BILIRUB SERPL-MCNC: 0.7 MG/DL
PROT SERPL-MCNC: 6.8 G/DL

## 2018-07-03 PROCEDURE — 80076 HEPATIC FUNCTION PANEL: CPT

## 2018-07-03 PROCEDURE — 36415 COLL VENOUS BLD VENIPUNCTURE: CPT | Mod: PO

## 2018-07-05 ENCOUNTER — TELEPHONE (OUTPATIENT)
Dept: RHEUMATOLOGY | Facility: CLINIC | Age: 68
End: 2018-07-05

## 2018-07-05 ENCOUNTER — PATIENT MESSAGE (OUTPATIENT)
Dept: RHEUMATOLOGY | Facility: CLINIC | Age: 68
End: 2018-07-05

## 2018-07-05 NOTE — TELEPHONE ENCOUNTER
Spoke with  Sarkar informed him that per Juanita Beckham PA-C labs will need to be done in 3 weeks. Mr. Sarkar voiced understanding Labs scheduled for 7/26/2018 at the [facility] location per patient request.

## 2018-07-05 NOTE — TELEPHONE ENCOUNTER
----- Message from Juanita Beckham PA-C sent at 7/5/2018  8:35 AM CDT -----  Please schedule cmp-lab in 3 weeks at [facility] of his choice    Juanita

## 2018-07-23 ENCOUNTER — PATIENT MESSAGE (OUTPATIENT)
Dept: RHEUMATOLOGY | Facility: CLINIC | Age: 68
End: 2018-07-23

## 2018-07-30 ENCOUNTER — LAB VISIT (OUTPATIENT)
Dept: LAB | Facility: HOSPITAL | Age: 68
End: 2018-07-30
Attending: PHYSICIAN ASSISTANT
Payer: MEDICARE

## 2018-07-30 DIAGNOSIS — M06.09 RHEUMATOID ARTHRITIS OF MULTIPLE SITES WITH NEGATIVE RHEUMATOID FACTOR: Chronic | ICD-10-CM

## 2018-07-30 LAB
ALBUMIN SERPL BCP-MCNC: 3.9 G/DL
ALP SERPL-CCNC: 98 U/L
ALT SERPL W/O P-5'-P-CCNC: 107 U/L
ANION GAP SERPL CALC-SCNC: 10 MMOL/L
AST SERPL-CCNC: 84 U/L
BASOPHILS # BLD AUTO: 0.03 K/UL
BASOPHILS NFR BLD: 0.6 %
BILIRUB SERPL-MCNC: 1.5 MG/DL
BUN SERPL-MCNC: 20 MG/DL
CALCIUM SERPL-MCNC: 9 MG/DL
CHLORIDE SERPL-SCNC: 106 MMOL/L
CO2 SERPL-SCNC: 23 MMOL/L
CREAT SERPL-MCNC: 1.1 MG/DL
CRP SERPL-MCNC: 0.3 MG/L
DIFFERENTIAL METHOD: ABNORMAL
EOSINOPHIL # BLD AUTO: 0.2 K/UL
EOSINOPHIL NFR BLD: 3.9 %
ERYTHROCYTE [DISTWIDTH] IN BLOOD BY AUTOMATED COUNT: 14.1 %
ERYTHROCYTE [SEDIMENTATION RATE] IN BLOOD BY WESTERGREN METHOD: 2 MM/HR
EST. GFR  (AFRICAN AMERICAN): >60 ML/MIN/1.73 M^2
EST. GFR  (NON AFRICAN AMERICAN): >60 ML/MIN/1.73 M^2
GLUCOSE SERPL-MCNC: 115 MG/DL
HCT VFR BLD AUTO: 41.7 %
HGB BLD-MCNC: 13.8 G/DL
LYMPHOCYTES # BLD AUTO: 2 K/UL
LYMPHOCYTES NFR BLD: 41.1 %
MCH RBC QN AUTO: 31.2 PG
MCHC RBC AUTO-ENTMCNC: 33.1 G/DL
MCV RBC AUTO: 94 FL
MONOCYTES # BLD AUTO: 0.6 K/UL
MONOCYTES NFR BLD: 11.8 %
NEUTROPHILS # BLD AUTO: 2.1 K/UL
NEUTROPHILS NFR BLD: 42.6 %
PLATELET # BLD AUTO: 133 K/UL
PMV BLD AUTO: 11.6 FL
POTASSIUM SERPL-SCNC: 4.5 MMOL/L
PROT SERPL-MCNC: 7.2 G/DL
RBC # BLD AUTO: 4.43 M/UL
SODIUM SERPL-SCNC: 139 MMOL/L
WBC # BLD AUTO: 4.82 K/UL

## 2018-07-30 PROCEDURE — 80053 COMPREHEN METABOLIC PANEL: CPT | Mod: PO

## 2018-07-30 PROCEDURE — 85651 RBC SED RATE NONAUTOMATED: CPT | Mod: PO

## 2018-07-30 PROCEDURE — 36415 COLL VENOUS BLD VENIPUNCTURE: CPT | Mod: PO

## 2018-07-30 PROCEDURE — 85025 COMPLETE CBC W/AUTO DIFF WBC: CPT | Mod: PO

## 2018-07-30 PROCEDURE — 86140 C-REACTIVE PROTEIN: CPT

## 2018-08-01 ENCOUNTER — OFFICE VISIT (OUTPATIENT)
Dept: RHEUMATOLOGY | Facility: CLINIC | Age: 68
End: 2018-08-01
Payer: MEDICARE

## 2018-08-01 VITALS
WEIGHT: 226.63 LBS | SYSTOLIC BLOOD PRESSURE: 136 MMHG | BODY MASS INDEX: 35.57 KG/M2 | HEART RATE: 63 BPM | HEIGHT: 67 IN | DIASTOLIC BLOOD PRESSURE: 75 MMHG

## 2018-08-01 DIAGNOSIS — Z51.81 MEDICATION MONITORING ENCOUNTER: Chronic | ICD-10-CM

## 2018-08-01 DIAGNOSIS — M06.09 RHEUMATOID ARTHRITIS OF MULTIPLE SITES WITH NEGATIVE RHEUMATOID FACTOR: Primary | Chronic | ICD-10-CM

## 2018-08-01 DIAGNOSIS — D84.9 IMMUNOCOMPROMISED PATIENT: Chronic | ICD-10-CM

## 2018-08-01 DIAGNOSIS — R74.8 ELEVATED LIVER ENZYMES: ICD-10-CM

## 2018-08-01 DIAGNOSIS — M17.0 PRIMARY OSTEOARTHRITIS OF BOTH KNEES: ICD-10-CM

## 2018-08-01 DIAGNOSIS — K52.9 CHRONIC COLITIS: Chronic | ICD-10-CM

## 2018-08-01 PROCEDURE — 99213 OFFICE O/P EST LOW 20 MIN: CPT | Mod: PBBFAC,PO | Performed by: PHYSICIAN ASSISTANT

## 2018-08-01 PROCEDURE — 99214 OFFICE O/P EST MOD 30 MIN: CPT | Mod: S$PBB,,, | Performed by: PHYSICIAN ASSISTANT

## 2018-08-01 PROCEDURE — 99999 PR PBB SHADOW E&M-EST. PATIENT-LVL III: CPT | Mod: PBBFAC,,, | Performed by: PHYSICIAN ASSISTANT

## 2018-08-01 ASSESSMENT — CLINICAL DISEASE ACTIVITY INDEX (CDAI)
SWOLLEN_JOINTS_COUNT: 3
PHYSICIAN_ASSESSMENT: 4
TENDER_JOINTS_COUNT: 11
TOTAL_SCORE: 23
PATIENT_ASSESSMENT: 5

## 2018-08-01 ASSESSMENT — ROUTINE ASSESSMENT OF PATIENT INDEX DATA (RAPID3): MDHAQ FUNCTION SCORE: 1.2

## 2018-08-01 NOTE — PROGRESS NOTES
Subjective:       Patient ID: Thomas Sarkar is a 67 y.o. male.    Chief Complaint: Rheumatoid Arthritis      Thomas is back today for rheumatology followup.     We have followed thomas for seronegative RA with psoriasis overlap and chronic unspecified Colitis. He is followed by GI. His gi issues have been very quiet. He has been treated and failed mtx monotherapy and sulfasalazine. He has failed multiple tnf agents (remicade, enbrel, humira and cimzia),was on IV Orecnica 1000 mg Q 4 weeks  over 1 year at last visit dr booth moved him to Orencia sub Q 125 mg weekly dose to see if this would help better not much change. We tried xeljanz 11 mg/d but he developed diarrhea. We stopped this and moved to actemra 162 mg/wk sub Q. initially wiht mtx but his liver test went up a little so we stopped mtx and just use actemra monotherapy 162 mg/wk. Has take 4 weeks thus far. Can tell a little difference in his overall pain but still has a lot of msk pain/stiffness and some swelling. Pain today 5/10     Chronic pain in his hands, hips, knees and shoulders. Chronic joint swelling.     He had sleep study done. The test reported pain at night interfering with his ability to enter into deep sleep. Recommends maximizing his meds and trying to better control his RA.       Orencia has worked the best of all the agents he has been on but we have never been able to achieve remission of his RA. Scalp psoriasis comes and goes, scalp pretty clear right now. He reports never having any issues with diverticulitis in the past.  He no longer takes any pain meds they did not help. Now on gabapentin and Cymbalta  daily to help some to reduce pain. No side effects.             Psoriasis   Associated symptoms include arthralgias, joint swelling and myalgias. Pertinent negatives include no abdominal pain, chest pain, chills, congestion, coughing, fatigue, fever, nausea, numbness, rash, vomiting or weakness.   Osteoarthritis   Associated  "symptoms include arthralgias, joint swelling and myalgias. Pertinent negatives include no abdominal pain, chest pain, chills, congestion, coughing, fatigue, fever, nausea, numbness, rash, vomiting or weakness.     Review of Systems   Constitutional: Negative.  Negative for chills, fatigue and fever.   HENT: Negative.  Negative for congestion, mouth sores and trouble swallowing.    Eyes: Negative.  Negative for photophobia, redness and visual disturbance.   Respiratory: Negative.  Negative for cough, shortness of breath and wheezing.    Cardiovascular: Negative.  Negative for chest pain and leg swelling.   Gastrointestinal: Negative.  Negative for abdominal distention, abdominal pain, anal bleeding, blood in stool, constipation, diarrhea, nausea, rectal pain and vomiting.   Genitourinary: Negative.  Negative for dysuria, flank pain, frequency and urgency.   Musculoskeletal: Positive for arthralgias, joint swelling and myalgias. Negative for back pain and gait problem.   Skin: Negative for color change, pallor and rash.   Neurological: Negative.  Negative for dizziness, weakness and numbness.         Objective:     /75   Pulse 63   Ht 5' 7" (1.702 m)   Wt 102.8 kg (226 lb 10.1 oz)   BMI 35.50 kg/m²      Physical Exam   Constitutional: He is oriented to person, place, and time and well-developed, well-nourished, and in no distress. No distress.   HENT:   Head: Normocephalic and atraumatic.   Right Ear: External ear normal.   Left Ear: External ear normal.   Mouth/Throat: No oropharyngeal exudate.   Eyes: Conjunctivae and EOM are normal. Pupils are equal, round, and reactive to light. No scleral icterus.   Neck: Neck supple. No thyromegaly present.   Cardiovascular: Normal rate, regular rhythm and normal heart sounds.    No murmur heard.  Pulmonary/Chest: Effort normal and breath sounds normal. No respiratory distress.   Abdominal: Soft. Bowel sounds are normal.       Right Side Rheumatological Exam "     Examination finds the elbow, knee, 1st PIP, 1st MCP, 4th MCP, 5th PIP and 5th MCP normal.    The patient is tender to palpation of the shoulder, wrist, 2nd PIP, 2nd MCP, 3rd PIP and 4th PIP    He has swelling of the wrist and 2nd PIP    Left Side Rheumatological Exam     Examination finds the elbow, knee, 1st PIP, 1st MCP, 2nd PIP, 4th MCP and 5th MCP normal.    The patient is tender to palpation of the shoulder, wrist, 2nd MCP, 3rd PIP and 3rd MCP.    He has swelling of the 2nd MCP      Lymphadenopathy:     He has no cervical adenopathy.   Neurological: He is alert and oriented to person, place, and time. No cranial nerve deficit. He exhibits normal muscle tone. Gait normal. Coordination normal.   Skin: Skin is warm and dry. No rash noted.          Musculoskeletal: He exhibits edema and tenderness.   Puffiness to several fingers and pip joints with tenderness noted on exam  Decreased range of motion pip joints                  Recent Results (from the past 1008 hour(s))   Sedimentation rate, manual    Collection Time: 06/26/18  8:02 AM   Result Value Ref Range    Sed Rate 22 (H) 0 - 10 mm/Hr   C-reactive protein    Collection Time: 06/26/18  8:02 AM   Result Value Ref Range    CRP 6.8 0.0 - 8.2 mg/L   Comprehensive metabolic panel    Collection Time: 06/26/18  8:02 AM   Result Value Ref Range    Sodium 139 136 - 145 mmol/L    Potassium 3.7 3.5 - 5.1 mmol/L    Chloride 106 95 - 110 mmol/L    CO2 20 (L) 23 - 29 mmol/L    Glucose 100 70 - 110 mg/dL    BUN, Bld 16 8 - 23 mg/dL    Creatinine 1.1 0.5 - 1.4 mg/dL    Calcium 9.4 8.7 - 10.5 mg/dL    Total Protein 7.5 6.0 - 8.4 g/dL    Albumin 4.0 3.5 - 5.2 g/dL    Total Bilirubin 1.6 (H) 0.1 - 1.0 mg/dL    Alkaline Phosphatase 100 55 - 135 U/L    AST 43 (H) 10 - 40 U/L    ALT 78 (H) 10 - 44 U/L    Anion Gap 13 8 - 16 mmol/L    eGFR if African American >60 >60 mL/min/1.73 m^2    eGFR if non African American >60 >60 mL/min/1.73 m^2   CBC auto differential    Collection  Time: 06/26/18  8:02 AM   Result Value Ref Range    WBC 5.83 3.90 - 12.70 K/uL    RBC 4.22 (L) 4.60 - 6.20 M/uL    Hemoglobin 13.3 (L) 14.0 - 18.0 g/dL    Hematocrit 38.2 (L) 40.0 - 54.0 %    MCV 91 82 - 98 fL    MCH 31.5 (H) 27.0 - 31.0 pg    MCHC 34.8 32.0 - 36.0 g/dL    RDW 14.3 11.5 - 14.5 %    Platelets 198 150 - 350 K/uL    MPV 11.0 9.2 - 12.9 fL    Gran # (ANC) 4.3 1.8 - 7.7 K/uL    Lymph # 0.6 (L) 1.0 - 4.8 K/uL    Mono # 0.8 0.3 - 1.0 K/uL    Eos # 0.1 0.0 - 0.5 K/uL    Baso # 0.02 0.00 - 0.20 K/uL    Gran% 74.0 (H) 38.0 - 73.0 %    Lymph% 10.6 (L) 18.0 - 48.0 %    Mono% 13.0 4.0 - 15.0 %    Eosinophil% 2.1 0.0 - 8.0 %    Basophil% 0.3 0.0 - 1.9 %    Differential Method Automated    Hepatic function panel    Collection Time: 07/03/18  7:41 AM   Result Value Ref Range    Total Protein 6.8 6.0 - 8.4 g/dL    Albumin 3.4 (L) 3.5 - 5.2 g/dL    Total Bilirubin 0.7 0.1 - 1.0 mg/dL    Bilirubin, Direct 0.3 0.1 - 0.3 mg/dL    AST 39 10 - 40 U/L    ALT 59 (H) 10 - 44 U/L    Alkaline Phosphatase 107 55 - 135 U/L   Sedimentation rate, manual    Collection Time: 07/30/18  8:11 AM   Result Value Ref Range    Sed Rate 2 0 - 10 mm/Hr   C-reactive protein    Collection Time: 07/30/18  8:11 AM   Result Value Ref Range    CRP 0.3 0.0 - 8.2 mg/L   Comprehensive metabolic panel    Collection Time: 07/30/18  8:11 AM   Result Value Ref Range    Sodium 139 136 - 145 mmol/L    Potassium 4.5 3.5 - 5.1 mmol/L    Chloride 106 95 - 110 mmol/L    CO2 23 23 - 29 mmol/L    Glucose 115 (H) 70 - 110 mg/dL    BUN, Bld 20 8 - 23 mg/dL    Creatinine 1.1 0.5 - 1.4 mg/dL    Calcium 9.0 8.7 - 10.5 mg/dL    Total Protein 7.2 6.0 - 8.4 g/dL    Albumin 3.9 3.5 - 5.2 g/dL    Total Bilirubin 1.5 (H) 0.1 - 1.0 mg/dL    Alkaline Phosphatase 98 55 - 135 U/L    AST 84 (H) 10 - 40 U/L     (H) 10 - 44 U/L    Anion Gap 10 8 - 16 mmol/L    eGFR if African American >60 >60 mL/min/1.73 m^2    eGFR if non African American >60 >60 mL/min/1.73 m^2   CBC  auto differential    Collection Time: 07/30/18  8:11 AM   Result Value Ref Range    WBC 4.82 3.90 - 12.70 K/uL    RBC 4.43 (L) 4.60 - 6.20 M/uL    Hemoglobin 13.8 (L) 14.0 - 18.0 g/dL    Hematocrit 41.7 40.0 - 54.0 %    MCV 94 82 - 98 fL    MCH 31.2 (H) 27.0 - 31.0 pg    MCHC 33.1 32.0 - 36.0 g/dL    RDW 14.1 11.5 - 14.5 %    Platelets 133 (L) 150 - 350 K/uL    MPV 11.6 9.2 - 12.9 fL    Gran # (ANC) 2.1 1.8 - 7.7 K/uL    Lymph # 2.0 1.0 - 4.8 K/uL    Mono # 0.6 0.3 - 1.0 K/uL    Eos # 0.2 0.0 - 0.5 K/uL    Baso # 0.03 0.00 - 0.20 K/uL    Gran% 42.6 38.0 - 73.0 %    Lymph% 41.1 18.0 - 48.0 %    Mono% 11.8 4.0 - 15.0 %    Eosinophil% 3.9 0.0 - 8.0 %    Basophil% 0.6 0.0 - 1.9 %    Differential Method Automated          Assessment:       1. Rheumatoid arthritis of multiple sites with negative rheumatoid factor    2. Primary osteoarthritis of both knees    3. Medication monitoring encounter    4. Immunocompromised patient    5. Chronic colitis    6. Elevated liver enzymes            1.  Seronegative rheumatoid arthritis -  Who has failed mtx monotheraoy, failed Enbrel, Remicade, Humira, cimzia- IV Orencia 1000 mg every 4,  Orencia 125 mg SC weekly injection, and xeljanz along with mtx 20 mg weekly    Now on acterma 162 mg/wk monotherapy  Esr down from 22 to 2  Liver test up AST 84,   Drop in PLt from 198 to 133    Mild improvement in overall pain but still ongoing activity  still mod DA-  11 Tender/ 3 swollen - cdai 23, RENEE 1.2        2.  Skin psoriasis better on mtx and topical- no scalp rash today     3.  Chronic unspecified colitis  - not UC- Stable    4.  Osteoarthritis of both knees    5.  Vaccines up to date -had yearly flu vaccine    6.  Medication Monitoring- no current issues, no evidence of toxicity    7.  Immunocompromised no issues with recurrent infections    8.  Chronic pain on gabapentin 600 mg tid and cymbalta 60 mg/d    9. Nocturnal pain issues interfering with normal sleep pattern contributing  to daytime sleepiness         Plan:           For now remain off mtx  Push actemra out to 162 mg/14 days and watch liver and plt count closely- next dose will be due 8/12/18  Will check labs the following week  cbc and cmp and see from there  If liver test go up more or plts drop <100 then will stop actemra    Not sure what our next option would be- Olumiant (baracitinib) possible- go back to Orencia    Avoid steroid with his gluacome    C/w gabapentin to 600 mg, keep morning and afternoon dose at  300 mg   Can go up to 900 mg at night if needed    Next visit consider increasing his Cymbalta to  60 mg bid but for now will c/w once daily     Repeat kaushik hand and foot X-rays in 1.5-2 years    rtc 5  weeks with labs (cbc,cmp, esr, crp)-2-3 days prior       call with any questions, changes, or concerns

## 2018-08-16 ENCOUNTER — LAB VISIT (OUTPATIENT)
Dept: LAB | Facility: HOSPITAL | Age: 68
End: 2018-08-16
Attending: PHYSICIAN ASSISTANT
Payer: MEDICARE

## 2018-08-16 ENCOUNTER — TELEPHONE (OUTPATIENT)
Dept: RHEUMATOLOGY | Facility: CLINIC | Age: 68
End: 2018-08-16

## 2018-08-16 DIAGNOSIS — M06.09 RHEUMATOID ARTHRITIS OF MULTIPLE SITES WITH NEGATIVE RHEUMATOID FACTOR: Chronic | ICD-10-CM

## 2018-08-16 LAB
ALBUMIN SERPL BCP-MCNC: 3.9 G/DL
ALP SERPL-CCNC: 74 U/L
ALT SERPL W/O P-5'-P-CCNC: 120 U/L
ANION GAP SERPL CALC-SCNC: 9 MMOL/L
AST SERPL-CCNC: 104 U/L
BASOPHILS # BLD AUTO: 0.03 K/UL
BASOPHILS NFR BLD: 0.6 %
BILIRUB SERPL-MCNC: 1.6 MG/DL
BUN SERPL-MCNC: 17 MG/DL
CALCIUM SERPL-MCNC: 9.3 MG/DL
CHLORIDE SERPL-SCNC: 108 MMOL/L
CO2 SERPL-SCNC: 27 MMOL/L
CREAT SERPL-MCNC: 1.1 MG/DL
DIFFERENTIAL METHOD: ABNORMAL
EOSINOPHIL # BLD AUTO: 0.3 K/UL
EOSINOPHIL NFR BLD: 4.9 %
ERYTHROCYTE [DISTWIDTH] IN BLOOD BY AUTOMATED COUNT: 13.8 %
EST. GFR  (AFRICAN AMERICAN): >60 ML/MIN/1.73 M^2
EST. GFR  (NON AFRICAN AMERICAN): >60 ML/MIN/1.73 M^2
GLUCOSE SERPL-MCNC: 127 MG/DL
HCT VFR BLD AUTO: 41.2 %
HGB BLD-MCNC: 13.8 G/DL
LYMPHOCYTES # BLD AUTO: 2.2 K/UL
LYMPHOCYTES NFR BLD: 44.2 %
MCH RBC QN AUTO: 31.2 PG
MCHC RBC AUTO-ENTMCNC: 33.5 G/DL
MCV RBC AUTO: 93 FL
MONOCYTES # BLD AUTO: 0.5 K/UL
MONOCYTES NFR BLD: 8.9 %
NEUTROPHILS # BLD AUTO: 2.1 K/UL
NEUTROPHILS NFR BLD: 41.4 %
PLATELET # BLD AUTO: 140 K/UL
PMV BLD AUTO: 11 FL
POTASSIUM SERPL-SCNC: 5 MMOL/L
PROT SERPL-MCNC: 7.1 G/DL
RBC # BLD AUTO: 4.43 M/UL
SODIUM SERPL-SCNC: 144 MMOL/L
WBC # BLD AUTO: 5.07 K/UL

## 2018-08-16 PROCEDURE — 80053 COMPREHEN METABOLIC PANEL: CPT | Mod: PO

## 2018-08-16 PROCEDURE — 36415 COLL VENOUS BLD VENIPUNCTURE: CPT | Mod: PO

## 2018-08-16 PROCEDURE — 85025 COMPLETE CBC W/AUTO DIFF WBC: CPT | Mod: PO

## 2018-08-16 NOTE — TELEPHONE ENCOUNTER
Liver test higher with stopping mtx and pushing kevzara  Dose to Q 14 day, last dose done 8/12/18    Called pt to notify alt/ast > 2 X ULN so   Will stop kevzara and continue to hold mtx  Repeat labs as scheduled 8/30/18 and follow up 9/5/18    We will most likely have to change to another agent for is RA    Pt verbalized understanding

## 2018-08-30 ENCOUNTER — LAB VISIT (OUTPATIENT)
Dept: LAB | Facility: HOSPITAL | Age: 68
End: 2018-08-30
Attending: PHYSICIAN ASSISTANT
Payer: MEDICARE

## 2018-08-30 DIAGNOSIS — M06.09 RHEUMATOID ARTHRITIS OF MULTIPLE SITES WITH NEGATIVE RHEUMATOID FACTOR: Chronic | ICD-10-CM

## 2018-08-30 LAB
ALBUMIN SERPL BCP-MCNC: 3.9 G/DL
ALP SERPL-CCNC: 86 U/L
ALT SERPL W/O P-5'-P-CCNC: 102 U/L
ANION GAP SERPL CALC-SCNC: 11 MMOL/L
AST SERPL-CCNC: 76 U/L
BASOPHILS # BLD AUTO: 0.04 K/UL
BASOPHILS NFR BLD: 0.9 %
BILIRUB SERPL-MCNC: 1.3 MG/DL
BUN SERPL-MCNC: 18 MG/DL
CALCIUM SERPL-MCNC: 9 MG/DL
CHLORIDE SERPL-SCNC: 106 MMOL/L
CO2 SERPL-SCNC: 24 MMOL/L
CREAT SERPL-MCNC: 1.1 MG/DL
CRP SERPL-MCNC: 0.6 MG/L
DIFFERENTIAL METHOD: ABNORMAL
EOSINOPHIL # BLD AUTO: 0.2 K/UL
EOSINOPHIL NFR BLD: 5.1 %
ERYTHROCYTE [DISTWIDTH] IN BLOOD BY AUTOMATED COUNT: 13.5 %
ERYTHROCYTE [SEDIMENTATION RATE] IN BLOOD BY WESTERGREN METHOD: 1 MM/HR
EST. GFR  (AFRICAN AMERICAN): >60 ML/MIN/1.73 M^2
EST. GFR  (NON AFRICAN AMERICAN): >60 ML/MIN/1.73 M^2
GLUCOSE SERPL-MCNC: 142 MG/DL
HCT VFR BLD AUTO: 41.6 %
HGB BLD-MCNC: 13.8 G/DL
LYMPHOCYTES # BLD AUTO: 1.8 K/UL
LYMPHOCYTES NFR BLD: 38.7 %
MCH RBC QN AUTO: 30.7 PG
MCHC RBC AUTO-ENTMCNC: 33.2 G/DL
MCV RBC AUTO: 92 FL
MONOCYTES # BLD AUTO: 0.4 K/UL
MONOCYTES NFR BLD: 8.2 %
NEUTROPHILS # BLD AUTO: 2.1 K/UL
NEUTROPHILS NFR BLD: 47.1 %
PLATELET # BLD AUTO: 138 K/UL
PMV BLD AUTO: 11.5 FL
POTASSIUM SERPL-SCNC: 4.6 MMOL/L
PROT SERPL-MCNC: 6.9 G/DL
RBC # BLD AUTO: 4.5 M/UL
SODIUM SERPL-SCNC: 141 MMOL/L
WBC # BLD AUTO: 4.52 K/UL

## 2018-08-30 PROCEDURE — 80053 COMPREHEN METABOLIC PANEL: CPT | Mod: PO

## 2018-08-30 PROCEDURE — 85025 COMPLETE CBC W/AUTO DIFF WBC: CPT | Mod: PO

## 2018-08-30 PROCEDURE — 86140 C-REACTIVE PROTEIN: CPT

## 2018-08-30 PROCEDURE — 36415 COLL VENOUS BLD VENIPUNCTURE: CPT | Mod: PO

## 2018-08-30 PROCEDURE — 85651 RBC SED RATE NONAUTOMATED: CPT | Mod: PO

## 2018-09-05 ENCOUNTER — OFFICE VISIT (OUTPATIENT)
Dept: RHEUMATOLOGY | Facility: CLINIC | Age: 68
End: 2018-09-05
Payer: MEDICARE

## 2018-09-05 VITALS
WEIGHT: 239.88 LBS | BODY MASS INDEX: 37.65 KG/M2 | SYSTOLIC BLOOD PRESSURE: 134 MMHG | HEIGHT: 67 IN | DIASTOLIC BLOOD PRESSURE: 72 MMHG | HEART RATE: 62 BPM

## 2018-09-05 DIAGNOSIS — L40.9 PSORIASIS: Chronic | ICD-10-CM

## 2018-09-05 DIAGNOSIS — M06.09 RHEUMATOID ARTHRITIS OF MULTIPLE SITES WITH NEGATIVE RHEUMATOID FACTOR: Primary | Chronic | ICD-10-CM

## 2018-09-05 DIAGNOSIS — Z51.81 MEDICATION MONITORING ENCOUNTER: Chronic | ICD-10-CM

## 2018-09-05 DIAGNOSIS — R74.8 ELEVATED LIVER ENZYMES: ICD-10-CM

## 2018-09-05 DIAGNOSIS — D84.9 IMMUNOCOMPROMISED PATIENT: Chronic | ICD-10-CM

## 2018-09-05 DIAGNOSIS — K52.9 CHRONIC COLITIS: Chronic | ICD-10-CM

## 2018-09-05 PROCEDURE — 99213 OFFICE O/P EST LOW 20 MIN: CPT | Mod: PBBFAC,PO | Performed by: PHYSICIAN ASSISTANT

## 2018-09-05 PROCEDURE — 99999 PR PBB SHADOW E&M-EST. PATIENT-LVL III: CPT | Mod: PBBFAC,,, | Performed by: PHYSICIAN ASSISTANT

## 2018-09-05 PROCEDURE — 99214 OFFICE O/P EST MOD 30 MIN: CPT | Mod: S$PBB,,, | Performed by: PHYSICIAN ASSISTANT

## 2018-09-05 RX ORDER — ABATACEPT 125 MG/ML
125 INJECTION, SOLUTION SUBCUTANEOUS WEEKLY
Qty: 4 ML | Refills: 6 | Status: SHIPPED | OUTPATIENT
Start: 2018-09-05 | End: 2019-04-23 | Stop reason: SDUPTHER

## 2018-09-05 RX ORDER — DOXEPIN HYDROCHLORIDE 25 MG/1
25 CAPSULE ORAL NIGHTLY
COMMUNITY
Start: 2018-08-20 | End: 2019-01-24

## 2018-09-17 ENCOUNTER — TELEPHONE (OUTPATIENT)
Dept: RADIOLOGY | Facility: HOSPITAL | Age: 68
End: 2018-09-17

## 2018-09-18 ENCOUNTER — HOSPITAL ENCOUNTER (OUTPATIENT)
Dept: RADIOLOGY | Facility: HOSPITAL | Age: 68
Discharge: HOME | End: 2018-09-18
Attending: PHYSICIAN ASSISTANT
Payer: MEDICARE

## 2018-09-18 DIAGNOSIS — M06.09 RHEUMATOID ARTHRITIS OF MULTIPLE SITES WITH NEGATIVE RHEUMATOID FACTOR: Chronic | ICD-10-CM

## 2018-09-18 DIAGNOSIS — R74.8 ELEVATED LIVER ENZYMES: ICD-10-CM

## 2018-09-18 PROCEDURE — 76705 ECHO EXAM OF ABDOMEN: CPT | Mod: TC,PO

## 2018-09-18 PROCEDURE — 76705 ECHO EXAM OF ABDOMEN: CPT | Mod: 26,,, | Performed by: RADIOLOGY

## 2018-09-19 ENCOUNTER — TELEPHONE (OUTPATIENT)
Dept: RHEUMATOLOGY | Facility: CLINIC | Age: 68
End: 2018-09-19

## 2018-09-19 DIAGNOSIS — M06.09 RHEUMATOID ARTHRITIS OF MULTIPLE SITES WITH NEGATIVE RHEUMATOID FACTOR: Chronic | ICD-10-CM

## 2018-09-19 DIAGNOSIS — K76.0 FATTY LIVER: ICD-10-CM

## 2018-09-19 DIAGNOSIS — R74.8 ELEVATED LIVER ENZYMES: Primary | ICD-10-CM

## 2018-09-19 NOTE — TELEPHONE ENCOUNTER
Left voice message to return call to clinic regarding his scheduled appointment to see Keara North PA-C which is 10/1/2018 @ 10am

## 2018-09-19 NOTE — TELEPHONE ENCOUNTER
Refer to GI want him to see Keara North PA-C, she works with the liver specialist dr salinas    Would like seen asap for fatty liver, elevated liver test  No sure if they would want to biopsy vs just watch    He is off all his RA meds and off cholesterol meds     Please schedule pt and let him know

## 2018-10-01 ENCOUNTER — LAB VISIT (OUTPATIENT)
Dept: LAB | Facility: HOSPITAL | Age: 68
End: 2018-10-01
Attending: NURSE PRACTITIONER
Payer: MEDICARE

## 2018-10-01 ENCOUNTER — PATIENT MESSAGE (OUTPATIENT)
Dept: GASTROENTEROLOGY | Facility: CLINIC | Age: 68
End: 2018-10-01

## 2018-10-01 ENCOUNTER — INITIAL CONSULT (OUTPATIENT)
Dept: GASTROENTEROLOGY | Facility: CLINIC | Age: 68
End: 2018-10-01
Payer: MEDICARE

## 2018-10-01 VITALS
DIASTOLIC BLOOD PRESSURE: 76 MMHG | HEART RATE: 72 BPM | HEIGHT: 67 IN | SYSTOLIC BLOOD PRESSURE: 136 MMHG | WEIGHT: 237 LBS | BODY MASS INDEX: 37.2 KG/M2

## 2018-10-01 DIAGNOSIS — K76.0 FATTY LIVER: ICD-10-CM

## 2018-10-01 DIAGNOSIS — E66.9 OBESITY, CLASS II, BMI 35-39.9, NO COMORBIDITY: ICD-10-CM

## 2018-10-01 DIAGNOSIS — R74.8 ELEVATED LIVER ENZYMES: ICD-10-CM

## 2018-10-01 DIAGNOSIS — K76.0 FATTY LIVER: Primary | ICD-10-CM

## 2018-10-01 LAB
ALBUMIN SERPL BCP-MCNC: 3.5 G/DL
ALP SERPL-CCNC: 102 U/L
ALT SERPL W/O P-5'-P-CCNC: 121 U/L
ANION GAP SERPL CALC-SCNC: 10 MMOL/L
AST SERPL-CCNC: 108 U/L
BASOPHILS # BLD AUTO: 0.05 K/UL
BASOPHILS NFR BLD: 0.9 %
BILIRUB SERPL-MCNC: 0.8 MG/DL
BUN SERPL-MCNC: 16 MG/DL
CALCIUM SERPL-MCNC: 9.6 MG/DL
CHLORIDE SERPL-SCNC: 105 MMOL/L
CO2 SERPL-SCNC: 26 MMOL/L
CREAT SERPL-MCNC: 1.1 MG/DL
DIFFERENTIAL METHOD: ABNORMAL
EOSINOPHIL # BLD AUTO: 0.2 K/UL
EOSINOPHIL NFR BLD: 2.8 %
ERYTHROCYTE [DISTWIDTH] IN BLOOD BY AUTOMATED COUNT: 12.6 %
EST. GFR  (AFRICAN AMERICAN): >60 ML/MIN/1.73 M^2
EST. GFR  (NON AFRICAN AMERICAN): >60 ML/MIN/1.73 M^2
FERRITIN SERPL-MCNC: 230 NG/ML
GGT SERPL-CCNC: 90 U/L
GLUCOSE SERPL-MCNC: 181 MG/DL
HCT VFR BLD AUTO: 43.1 %
HGB BLD-MCNC: 14 G/DL
IMM GRANULOCYTES # BLD AUTO: 0.04 K/UL
IMM GRANULOCYTES NFR BLD AUTO: 0.7 %
INR PPP: 1
IRON SERPL-MCNC: 79 UG/DL
LYMPHOCYTES # BLD AUTO: 1.7 K/UL
LYMPHOCYTES NFR BLD: 30.1 %
MCH RBC QN AUTO: 30.2 PG
MCHC RBC AUTO-ENTMCNC: 32.5 G/DL
MCV RBC AUTO: 93 FL
MONOCYTES # BLD AUTO: 0.6 K/UL
MONOCYTES NFR BLD: 10.2 %
NEUTROPHILS # BLD AUTO: 3.2 K/UL
NEUTROPHILS NFR BLD: 55.3 %
NRBC BLD-RTO: 0 /100 WBC
PLATELET # BLD AUTO: 208 K/UL
PMV BLD AUTO: 11.8 FL
POTASSIUM SERPL-SCNC: 4.5 MMOL/L
PROT SERPL-MCNC: 7.5 G/DL
PROTHROMBIN TIME: 10 SEC
RBC # BLD AUTO: 4.64 M/UL
SATURATED IRON: 17 %
SODIUM SERPL-SCNC: 141 MMOL/L
TOTAL IRON BINDING CAPACITY: 468 UG/DL
TRANSFERRIN SERPL-MCNC: 316 MG/DL
WBC # BLD AUTO: 5.69 K/UL

## 2018-10-01 PROCEDURE — 86256 FLUORESCENT ANTIBODY TITER: CPT | Mod: 91

## 2018-10-01 PROCEDURE — 36415 COLL VENOUS BLD VENIPUNCTURE: CPT | Mod: PO

## 2018-10-01 PROCEDURE — 87340 HEPATITIS B SURFACE AG IA: CPT

## 2018-10-01 PROCEDURE — 82977 ASSAY OF GGT: CPT

## 2018-10-01 PROCEDURE — 80053 COMPREHEN METABOLIC PANEL: CPT

## 2018-10-01 PROCEDURE — 86038 ANTINUCLEAR ANTIBODIES: CPT

## 2018-10-01 PROCEDURE — 86790 VIRUS ANTIBODY NOS: CPT

## 2018-10-01 PROCEDURE — 82728 ASSAY OF FERRITIN: CPT

## 2018-10-01 PROCEDURE — 99204 OFFICE O/P NEW MOD 45 MIN: CPT | Mod: S$PBB,,, | Performed by: NURSE PRACTITIONER

## 2018-10-01 PROCEDURE — 85610 PROTHROMBIN TIME: CPT | Mod: PO

## 2018-10-01 PROCEDURE — 86235 NUCLEAR ANTIGEN ANTIBODY: CPT

## 2018-10-01 PROCEDURE — 99213 OFFICE O/P EST LOW 20 MIN: CPT | Mod: PBBFAC,PO | Performed by: NURSE PRACTITIONER

## 2018-10-01 PROCEDURE — 83540 ASSAY OF IRON: CPT

## 2018-10-01 PROCEDURE — 86803 HEPATITIS C AB TEST: CPT

## 2018-10-01 PROCEDURE — 82390 ASSAY OF CERULOPLASMIN: CPT

## 2018-10-01 PROCEDURE — 99999 PR PBB SHADOW E&M-EST. PATIENT-LVL III: CPT | Mod: PBBFAC,,, | Performed by: NURSE PRACTITIONER

## 2018-10-01 PROCEDURE — 86706 HEP B SURFACE ANTIBODY: CPT

## 2018-10-01 PROCEDURE — 86704 HEP B CORE ANTIBODY TOTAL: CPT

## 2018-10-01 PROCEDURE — 85025 COMPLETE CBC W/AUTO DIFF WBC: CPT

## 2018-10-01 NOTE — PROGRESS NOTES
Clinic Consult:  [facility] Consultation Note    Reason for Consult:  The primary encounter diagnosis was Fatty liver. Diagnoses of Elevated liver enzymes and Obesity, Class II, BMI 35-39.9, no comorbidity were also pertinent to this visit.    PCP: Suzette Alvarez   [address] [place] [address]    HPI:  This is a 68 y.o. male here for evaluation of the above  Pt is here with his wife  He states that he was referred by his rheumatologist for further investifation of recent labs and imaging.   He denies any previous knowledge of fatty liver.   Chart review shows a mildly levated ALT back to 2016, however, over recent months there has been a continued increse of LFTs and bilirubin.   He states that he has been feeling overall well without any GI complaints.   He denies any IVDU.  No tattoos.  PMH includes HTN, HLD, RA, obesity  Recent US showed hepatomegaly with fatty infiltration      Review of Systems   Constitutional: Negative for chills, fever, malaise/fatigue and weight loss.   Respiratory: Negative for cough.    Cardiovascular: Negative for chest pain.   Gastrointestinal:        Per HPI   Musculoskeletal: Negative for myalgias.   Skin: Negative for itching and rash.   Neurological: Negative for headaches.   Psychiatric/Behavioral: The patient is not nervous/anxious.        Medical History:   Past Medical History:   Diagnosis Date    Acid reflux     Allergy     Arthritis     PSORIATIC    Cataract     CHF (congestive heart failure)     Chronic kidney disease     Dry eyes     Glaucoma     SECONDARY TO STEROIDS    Heart murmur     Hyperlipidemia     Myocardial infarction     Rheumatoid arthritis(714.0)     Scalp tenderness     FROM PSORIASIS       Surgical History:  Past Surgical History:   Procedure Laterality Date    ADENOIDECTOMY      CARDIAC CATHETERIZATION  2010    CATARACT EXTRACTION, BILATERAL  2012    EYE SURGERY      TONSILLECTOMY         Family History:   Family  "History   Problem Relation Age of Onset    Alcohol abuse Father     Cancer Father     Diabetes Mellitus Father     Hyperlipidemia Father     Heart disease Paternal Grandmother     Stroke Paternal Grandfather     Hyperlipidemia Paternal Grandfather     Heart disease Mother     Osteoarthritis Mother     Asthma Daughter        Social History:   Social History     Tobacco Use    Smoking status: Former Smoker     Types: Cigarettes     Last attempt to quit: 1970     Years since quittin.2    Smokeless tobacco: Never Used   Substance Use Topics    Alcohol use: No    Drug use: No       Allergies: Reviewed    Home Medications:   Current Outpatient Medications on File Prior to Visit   Medication Sig Dispense Refill    aspirin (ECOTRIN) 81 MG EC tablet Take 81 mg by mouth once daily.      doxepin (SINEQUAN) 25 MG capsule Take 25 mg by mouth nightly.      DULoxetine (CYMBALTA) 60 MG capsule Take 1 capsule (60 mg total) by mouth once daily. 90 capsule 1    ERGOCALCIFEROL, VITAMIN D2, (VITAMIN D ORAL) Take 2,000 Int'l Units by mouth. Vitamin D3 2000 units      fish oil-omega-3 fatty acids 300-1,000 mg capsule Take 1 g by mouth once daily.      gabapentin (NEURONTIN) 300 MG capsule Take 2 capsules (600 mg total) by mouth 3 (three) times daily. (Patient taking differently: Take 600 mg by mouth 3 (three) times daily. 1 tab in the morning; 1 in the afternoon and 2 at night) 540 capsule 3    methylcellulose, laxative, 500 mg Tab Take 500 mg by mouth once daily.      metoprolol succinate (TOPROL-XL) 50 MG 24 hr tablet 1 tablet.      multivitamin with minerals tablet Take 1 tablet by mouth once daily.      pantoprazole (PROTONIX) 40 MG tablet 40 mg once daily.      RESTASIS 0.05 % ophthalmic emulsion       syringe with needle (BD LUER-CECILIA SYRINGE) 3 mL 26 x 5/8" Syrg Inject 0.8 mLs into the skin every 7 days. 100 Syringe 0    TAMSULOSIN HCL (FLOMAX ORAL) Take by mouth.      CRESTOR 20 mg tablet 1 " "tablet.      ORENCIA CLICKJECT 125 mg/mL AtIn Inject 125 mg into the skin once a week. 4 mL 6    [DISCONTINUED] folic acid (FOLVITE) 1 MG tablet Take 1 tablet (1,000 mcg total) by mouth once daily. 90 tablet 3     No current facility-administered medications on file prior to visit.        Physical Exam:  Vital Signs:  /76   Pulse 72   Ht 5' 7" (1.702 m)   Wt 107.5 kg (236 lb 15.9 oz)   BMI 37.12 kg/m²   Body mass index is 37.12 kg/m².  Physical Exam   Constitutional: He is oriented to person, place, and time. He appears well-developed and well-nourished.   HENT:   Head: Normocephalic.   Eyes: No scleral icterus.   Neck: Normal range of motion.   Cardiovascular: Normal rate and regular rhythm.   Pulmonary/Chest: Effort normal and breath sounds normal.   Abdominal: Soft. Bowel sounds are normal. He exhibits no distension. There is no tenderness.   Musculoskeletal: Normal range of motion.   Neurological: He is alert and oriented to person, place, and time.   Skin: Skin is warm and dry.   Psychiatric: He has a normal mood and affect.   Vitals reviewed.      Labs: Pertinent labs reviewed.    CRC Screening: [facility],  WIll request records    Assessment:  1. Fatty liver    2. Elevated liver enzymes    3. Obesity, Class II, BMI 35-39.9, no comorbidity         Recommendations:  - Long discussion with pt and wife on diagnosis and prognosis of fatty liver disease.  - Will get additional labs to R/O other causes of the elevated LFTs  - Strongly encouraged weight loss through diet and exercise.   Fatty liver  -     Comprehensive metabolic panel; Future; Expected date: 10/01/2018  -     CBC auto differential; Future; Expected date: 10/01/2018  -     Protime-INR; Future; Expected date: 10/01/2018  -     Ferritin; Future; Expected date: 10/01/2018  -     Iron and TIBC; Future; Expected date: 10/01/2018  -     Hepatitis B surface antibody; Future; Expected date: 10/01/2018  -     Hepatitis B " surface antigen; Future; Expected date: 10/01/2018  -     Hepatitis B core antibody, total; Future; Expected date: 10/01/2018  -     Hepatitis C antibody; Future; Expected date: 10/01/2018  -     Alpha-1-antitrypsin; Future; Expected date: 10/01/2018  -     DALTON; Future; Expected date: 10/01/2018  -     Antimitochondrial antibody; Future; Expected date: 10/01/2018  -     Anti-smooth muscle antibody; Future; Expected date: 10/01/2018  -     Ceruloplasmin; Future; Expected date: 10/01/2018  -     Gamma GT; Future; Expected date: 10/01/2018  -     Hepatitis A antibody, IgG; Future; Expected date: 10/01/2018    Elevated liver enzymes  -     Comprehensive metabolic panel; Future; Expected date: 10/01/2018  -     CBC auto differential; Future; Expected date: 10/01/2018  -     Protime-INR; Future; Expected date: 10/01/2018  -     Ferritin; Future; Expected date: 10/01/2018  -     Iron and TIBC; Future; Expected date: 10/01/2018  -     Hepatitis B surface antibody; Future; Expected date: 10/01/2018  -     Hepatitis B surface antigen; Future; Expected date: 10/01/2018  -     Hepatitis B core antibody, total; Future; Expected date: 10/01/2018  -     Hepatitis C antibody; Future; Expected date: 10/01/2018  -     Alpha-1-antitrypsin; Future; Expected date: 10/01/2018  -     DALTON; Future; Expected date: 10/01/2018  -     Antimitochondrial antibody; Future; Expected date: 10/01/2018  -     Anti-smooth muscle antibody; Future; Expected date: 10/01/2018  -     Ceruloplasmin; Future; Expected date: 10/01/2018  -     Gamma GT; Future; Expected date: 10/01/2018  -     Hepatitis A antibody, IgG; Future; Expected date: 10/01/2018    Obesity, Class II, BMI 35-39.9, no comorbidity          Follow-up in about 3 months (around 1/1/2019).        Thank you so much for allowing me to participate in the care of SILVIA Ratliff III

## 2018-10-01 NOTE — LETTER
October 1, 2018      Juanita Beckham PA-C  [address] [place] [address]           [facility]  [address] [place] [address]  Phone: [phone]  Fax: [phone]          Patient: Thomas Sarkar   MR Number: [id number]   YOB: 1950   Date of Visit: 10/1/2018       Dear Juanita Beckham:    Thank you for referring Thomas Sarkar to me for evaluation. Attached you will find relevant portions of my assessment and plan of care.    If you have questions, please do not hesitate to call me. I look forward to following Thomas Sarkar along with you.    Sincerely,    [place], [facility]    Enclosure  CC:  No Recipients    If you would like to receive this communication electronically, please contact [email or web address] or [phone] to request more information on [employer] access.    For providers and/or their staff who would like to refer a patient to [facility], please contact us through our one-stop-shop provider referral line, [facility], at [phone].    If you feel you have received this communication in error or would no longer like to receive these types of communications, please e-mail [email or web address]

## 2018-10-02 DIAGNOSIS — K76.0 FATTY LIVER: Primary | ICD-10-CM

## 2018-10-02 LAB
A1AT SERPL-MCNC: 140 MG/DL
ANA SER QL IF: NORMAL
CERULOPLASMIN SERPL-MCNC: 26 MG/DL
HBV CORE AB SERPL QL IA: NEGATIVE
HBV SURFACE AB SER-ACNC: NEGATIVE M[IU]/ML
HBV SURFACE AG SERPL QL IA: NEGATIVE
HCV AB SERPL QL IA: NEGATIVE
HEPATITIS A ANTIBODY, IGG: POSITIVE
MITOCHONDRIA AB TITR SER IF: NORMAL {TITER}
SMOOTH MUSCLE AB TITR SER IF: NORMAL {TITER}

## 2018-10-08 ENCOUNTER — DOCUMENTATION ONLY (OUTPATIENT)
Dept: GASTROENTEROLOGY | Facility: CLINIC | Age: 68
End: 2018-10-08

## 2018-10-08 NOTE — PROGRESS NOTES
Records received and reviewed.  Colonoscopy 9/20/18   Three small polyps in the transverse colon and in the cecum, diverticulosis in the entire colon, external and internal hemorrhoids.   Pathology: tubular adenoma

## 2018-10-16 ENCOUNTER — PATIENT MESSAGE (OUTPATIENT)
Dept: RHEUMATOLOGY | Facility: CLINIC | Age: 68
End: 2018-10-16

## 2018-10-16 ENCOUNTER — LAB VISIT (OUTPATIENT)
Dept: LAB | Facility: HOSPITAL | Age: 68
End: 2018-10-16
Attending: PHYSICIAN ASSISTANT
Payer: MEDICARE

## 2018-10-16 DIAGNOSIS — M06.09 RHEUMATOID ARTHRITIS OF MULTIPLE SITES WITH NEGATIVE RHEUMATOID FACTOR: Chronic | ICD-10-CM

## 2018-10-16 LAB
ALBUMIN SERPL BCP-MCNC: 3.6 G/DL
ALP SERPL-CCNC: 89 U/L
ALT SERPL W/O P-5'-P-CCNC: 129 U/L
ANION GAP SERPL CALC-SCNC: 11 MMOL/L
AST SERPL-CCNC: 119 U/L
BASOPHILS # BLD AUTO: 0.02 K/UL
BASOPHILS NFR BLD: 0.5 %
BILIRUB SERPL-MCNC: 1.5 MG/DL
BUN SERPL-MCNC: 19 MG/DL
CALCIUM SERPL-MCNC: 9.6 MG/DL
CHLORIDE SERPL-SCNC: 104 MMOL/L
CO2 SERPL-SCNC: 25 MMOL/L
CREAT SERPL-MCNC: 1.1 MG/DL
CRP SERPL-MCNC: 5.6 MG/L
DIFFERENTIAL METHOD: NORMAL
EOSINOPHIL # BLD AUTO: 0.2 K/UL
EOSINOPHIL NFR BLD: 3.9 %
ERYTHROCYTE [DISTWIDTH] IN BLOOD BY AUTOMATED COUNT: 12.8 %
ERYTHROCYTE [SEDIMENTATION RATE] IN BLOOD BY WESTERGREN METHOD: 15 MM/HR
EST. GFR  (AFRICAN AMERICAN): >60 ML/MIN/1.73 M^2
EST. GFR  (NON AFRICAN AMERICAN): >60 ML/MIN/1.73 M^2
GLUCOSE SERPL-MCNC: 128 MG/DL
HCT VFR BLD AUTO: 41.6 %
HGB BLD-MCNC: 14.1 G/DL
LYMPHOCYTES # BLD AUTO: 1.6 K/UL
LYMPHOCYTES NFR BLD: 36.7 %
MCH RBC QN AUTO: 30.6 PG
MCHC RBC AUTO-ENTMCNC: 33.9 G/DL
MCV RBC AUTO: 90 FL
MONOCYTES # BLD AUTO: 0.5 K/UL
MONOCYTES NFR BLD: 12 %
NEUTROPHILS # BLD AUTO: 2.1 K/UL
NEUTROPHILS NFR BLD: 46.9 %
PLATELET # BLD AUTO: 171 K/UL
PMV BLD AUTO: 11.2 FL
POTASSIUM SERPL-SCNC: 4.5 MMOL/L
PROT SERPL-MCNC: 7.6 G/DL
RBC # BLD AUTO: 4.61 M/UL
SODIUM SERPL-SCNC: 140 MMOL/L
WBC # BLD AUTO: 4.41 K/UL

## 2018-10-16 PROCEDURE — 80053 COMPREHEN METABOLIC PANEL: CPT | Mod: PO

## 2018-10-16 PROCEDURE — 85025 COMPLETE CBC W/AUTO DIFF WBC: CPT | Mod: PO

## 2018-10-16 PROCEDURE — 85651 RBC SED RATE NONAUTOMATED: CPT | Mod: PO

## 2018-10-16 PROCEDURE — 86140 C-REACTIVE PROTEIN: CPT

## 2018-10-16 PROCEDURE — 36415 COLL VENOUS BLD VENIPUNCTURE: CPT | Mod: PO

## 2018-10-17 ENCOUNTER — PATIENT MESSAGE (OUTPATIENT)
Dept: RHEUMATOLOGY | Facility: CLINIC | Age: 68
End: 2018-10-17

## 2018-11-03 DIAGNOSIS — M47.816 SPONDYLOSIS OF LUMBAR REGION WITHOUT MYELOPATHY OR RADICULOPATHY: Chronic | ICD-10-CM

## 2018-11-03 DIAGNOSIS — M06.09 RHEUMATOID ARTHRITIS OF MULTIPLE SITES WITH NEGATIVE RHEUMATOID FACTOR: Chronic | ICD-10-CM

## 2018-11-03 DIAGNOSIS — G89.29 CHRONIC MUSCULOSKELETAL PAIN: ICD-10-CM

## 2018-11-03 DIAGNOSIS — M17.0 PRIMARY OSTEOARTHRITIS OF BOTH KNEES: ICD-10-CM

## 2018-11-03 DIAGNOSIS — M79.18 CHRONIC MUSCULOSKELETAL PAIN: ICD-10-CM

## 2018-11-05 RX ORDER — DULOXETIN HYDROCHLORIDE 60 MG/1
CAPSULE, DELAYED RELEASE ORAL
Qty: 90 CAPSULE | Refills: 1 | Status: SHIPPED | OUTPATIENT
Start: 2018-11-05 | End: 2019-03-09 | Stop reason: SDUPTHER

## 2018-11-08 ENCOUNTER — TELEPHONE (OUTPATIENT)
Dept: RHEUMATOLOGY | Facility: CLINIC | Age: 68
End: 2018-11-08

## 2018-11-08 ENCOUNTER — LAB VISIT (OUTPATIENT)
Dept: LAB | Facility: HOSPITAL | Age: 68
End: 2018-11-08
Attending: PHYSICIAN ASSISTANT
Payer: MEDICARE

## 2018-11-08 DIAGNOSIS — M06.09 RHEUMATOID ARTHRITIS OF MULTIPLE SITES WITH NEGATIVE RHEUMATOID FACTOR: Chronic | ICD-10-CM

## 2018-11-08 LAB
ALBUMIN SERPL BCP-MCNC: 3.6 G/DL
ALP SERPL-CCNC: 82 U/L
ALT SERPL W/O P-5'-P-CCNC: 60 U/L
ANION GAP SERPL CALC-SCNC: 11 MMOL/L
AST SERPL-CCNC: 52 U/L
BASOPHILS # BLD AUTO: 0.04 K/UL
BASOPHILS NFR BLD: 0.9 %
BILIRUB SERPL-MCNC: 1.1 MG/DL
BUN SERPL-MCNC: 18 MG/DL
CALCIUM SERPL-MCNC: 9.4 MG/DL
CHLORIDE SERPL-SCNC: 109 MMOL/L
CO2 SERPL-SCNC: 23 MMOL/L
CREAT SERPL-MCNC: 1.1 MG/DL
CRP SERPL-MCNC: 1.7 MG/L
DIFFERENTIAL METHOD: NORMAL
EOSINOPHIL # BLD AUTO: 0.1 K/UL
EOSINOPHIL NFR BLD: 2.8 %
ERYTHROCYTE [DISTWIDTH] IN BLOOD BY AUTOMATED COUNT: 13 %
ERYTHROCYTE [SEDIMENTATION RATE] IN BLOOD BY WESTERGREN METHOD: 11 MM/HR
EST. GFR  (AFRICAN AMERICAN): >60 ML/MIN/1.73 M^2
EST. GFR  (NON AFRICAN AMERICAN): >60 ML/MIN/1.73 M^2
GLUCOSE SERPL-MCNC: 72 MG/DL
HCT VFR BLD AUTO: 42.4 %
HGB BLD-MCNC: 14.1 G/DL
LYMPHOCYTES # BLD AUTO: 1.9 K/UL
LYMPHOCYTES NFR BLD: 44.2 %
MCH RBC QN AUTO: 30 PG
MCHC RBC AUTO-ENTMCNC: 33.3 G/DL
MCV RBC AUTO: 90 FL
MONOCYTES # BLD AUTO: 0.4 K/UL
MONOCYTES NFR BLD: 8.3 %
NEUTROPHILS # BLD AUTO: 1.9 K/UL
NEUTROPHILS NFR BLD: 43.8 %
PLATELET # BLD AUTO: 179 K/UL
PMV BLD AUTO: 11.6 FL
POTASSIUM SERPL-SCNC: 4.6 MMOL/L
PROT SERPL-MCNC: 7.3 G/DL
RBC # BLD AUTO: 4.7 M/UL
SODIUM SERPL-SCNC: 143 MMOL/L
WBC # BLD AUTO: 4.23 K/UL

## 2018-11-08 PROCEDURE — 36415 COLL VENOUS BLD VENIPUNCTURE: CPT | Mod: PO

## 2018-11-08 PROCEDURE — 80053 COMPREHEN METABOLIC PANEL: CPT | Mod: PO

## 2018-11-08 PROCEDURE — 85651 RBC SED RATE NONAUTOMATED: CPT | Mod: PO

## 2018-11-08 PROCEDURE — 86140 C-REACTIVE PROTEIN: CPT

## 2018-11-08 PROCEDURE — 85025 COMPLETE CBC W/AUTO DIFF WBC: CPT | Mod: PO

## 2018-11-13 ENCOUNTER — OFFICE VISIT (OUTPATIENT)
Dept: RHEUMATOLOGY | Facility: CLINIC | Age: 68
End: 2018-11-13
Payer: MEDICARE

## 2018-11-13 VITALS
SYSTOLIC BLOOD PRESSURE: 129 MMHG | HEIGHT: 67 IN | WEIGHT: 232.38 LBS | BODY MASS INDEX: 36.47 KG/M2 | HEART RATE: 60 BPM | DIASTOLIC BLOOD PRESSURE: 73 MMHG

## 2018-11-13 DIAGNOSIS — D84.9 IMMUNOCOMPROMISED PATIENT: Chronic | ICD-10-CM

## 2018-11-13 DIAGNOSIS — L40.9 PSORIASIS: Chronic | ICD-10-CM

## 2018-11-13 DIAGNOSIS — Z51.81 MEDICATION MONITORING ENCOUNTER: Chronic | ICD-10-CM

## 2018-11-13 DIAGNOSIS — R74.8 ELEVATED LIVER ENZYMES: ICD-10-CM

## 2018-11-13 DIAGNOSIS — M06.09 RHEUMATOID ARTHRITIS OF MULTIPLE SITES WITH NEGATIVE RHEUMATOID FACTOR: Primary | Chronic | ICD-10-CM

## 2018-11-13 PROCEDURE — 99214 OFFICE O/P EST MOD 30 MIN: CPT | Mod: PBBFAC,PO | Performed by: PHYSICIAN ASSISTANT

## 2018-11-13 PROCEDURE — 99214 OFFICE O/P EST MOD 30 MIN: CPT | Mod: S$PBB,,, | Performed by: PHYSICIAN ASSISTANT

## 2018-11-13 PROCEDURE — 99999 PR PBB SHADOW E&M-EST. PATIENT-LVL IV: CPT | Mod: PBBFAC,,, | Performed by: PHYSICIAN ASSISTANT

## 2018-11-13 RX ORDER — METFORMIN HYDROCHLORIDE 500 MG/1
TABLET, EXTENDED RELEASE ORAL
COMMUNITY
Start: 2018-10-08 | End: 2020-10-23 | Stop reason: SDUPTHER

## 2018-11-13 RX ORDER — CIPROFLOXACIN AND DEXAMETHASONE 3; 1 MG/ML; MG/ML
SUSPENSION/ DROPS AURICULAR (OTIC)
COMMUNITY
Start: 2018-08-21 | End: 2019-01-04

## 2018-11-13 NOTE — PROGRESS NOTES
Subjective:       Patient ID: Thomas Sarkar is a 68 y.o. male.    Chief Complaint: Rheumatoid Arthritis      Thomas is back today for rheumatology followup.     We have followed thomas for seronegative RA with psoriasis overlap and chronic unspecified Colitis. He is followed by GI. His gi issues have been very quiet. He has been treated and failed mtx monotherapy and sulfasalazine. He has failed multiple tnf agents (remicade, enbrel, humira and cimzia),was on IV Orecnica 1000 mg Q 4 weeks  over 1 year at last visit dr booth moved him to Orencia sub Q 125 mg weekly dose to see if this would help better not much change. We tried xeljanz 11 mg/d but he developed diarrhea. We stopped this and moved to actemra 162 mg/wk sub Q. initially with mtx but his liver test went up a little so we stopped mtx and just use actemra monotherapy 162 mg/wk. After 4 weeks of actemra his liver test increased more. We held actemra, continued to hold mtx and held crestor.     Sent him to GI for work up. Dx Fatty liver, pcp dx with insulin resistence. Now on metformin. Watching his diet. Avoiding reg meats and fried foods. Eating a lot more salads and veggies. His lft has slowly come down. We resumed his sub Q orenica after 12 weeks off med. He was miserable, prolonged stiffness in his hands and hips stanley. More hand pain and swelling. Since being back on he can tell a huge difference pain level down from 7 to 4/10 hands/hips and generalized.     Had flu vaccine done     He had sleep study done. The test reported pain at night interfering with his ability to enter into deep sleep. Recommends maximizing his meds and trying to better control his RA. We will have to keep orenica on board and try to do the best we can for now       Orencia has worked the best of all the agents he has been on but we have never been able to achieve remission of his RA. Scalp psoriasis comes and goes, scalp pretty clear right now. He no longer takes any  "pain meds they did not help. Now on gabapentin and Cymbalta  daily to help some to reduce pain. No side effects.             Psoriasis   Associated symptoms include arthralgias and myalgias. Pertinent negatives include no abdominal pain, chest pain, chills, congestion, coughing, fatigue, fever, joint swelling, nausea, numbness, rash, vomiting or weakness.   Osteoarthritis   Associated symptoms include arthralgias and myalgias. Pertinent negatives include no abdominal pain, chest pain, chills, congestion, coughing, fatigue, fever, joint swelling, nausea, numbness, rash, vomiting or weakness.     Review of Systems   Constitutional: Negative.  Negative for chills, fatigue and fever.   HENT: Negative.  Negative for congestion, mouth sores and trouble swallowing.    Eyes: Negative.  Negative for photophobia, redness and visual disturbance.   Respiratory: Negative.  Negative for cough, shortness of breath and wheezing.    Cardiovascular: Negative.  Negative for chest pain and leg swelling.   Gastrointestinal: Negative.  Negative for abdominal distention, abdominal pain, anal bleeding, blood in stool, constipation, diarrhea, nausea, rectal pain and vomiting.   Genitourinary: Negative.  Negative for dysuria, flank pain, frequency and urgency.   Musculoskeletal: Positive for arthralgias and myalgias. Negative for back pain, gait problem and joint swelling.   Skin: Negative for color change, pallor and rash.   Neurological: Negative.  Negative for dizziness, weakness and numbness.         Objective:     /73   Pulse 60   Ht 5' 7" (1.702 m)   Wt 105.4 kg (232 lb 5.8 oz)   BMI 36.39 kg/m²      Physical Exam   Constitutional: He is oriented to person, place, and time and well-developed, well-nourished, and in no distress. No distress.   HENT:   Head: Normocephalic and atraumatic.   Right Ear: External ear normal.   Left Ear: External ear normal.   Mouth/Throat: No oropharyngeal exudate.   Eyes: Conjunctivae and EOM are " normal. Pupils are equal, round, and reactive to light. No scleral icterus.   Neck: Neck supple. No thyromegaly present.   Cardiovascular: Normal rate, regular rhythm and normal heart sounds.    No murmur heard.  Pulmonary/Chest: Effort normal and breath sounds normal. No respiratory distress.   Abdominal: Soft. Bowel sounds are normal.       Right Side Rheumatological Exam     Examination finds the elbow, knee, 1st PIP, 1st MCP, 4th MCP, 5th PIP and 5th MCP normal.    The patient is tender to palpation of the shoulder, wrist, 2nd PIP, 2nd MCP, 3rd PIP and 4th PIP    He has swelling of the wrist and 2nd PIP    Left Side Rheumatological Exam     Examination finds the elbow, knee, 1st PIP, 1st MCP, 2nd PIP, 4th MCP and 5th MCP normal.    The patient is tender to palpation of the shoulder, wrist, 2nd MCP, 3rd PIP and 3rd MCP.    He has swelling of the 2nd MCP      Lymphadenopathy:     He has no cervical adenopathy.   Neurological: He is alert and oriented to person, place, and time. No cranial nerve deficit. He exhibits normal muscle tone. Gait normal. Coordination normal.   Skin: Skin is warm and dry. No rash noted.          Musculoskeletal: He exhibits edema and tenderness.   Puffiness to several fingers and pip joints with tenderness noted on exam  Decreased range of motion pip joints   Not as much tenderness noted  Swelling improved                  Recent Results (from the past 1008 hour(s))   Sedimentation rate, manual    Collection Time: 10/16/18  7:56 AM   Result Value Ref Range    Sed Rate 15 (H) 0 - 10 mm/Hr   C-reactive protein    Collection Time: 10/16/18  7:56 AM   Result Value Ref Range    CRP 5.6 0.0 - 8.2 mg/L   Comprehensive metabolic panel    Collection Time: 10/16/18  7:56 AM   Result Value Ref Range    Sodium 140 136 - 145 mmol/L    Potassium 4.5 3.5 - 5.1 mmol/L    Chloride 104 95 - 110 mmol/L    CO2 25 23 - 29 mmol/L    Glucose 128 (H) 70 - 110 mg/dL    BUN, Bld 19 8 - 23 mg/dL    Creatinine 1.1  0.5 - 1.4 mg/dL    Calcium 9.6 8.7 - 10.5 mg/dL    Total Protein 7.6 6.0 - 8.4 g/dL    Albumin 3.6 3.5 - 5.2 g/dL    Total Bilirubin 1.5 (H) 0.1 - 1.0 mg/dL    Alkaline Phosphatase 89 55 - 135 U/L     (H) 10 - 40 U/L     (H) 10 - 44 U/L    Anion Gap 11 8 - 16 mmol/L    eGFR if African American >60 >60 mL/min/1.73 m^2    eGFR if non African American >60 >60 mL/min/1.73 m^2   CBC auto differential    Collection Time: 10/16/18  7:56 AM   Result Value Ref Range    WBC 4.41 3.90 - 12.70 K/uL    RBC 4.61 4.60 - 6.20 M/uL    Hemoglobin 14.1 14.0 - 18.0 g/dL    Hematocrit 41.6 40.0 - 54.0 %    MCV 90 82 - 98 fL    MCH 30.6 27.0 - 31.0 pg    MCHC 33.9 32.0 - 36.0 g/dL    RDW 12.8 11.5 - 14.5 %    Platelets 171 150 - 350 K/uL    MPV 11.2 9.2 - 12.9 fL    Gran # (ANC) 2.1 1.8 - 7.7 K/uL    Lymph # 1.6 1.0 - 4.8 K/uL    Mono # 0.5 0.3 - 1.0 K/uL    Eos # 0.2 0.0 - 0.5 K/uL    Baso # 0.02 0.00 - 0.20 K/uL    Gran% 46.9 38.0 - 73.0 %    Lymph% 36.7 18.0 - 48.0 %    Mono% 12.0 4.0 - 15.0 %    Eosinophil% 3.9 0.0 - 8.0 %    Basophil% 0.5 0.0 - 1.9 %    Differential Method Automated    Sedimentation rate, manual    Collection Time: 11/08/18  8:20 AM   Result Value Ref Range    Sed Rate 11 (H) 0 - 10 mm/Hr   C-reactive protein    Collection Time: 11/08/18  8:20 AM   Result Value Ref Range    CRP 1.7 0.0 - 8.2 mg/L   Comprehensive metabolic panel    Collection Time: 11/08/18  8:20 AM   Result Value Ref Range    Sodium 143 136 - 145 mmol/L    Potassium 4.6 3.5 - 5.1 mmol/L    Chloride 109 95 - 110 mmol/L    CO2 23 23 - 29 mmol/L    Glucose 72 70 - 110 mg/dL    BUN, Bld 18 8 - 23 mg/dL    Creatinine 1.1 0.5 - 1.4 mg/dL    Calcium 9.4 8.7 - 10.5 mg/dL    Total Protein 7.3 6.0 - 8.4 g/dL    Albumin 3.6 3.5 - 5.2 g/dL    Total Bilirubin 1.1 (H) 0.1 - 1.0 mg/dL    Alkaline Phosphatase 82 55 - 135 U/L    AST 52 (H) 10 - 40 U/L    ALT 60 (H) 10 - 44 U/L    Anion Gap 11 8 - 16 mmol/L    eGFR if African American >60 >60  mL/min/1.73 m^2    eGFR if non African American >60 >60 mL/min/1.73 m^2   CBC auto differential    Collection Time: 11/08/18  8:20 AM   Result Value Ref Range    WBC 4.23 3.90 - 12.70 K/uL    RBC 4.70 4.60 - 6.20 M/uL    Hemoglobin 14.1 14.0 - 18.0 g/dL    Hematocrit 42.4 40.0 - 54.0 %    MCV 90 82 - 98 fL    MCH 30.0 27.0 - 31.0 pg    MCHC 33.3 32.0 - 36.0 g/dL    RDW 13.0 11.5 - 14.5 %    Platelets 179 150 - 350 K/uL    MPV 11.6 9.2 - 12.9 fL    Gran # (ANC) 1.9 1.8 - 7.7 K/uL    Lymph # 1.9 1.0 - 4.8 K/uL    Mono # 0.4 0.3 - 1.0 K/uL    Eos # 0.1 0.0 - 0.5 K/uL    Baso # 0.04 0.00 - 0.20 K/uL    Gran% 43.8 38.0 - 73.0 %    Lymph% 44.2 18.0 - 48.0 %    Mono% 8.3 4.0 - 15.0 %    Eosinophil% 2.8 0.0 - 8.0 %    Basophil% 0.9 0.0 - 1.9 %    Differential Method Automated      Results for TRI WILL (MRN [id number]) as of 9/5/2018 08:28   Ref. Range 5/10/2018 12:37 6/26/2018 08:02 7/3/2018 07:41 7/30/2018 08:11 8/16/2018 07:32 8/30/2018 07:55   AST Latest Ref Range: 10 - 40 U/L 25 43 (H) 39 84 (H) 104 (H) 76 (H)   ALT Latest Ref Range: 10 - 44 U/L 31 78 (H) 59 (H) 107 (H) 120 (H) 102 (H)   CRP Latest Ref Range: 0.0 - 8.2 mg/L 2.2 6.8  0.3  0.6       Assessment:       1. Rheumatoid arthritis of multiple sites with negative rheumatoid factor    2. Elevated liver enzymes    3. Medication monitoring encounter    4. Immunocompromised patient    5. Psoriasis            1.  Seronegative rheumatoid arthritis -  Now resuming Orenica monotherapy- mod  improvement in overall pain but still ongoing activity  still mod DA-  6 Tender/ 3 swollen - cdai 14, RENEE 0.9        Had elevated lft with actemra/mtx/crestor- much better off all these agents   GI work up fatty liver- GI will follow    Will remain off mtx and crestor for now  Watch diet and loose weight  Follow lft closely     Not plt count dropped from 170 to 138 now back to 170 off actemra       Who has failed mtx monotheraoy, failed Enbrel, Remicade, Humira, cimzia- IV Orencia  1000 mg every 4,  Orencia 125 mg SC weekly injection, and xeljanz along with mtx 20 mg weekly      2.  Skin psoriasis stable - has prn topical- no scalp rash today     3.  Chronic unspecified colitis  - not UC- Stable    4.  Osteoarthritis of both knees    5.  Vaccines up to date -had yearly flu vaccine    6.  Medication Monitoring- no current issues, no evidence of toxicity    7.  Immunocompromised no issues with recurrent infections    8.  Chronic pain on gabapentin 600 mg tid and cymbalta 60 mg/d    9. Nocturnal pain issues interfering with normal sleep pattern contributing to daytime sleepiness         Plan:         C/w Orencia 125 mg weekly sc injection    Remain off mtx for now  Remain off crestor for now    Watch diet   Loose weight    Next year if lft stabilized then consider adding back low dose crestor with cards  Will keep ra trt monotherapy     Avoid steroid with his gluacoma    C/w gabapentin to 600 mg, keep morning and afternoon dose at  300 mg   Can go up to 900 mg at night if needed    C/w  Cymbalta to  60 mg once daily     Repeat kaushik hand and foot X-rays in 1.5-2 years    rtc 9-10 weeks with labs (cbc,cmp, esr, crp)-1 wk prior       call with any questions, changes, or concerns

## 2018-11-23 ENCOUNTER — TELEPHONE (OUTPATIENT)
Dept: RHEUMATOLOGY | Facility: CLINIC | Age: 68
End: 2018-11-23

## 2018-11-23 NOTE — TELEPHONE ENCOUNTER
----- Message from Nieves Ruiz sent at 11/23/2018 12:29 PM CST -----   [facility] at [phone]//ref # [id number]//is needing a prior authorization for the Orencia Auto injector//is sending a fax over to your office//[place]

## 2018-11-26 ENCOUNTER — PATIENT MESSAGE (OUTPATIENT)
Dept: RHEUMATOLOGY | Facility: CLINIC | Age: 68
End: 2018-11-26

## 2018-11-29 ENCOUNTER — DOCUMENTATION ONLY (OUTPATIENT)
Dept: RHEUMATOLOGY | Facility: CLINIC | Age: 68
End: 2018-11-29

## 2019-01-03 ENCOUNTER — LAB VISIT (OUTPATIENT)
Dept: LAB | Facility: HOSPITAL | Age: 69
End: 2019-01-03
Attending: FAMILY MEDICINE
Payer: MEDICARE

## 2019-01-03 DIAGNOSIS — M06.09 RHEUMATOID ARTHRITIS OF MULTIPLE SITES WITH NEGATIVE RHEUMATOID FACTOR: Chronic | ICD-10-CM

## 2019-01-03 LAB
ALBUMIN SERPL BCP-MCNC: 3.7 G/DL
ALP SERPL-CCNC: 98 U/L
ALT SERPL W/O P-5'-P-CCNC: 39 U/L
ANION GAP SERPL CALC-SCNC: 8 MMOL/L
AST SERPL-CCNC: 35 U/L
BASOPHILS # BLD AUTO: 0.02 K/UL
BASOPHILS NFR BLD: 0.5 %
BILIRUB SERPL-MCNC: 1 MG/DL
BUN SERPL-MCNC: 17 MG/DL
CALCIUM SERPL-MCNC: 10 MG/DL
CHLORIDE SERPL-SCNC: 104 MMOL/L
CO2 SERPL-SCNC: 28 MMOL/L
CREAT SERPL-MCNC: 1.1 MG/DL
CRP SERPL-MCNC: 1.4 MG/L
DIFFERENTIAL METHOD: NORMAL
EOSINOPHIL # BLD AUTO: 0.1 K/UL
EOSINOPHIL NFR BLD: 2.1 %
ERYTHROCYTE [DISTWIDTH] IN BLOOD BY AUTOMATED COUNT: 13.1 %
ERYTHROCYTE [SEDIMENTATION RATE] IN BLOOD BY WESTERGREN METHOD: 5 MM/HR
EST. GFR  (AFRICAN AMERICAN): >60 ML/MIN/1.73 M^2
EST. GFR  (NON AFRICAN AMERICAN): >60 ML/MIN/1.73 M^2
GLUCOSE SERPL-MCNC: 128 MG/DL
HCT VFR BLD AUTO: 44.2 %
HGB BLD-MCNC: 14.6 G/DL
LYMPHOCYTES # BLD AUTO: 1.5 K/UL
LYMPHOCYTES NFR BLD: 35.9 %
MCH RBC QN AUTO: 30 PG
MCHC RBC AUTO-ENTMCNC: 33 G/DL
MCV RBC AUTO: 91 FL
MONOCYTES # BLD AUTO: 0.5 K/UL
MONOCYTES NFR BLD: 10.7 %
NEUTROPHILS # BLD AUTO: 2.2 K/UL
NEUTROPHILS NFR BLD: 50.8 %
PLATELET # BLD AUTO: 180 K/UL
PMV BLD AUTO: 11.3 FL
POTASSIUM SERPL-SCNC: 4.9 MMOL/L
PROT SERPL-MCNC: 7.5 G/DL
RBC # BLD AUTO: 4.86 M/UL
SODIUM SERPL-SCNC: 140 MMOL/L
WBC # BLD AUTO: 4.29 K/UL

## 2019-01-03 PROCEDURE — 36415 COLL VENOUS BLD VENIPUNCTURE: CPT | Mod: PO

## 2019-01-03 PROCEDURE — 85025 COMPLETE CBC W/AUTO DIFF WBC: CPT | Mod: PO

## 2019-01-03 PROCEDURE — 80053 COMPREHEN METABOLIC PANEL: CPT | Mod: PO

## 2019-01-03 PROCEDURE — 85651 RBC SED RATE NONAUTOMATED: CPT | Mod: PO

## 2019-01-03 PROCEDURE — 86140 C-REACTIVE PROTEIN: CPT

## 2019-01-04 ENCOUNTER — OFFICE VISIT (OUTPATIENT)
Dept: GASTROENTEROLOGY | Facility: CLINIC | Age: 69
End: 2019-01-04
Payer: MEDICARE

## 2019-01-04 VITALS
DIASTOLIC BLOOD PRESSURE: 78 MMHG | WEIGHT: 226 LBS | HEIGHT: 67 IN | BODY MASS INDEX: 35.47 KG/M2 | HEART RATE: 65 BPM | SYSTOLIC BLOOD PRESSURE: 120 MMHG

## 2019-01-04 DIAGNOSIS — K76.0 FATTY LIVER: Primary | ICD-10-CM

## 2019-01-04 DIAGNOSIS — R16.0 HEPATOMEGALY: ICD-10-CM

## 2019-01-04 PROCEDURE — 99214 OFFICE O/P EST MOD 30 MIN: CPT | Mod: S$PBB,,, | Performed by: NURSE PRACTITIONER

## 2019-01-04 PROCEDURE — 99214 OFFICE O/P EST MOD 30 MIN: CPT | Mod: PBBFAC,PO | Performed by: NURSE PRACTITIONER

## 2019-01-04 PROCEDURE — 99214 PR OFFICE/OUTPT VISIT, EST, LEVL IV, 30-39 MIN: ICD-10-PCS | Mod: S$PBB,,, | Performed by: NURSE PRACTITIONER

## 2019-01-04 PROCEDURE — 99999 PR PBB SHADOW E&M-EST. PATIENT-LVL IV: CPT | Mod: PBBFAC,,, | Performed by: NURSE PRACTITIONER

## 2019-01-04 PROCEDURE — 99999 PR PBB SHADOW E&M-EST. PATIENT-LVL IV: ICD-10-PCS | Mod: PBBFAC,,, | Performed by: NURSE PRACTITIONER

## 2019-01-04 NOTE — PROGRESS NOTES
Clinic Follow Up:  [facility] Follow Up Note    Reason for Follow Up:  The primary encounter diagnosis was Fatty liver. A diagnosis of Hepatomegaly was also pertinent to this visit.    PCP: Suzette Alvarez       HPI:  This is a 68 y.o. male here for follow up of the above  Pt states that since his last visit, he has been feeling well without any complaints  Per chart review, he has had a weight loss of 10 pounds.   Denies any abdominal pain.  No nausea or vomiting.  No change in bowel pattern.  No melena or hematochezia. No weight loss.  No upper GI bleeding.  No ascites or BLE.  No overt confusion.  Labs show improved LFTs      Review of Systems   Constitutional: Positive for weight loss (purposeful). Negative for chills, fever and malaise/fatigue.   Respiratory: Negative for cough.    Cardiovascular: Negative for chest pain.   Gastrointestinal:        Per HPI   Musculoskeletal: Negative for myalgias.   Skin: Negative for itching and rash.   Neurological: Negative for headaches.   Psychiatric/Behavioral: The patient is not nervous/anxious.        Medical History:  Past Medical History:   Diagnosis Date    Acid reflux     Allergy     Arthritis     PSORIATIC    Cataract     CHF (congestive heart failure)     Chronic kidney disease     Dry eyes     Glaucoma     SECONDARY TO STEROIDS    Heart murmur     Hyperlipidemia     Myocardial infarction     Rheumatoid arthritis(714.0)     Scalp tenderness     FROM PSORIASIS       Surgical History:   Past Surgical History:   Procedure Laterality Date    ADENOIDECTOMY      CARDIAC CATHETERIZATION  2010    CATARACT EXTRACTION, BILATERAL  2012    EYE SURGERY      TONSILLECTOMY         Family History:   Family History   Problem Relation Age of Onset    Alcohol abuse Father     Cancer Father     Diabetes Mellitus Father     Hyperlipidemia Father     Heart disease Paternal Grandmother     Stroke Paternal Grandfather     Hyperlipidemia  "Paternal Grandfather     Heart disease Mother     Osteoarthritis Mother     Asthma Daughter        Social History:   Social History     Tobacco Use    Smoking status: Former Smoker     Types: Cigarettes     Last attempt to quit: 1970     Years since quittin.5    Smokeless tobacco: Never Used   Substance Use Topics    Alcohol use: No    Drug use: No       Allergies: Reviewed    Home Medications:  Current Outpatient Medications on File Prior to Visit   Medication Sig Dispense Refill    aspirin (ECOTRIN) 81 MG EC tablet Take 81 mg by mouth once daily.      doxepin (SINEQUAN) 25 MG capsule Take 25 mg by mouth nightly.      DULoxetine (CYMBALTA) 60 MG capsule TAKE 1 CAPSULE DAILY 90 capsule 1    fish oil-omega-3 fatty acids 300-1,000 mg capsule Take 1 g by mouth once daily.      gabapentin (NEURONTIN) 300 MG capsule Take 2 capsules (600 mg total) by mouth 3 (three) times daily. (Patient taking differently: Take 600 mg by mouth 3 (three) times daily. 1 tab in the morning; 1 in the afternoon and 2 at night) 540 capsule 3    metFORMIN (GLUCOPHAGE-XR) 500 MG 24 hr tablet       metoprolol succinate (TOPROL-XL) 50 MG 24 hr tablet 1 tablet.      multivitamin capsule Take 1 capsule by mouth.      multivitamin with minerals tablet Take 1 tablet by mouth once daily.      ORENCIA CLICKJECT 125 mg/mL AtIn Inject 125 mg into the skin once a week. 4 mL 6    pantoprazole (PROTONIX) 40 MG tablet 40 mg once daily.      RESTASIS 0.05 % ophthalmic emulsion       syringe with needle (BD LUER-CECILIA SYRINGE) 3 mL 26 x 5/8" Syrg Inject 0.8 mLs into the skin every 7 days. 100 Syringe 0    TAMSULOSIN HCL (FLOMAX ORAL) Take by mouth.      CRESTOR 20 mg tablet 1 tablet.      [DISCONTINUED] CIPRODEX 0.3-0.1 % DrpS        No current facility-administered medications on file prior to visit.        Physical Exam:  Vital Signs:  /78   Pulse 65   Ht 5' 7" (1.702 m)   Wt 102.5 kg (225 lb 15.5 oz)   BMI 35.39 kg/m²  "  Body mass index is 35.39 kg/m².  Physical Exam   Constitutional: He is oriented to person, place, and time. He appears well-developed.   HENT:   Head: Normocephalic.   Neck: Normal range of motion.   Cardiovascular: Normal rate and regular rhythm.   Pulmonary/Chest: Effort normal and breath sounds normal.   Abdominal: Soft. Bowel sounds are normal. He exhibits no distension. There is no tenderness.   Musculoskeletal: Normal range of motion.   Neurological: He is alert and oriented to person, place, and time.   Skin: Skin is warm and dry.   Psychiatric: He has a normal mood and affect.   Vitals reviewed.      Labs: Pertinent labs reviewed.    Assessment:  1. Fatty liver    2. Hepatomegaly        Recommendations:  - stable without any new complaints  - Will plan for repeat labs and F/U in 6 months  - Will plan for fibroscan for staging of liver.  Fatty liver  -     CBC auto differential; Future; Expected date: 01/04/2019  -     Comprehensive metabolic panel; Future; Expected date: 01/04/2019  -     Protime-INR; Future; Expected date: 01/04/2019  -     US Elastography Liver; Future; Expected date: 01/04/2019    Hepatomegaly  -     CBC auto differential; Future; Expected date: 01/04/2019  -     Comprehensive metabolic panel; Future; Expected date: 01/04/2019  -     Protime-INR; Future; Expected date: 01/04/2019  -     US Elastography Liver; Future; Expected date: 01/04/2019          Return to Clinic:    Follow-up in about 6 months (around 7/4/2019).

## 2019-01-24 ENCOUNTER — PROCEDURE VISIT (OUTPATIENT)
Dept: GASTROENTEROLOGY | Facility: CLINIC | Age: 69
End: 2019-01-24
Attending: NURSE PRACTITIONER
Payer: MEDICARE

## 2019-01-24 ENCOUNTER — OFFICE VISIT (OUTPATIENT)
Dept: RHEUMATOLOGY | Facility: CLINIC | Age: 69
End: 2019-01-24
Payer: MEDICARE

## 2019-01-24 VITALS
SYSTOLIC BLOOD PRESSURE: 129 MMHG | HEIGHT: 67 IN | DIASTOLIC BLOOD PRESSURE: 71 MMHG | BODY MASS INDEX: 35.02 KG/M2 | WEIGHT: 223.13 LBS | HEART RATE: 57 BPM

## 2019-01-24 VITALS
HEIGHT: 67 IN | DIASTOLIC BLOOD PRESSURE: 76 MMHG | HEART RATE: 60 BPM | WEIGHT: 224.19 LBS | BODY MASS INDEX: 35.19 KG/M2 | SYSTOLIC BLOOD PRESSURE: 130 MMHG

## 2019-01-24 DIAGNOSIS — R74.8 ELEVATED LIVER ENZYMES: ICD-10-CM

## 2019-01-24 DIAGNOSIS — M06.09 RHEUMATOID ARTHRITIS OF MULTIPLE SITES WITH NEGATIVE RHEUMATOID FACTOR: Primary | Chronic | ICD-10-CM

## 2019-01-24 DIAGNOSIS — D84.9 IMMUNOCOMPROMISED PATIENT: Chronic | ICD-10-CM

## 2019-01-24 DIAGNOSIS — M17.0 PRIMARY OSTEOARTHRITIS OF BOTH KNEES: ICD-10-CM

## 2019-01-24 DIAGNOSIS — R16.0 HEPATOMEGALY: ICD-10-CM

## 2019-01-24 DIAGNOSIS — Z51.81 MEDICATION MONITORING ENCOUNTER: Chronic | ICD-10-CM

## 2019-01-24 DIAGNOSIS — K76.0 FATTY LIVER: ICD-10-CM

## 2019-01-24 PROCEDURE — 99999 PR PBB SHADOW E&M-EST. PATIENT-LVL IV: CPT | Mod: PBBFAC,,, | Performed by: PHYSICIAN ASSISTANT

## 2019-01-24 PROCEDURE — 99214 PR OFFICE/OUTPT VISIT, EST, LEVL IV, 30-39 MIN: ICD-10-PCS | Mod: S$PBB,,, | Performed by: PHYSICIAN ASSISTANT

## 2019-01-24 PROCEDURE — 91200 LIVER ELASTOGRAPHY: CPT | Mod: 26,S$PBB,, | Performed by: NURSE PRACTITIONER

## 2019-01-24 PROCEDURE — 99214 OFFICE O/P EST MOD 30 MIN: CPT | Mod: PBBFAC,PN | Performed by: PHYSICIAN ASSISTANT

## 2019-01-24 PROCEDURE — 99214 OFFICE O/P EST MOD 30 MIN: CPT | Mod: S$PBB,,, | Performed by: PHYSICIAN ASSISTANT

## 2019-01-24 PROCEDURE — 96372 THER/PROPH/DIAG INJ SC/IM: CPT | Mod: PBBFAC,PN

## 2019-01-24 PROCEDURE — 91200 PR LIVER ELASTOGRAPHY W/OUT IMAG W/INTERP & REPORT: ICD-10-PCS | Mod: 26,S$PBB,, | Performed by: NURSE PRACTITIONER

## 2019-01-24 PROCEDURE — 99999 PR PBB SHADOW E&M-EST. PATIENT-LVL IV: ICD-10-PCS | Mod: PBBFAC,,, | Performed by: PHYSICIAN ASSISTANT

## 2019-01-24 PROCEDURE — 91200 LIVER ELASTOGRAPHY: CPT | Mod: PBBFAC,PN | Performed by: NURSE PRACTITIONER

## 2019-01-24 RX ORDER — DOXEPIN HYDROCHLORIDE 25 MG/1
25 CAPSULE ORAL NIGHTLY
COMMUNITY
End: 2020-05-19

## 2019-01-24 RX ORDER — BETAMETHASONE SODIUM PHOSPHATE AND BETAMETHASONE ACETATE 3; 3 MG/ML; MG/ML
6 INJECTION, SUSPENSION INTRA-ARTICULAR; INTRALESIONAL; INTRAMUSCULAR; SOFT TISSUE
Status: COMPLETED | OUTPATIENT
Start: 2019-01-24 | End: 2019-01-24

## 2019-01-24 RX ADMIN — BETAMETHASONE ACETATE AND BETAMETHASONE SODIUM PHOSPHATE 6 MG: 3; 3 INJECTION, SUSPENSION INTRA-ARTICULAR; INTRALESIONAL; INTRAMUSCULAR; SOFT TISSUE at 08:01

## 2019-01-24 ASSESSMENT — ROUTINE ASSESSMENT OF PATIENT INDEX DATA (RAPID3): MDHAQ FUNCTION SCORE: 1.7

## 2019-01-24 NOTE — PROGRESS NOTES
Subjective:       Patient ID: Thomas Sarkar is a 68 y.o. male.    Chief Complaint: Rheumatoid Arthritis and Psoriasis      Thomas is back today for rheumatology followup.     We have followed thomas for seronegative RA with psoriasis overlap and chronic unspecified Colitis. He is followed by GI. His gi issues have been very quiet. He has been treated and failed mtx monotherapy and sulfasalazine. He has failed multiple tnf agents (remicade, enbrel, humira and cimzia),was on IV Orecnica 1000 mg Q 4 weeks  over 1 year- cost was high so Dr Douglass moved him to Orencia sub Q 125 mg weekly dose to see if this would help better and it was def cheaper. He did not notice much change. We tried xeljanz 11 mg/d but he developed diarrhea. We stopped this and moved to actemra 162 mg/wk sub Q. initially with mtx but his liver test went up a little so we stopped mtx and just use actemra monotherapy 162 mg/wk. After 4 weeks of actemra his liver test increased more. We held actemra, continued to hold mtx and held crestor.     Sent him to GI for work up. Dx Fatty liver, pcp dx with insulin resistence. Now on metformin. Watching his diet. Avoiding reg meats and fried foods. Eating a lot more salads and veggies. His lft has slowly come down. We resumed his sub Q orenica after 12 weeks off med. He was miserable, prolonged stiffness in his hands and hips stanley. More hand pain and swelling. Since being back on he can  Def tell a  Difference- his pain level went down from 7 to 4/10 at last visit. Today c/o increased pain in his  hands/hips and generalized. Mild increased psoriasis.   Had flu vaccine done     He had sleep study done. The test reported pain at night interfering with his ability to enter into deep sleep. Recommends maximizing his meds and trying to better control his RA. We will have to keep orenica on board and try to do the best we can for now       Orencia has worked the best of all the agents he has been on but we  "have never been able to achieve remission of his RA. Scalp psoriasis comes and goes, scalp pretty clear right now. He no longer takes any pain meds they did not help. Now on gabapentin and Cymbalta  daily to help some to reduce pain. No side effects.             Psoriasis   Associated symptoms include arthralgias, joint swelling and myalgias. Pertinent negatives include no abdominal pain, chest pain, chills, congestion, coughing, fatigue, fever, nausea, numbness, rash, vomiting or weakness.   Osteoarthritis   Associated symptoms include arthralgias, joint swelling and myalgias. Pertinent negatives include no abdominal pain, chest pain, chills, congestion, coughing, fatigue, fever, nausea, numbness, rash, vomiting or weakness.     Review of Systems   Constitutional: Negative.  Negative for chills, fatigue and fever.   HENT: Negative.  Negative for congestion, mouth sores and trouble swallowing.    Eyes: Negative.  Negative for photophobia, redness and visual disturbance.   Respiratory: Negative.  Negative for cough, shortness of breath and wheezing.    Cardiovascular: Negative.  Negative for chest pain and leg swelling.   Gastrointestinal: Negative.  Negative for abdominal distention, abdominal pain, anal bleeding, blood in stool, constipation, diarrhea, nausea, rectal pain and vomiting.   Genitourinary: Negative.  Negative for dysuria, flank pain, frequency and urgency.   Musculoskeletal: Positive for arthralgias, joint swelling and myalgias. Negative for back pain and gait problem.   Skin: Negative for color change, pallor and rash.   Neurological: Negative.  Negative for dizziness, weakness and numbness.         Objective:     /71   Pulse (!) 57   Ht 5' 7" (1.702 m)   Wt 101.2 kg (223 lb 1.7 oz)   BMI 34.94 kg/m²      Physical Exam   Constitutional: He is oriented to person, place, and time and well-developed, well-nourished, and in no distress. No distress.   HENT:   Head: Normocephalic and atraumatic. "   Right Ear: External ear normal.   Left Ear: External ear normal.   Mouth/Throat: No oropharyngeal exudate.   Eyes: Conjunctivae and EOM are normal. Pupils are equal, round, and reactive to light. No scleral icterus.   Neck: Neck supple. No thyromegaly present.   Cardiovascular: Normal rate, regular rhythm and normal heart sounds.    No murmur heard.  Pulmonary/Chest: Effort normal and breath sounds normal. No respiratory distress.   Abdominal: Soft. Bowel sounds are normal.       Right Side Rheumatological Exam     Examination finds the elbow, knee, 1st PIP, 1st MCP, 4th MCP, 5th PIP and 5th MCP normal.    The patient is tender to palpation of the shoulder, wrist, 2nd PIP, 2nd MCP, 3rd PIP and 4th PIP    He has swelling of the wrist, 2nd PIP and 2nd MCP    Left Side Rheumatological Exam     Examination finds the elbow, knee, 1st PIP, 1st MCP, 2nd PIP, 4th MCP and 5th MCP normal.    The patient is tender to palpation of the shoulder, wrist, 2nd MCP, 3rd PIP and 3rd MCP.    He has swelling of the 2nd MCP      Lymphadenopathy:     He has no cervical adenopathy.   Neurological: He is alert and oriented to person, place, and time. No cranial nerve deficit. He exhibits normal muscle tone. Gait normal. Coordination normal.   Skin: Skin is warm and dry. No rash noted.          Musculoskeletal: He exhibits edema and tenderness.   Puffiness to several fingers and pip joints with tenderness noted on exam  Decreased range of motion pip joints   Not as much tenderness noted  Swelling improved                  Recent Results (from the past 1008 hour(s))   Sedimentation rate, manual    Collection Time: 01/03/19 10:15 AM   Result Value Ref Range    Sed Rate 5 0 - 10 mm/Hr   C-reactive protein    Collection Time: 01/03/19 10:15 AM   Result Value Ref Range    CRP 1.4 0.0 - 8.2 mg/L   Comprehensive metabolic panel    Collection Time: 01/03/19 10:15 AM   Result Value Ref Range    Sodium 140 136 - 145 mmol/L    Potassium 4.9 3.5 - 5.1  mmol/L    Chloride 104 95 - 110 mmol/L    CO2 28 23 - 29 mmol/L    Glucose 128 (H) 70 - 110 mg/dL    BUN, Bld 17 8 - 23 mg/dL    Creatinine 1.1 0.5 - 1.4 mg/dL    Calcium 10.0 8.7 - 10.5 mg/dL    Total Protein 7.5 6.0 - 8.4 g/dL    Albumin 3.7 3.5 - 5.2 g/dL    Total Bilirubin 1.0 0.1 - 1.0 mg/dL    Alkaline Phosphatase 98 55 - 135 U/L    AST 35 10 - 40 U/L    ALT 39 10 - 44 U/L    Anion Gap 8 8 - 16 mmol/L    eGFR if African American >60 >60 mL/min/1.73 m^2    eGFR if non African American >60 >60 mL/min/1.73 m^2   CBC auto differential    Collection Time: 01/03/19 10:15 AM   Result Value Ref Range    WBC 4.29 3.90 - 12.70 K/uL    RBC 4.86 4.60 - 6.20 M/uL    Hemoglobin 14.6 14.0 - 18.0 g/dL    Hematocrit 44.2 40.0 - 54.0 %    MCV 91 82 - 98 fL    MCH 30.0 27.0 - 31.0 pg    MCHC 33.0 32.0 - 36.0 g/dL    RDW 13.1 11.5 - 14.5 %    Platelets 180 150 - 350 K/uL    MPV 11.3 9.2 - 12.9 fL    Gran # (ANC) 2.2 1.8 - 7.7 K/uL    Lymph # 1.5 1.0 - 4.8 K/uL    Mono # 0.5 0.3 - 1.0 K/uL    Eos # 0.1 0.0 - 0.5 K/uL    Baso # 0.02 0.00 - 0.20 K/uL    Gran% 50.8 38.0 - 73.0 %    Lymph% 35.9 18.0 - 48.0 %    Mono% 10.7 4.0 - 15.0 %    Eosinophil% 2.1 0.0 - 8.0 %    Basophil% 0.5 0.0 - 1.9 %    Differential Method Automated      Results for TRI WILL (MRN [id number]) as of 9/5/2018 08:28   Ref. Range 5/10/2018 12:37 6/26/2018 08:02 7/3/2018 07:41 7/30/2018 08:11 8/16/2018 07:32 8/30/2018 07:55   AST Latest Ref Range: 10 - 40 U/L 25 43 (H) 39 84 (H) 104 (H) 76 (H)   ALT Latest Ref Range: 10 - 44 U/L 31 78 (H) 59 (H) 107 (H) 120 (H) 102 (H)   CRP Latest Ref Range: 0.0 - 8.2 mg/L 2.2 6.8  0.3  0.6       Assessment:       1. Rheumatoid arthritis of multiple sites with negative rheumatoid factor    2. Primary osteoarthritis of both knees    3. Medication monitoring encounter    4. Immunocompromised patient    5. Elevated liver enzymes    6. Hepatomegaly            1.  Seronegative rheumatoid arthritis -  Now resuming Orenica monotherapy-  mod  improvement in overall pain but still ongoing activity  still mod DA-  6 Tender/ 4 swollen - cdai 15, RENEE 1.7      Had elevated lft with actemra/mtx/crestor- much better off all these agents   GI work up fatty liver- GI will follow    Will remain off mtx and crestor for now  Watch diet and loose weight  Follow lft closely     Not plt count dropped from 170 to 138 now back to 170 off actemra       Who has failed mtx monotheraoy, failed Enbrel, Remicade, Humira, cimzia- IV Orencia 1000 mg every 4,  Orencia 125 mg SC weekly injection, and xeljanz along with mtx 20 mg weekly      2.  Skin psoriasis stable - has prn topical- no scalp rash today     3.  Chronic unspecified colitis  - not UC- Stable    4.  Osteoarthritis of both knees    5.  Vaccines up to date -had yearly flu vaccine    6.  Medication Monitoring- no current issues, no evidence of toxicity    7.  Immunocompromised no issues with recurrent infections    8.  Chronic pain on gabapentin 600 mg tid and cymbalta 60 mg/d    9. Nocturnal pain issues interfering with normal sleep pattern contributing to daytime sleepiness         Plan:         Instead of IV loading dose will do sub Q loading dose since he was off orencia for so long  150 mg X 2-3 weeks then back to Orencia 125 mg weekly sc injection  Celestone 6 mg IM     Remain off mtx for now  Remain off crestor for now    Watch diet   Loose weight    Next year if lft stabilized then consider adding back low dose crestor with cards  Will keep ra trt monotherapy     Avoid steroid with his gluacoma    C/w gabapentin to 600 mg, keep morning and afternoon dose at  300 mg   Can go up to 900 mg at night if needed    C/w  Cymbalta to  60 mg once daily     Repeat kaushik hand and foot X-rays in 1.5-2 years    rtc 12 weeks with labs (cbc,cmp, esr, crp)-1 wk prior       call with any questions, changes, or concerns

## 2019-01-24 NOTE — PROCEDURES
Fibroscan Procedure     Name: Thomas Sarkar  Date of Procedure : 01/24/2019   :: NARCISA Martinez  Diagnosis: NAFLD      Fibrosis:F3       Steatosis: :S3       Interpretation: Severe steatosis with advanced fibrosis.  Needs follow up every 6 months.   Avoid ETOH.  Strongly encouraged weight loss.

## 2019-01-24 NOTE — PROGRESS NOTES
Administered 1cc Betamethasone 6mg/cc to  LUOQ GluteaL. Pt. Tolerated well. No acute reaction noted to site. Pt. Instructed on S/S to report. Pt. Verbalized understanding. Pt waited 15 minutes.    Lot: [id number]  Exp: 10/19

## 2019-03-08 ENCOUNTER — PATIENT MESSAGE (OUTPATIENT)
Dept: GASTROENTEROLOGY | Facility: CLINIC | Age: 69
End: 2019-03-08

## 2019-03-09 DIAGNOSIS — M47.816 SPONDYLOSIS OF LUMBAR REGION WITHOUT MYELOPATHY OR RADICULOPATHY: Chronic | ICD-10-CM

## 2019-03-09 DIAGNOSIS — G89.29 CHRONIC MUSCULOSKELETAL PAIN: ICD-10-CM

## 2019-03-09 DIAGNOSIS — M06.09 RHEUMATOID ARTHRITIS OF MULTIPLE SITES WITH NEGATIVE RHEUMATOID FACTOR: Chronic | ICD-10-CM

## 2019-03-09 DIAGNOSIS — M17.0 PRIMARY OSTEOARTHRITIS OF BOTH KNEES: ICD-10-CM

## 2019-03-09 DIAGNOSIS — M79.18 CHRONIC MUSCULOSKELETAL PAIN: ICD-10-CM

## 2019-03-13 RX ORDER — DULOXETIN HYDROCHLORIDE 60 MG/1
CAPSULE, DELAYED RELEASE ORAL
Qty: 90 CAPSULE | Refills: 1 | Status: SHIPPED | OUTPATIENT
Start: 2019-03-13 | End: 2019-04-11 | Stop reason: SDUPTHER

## 2019-03-18 DIAGNOSIS — M17.0 PRIMARY OSTEOARTHRITIS OF BOTH KNEES: ICD-10-CM

## 2019-03-18 DIAGNOSIS — M47.816 SPONDYLOSIS OF LUMBAR REGION WITHOUT MYELOPATHY OR RADICULOPATHY: Chronic | ICD-10-CM

## 2019-03-18 DIAGNOSIS — M06.09 RHEUMATOID ARTHRITIS OF MULTIPLE SITES WITH NEGATIVE RHEUMATOID FACTOR: Chronic | ICD-10-CM

## 2019-03-18 RX ORDER — GABAPENTIN 300 MG/1
600 CAPSULE ORAL 3 TIMES DAILY
Qty: 540 CAPSULE | Refills: 3 | Status: SHIPPED | OUTPATIENT
Start: 2019-03-18 | End: 2020-04-19

## 2019-04-01 ENCOUNTER — TELEPHONE (OUTPATIENT)
Dept: RHEUMATOLOGY | Facility: CLINIC | Age: 69
End: 2019-04-01

## 2019-04-01 ENCOUNTER — PATIENT MESSAGE (OUTPATIENT)
Dept: RHEUMATOLOGY | Facility: CLINIC | Age: 69
End: 2019-04-01

## 2019-04-01 NOTE — TELEPHONE ENCOUNTER
Gabapentin PA submitted via cover my meds- previously denied.         PA was already submitted for this patient and drug which was denied.;  CaseId:[id number];  Status:Denied;  Appeal Information: Attention:ATTN: CLINICAL APPEALS DEPARTMENT [employer] [address] [address],[place], [place],[address] Phone:[phone] Fax:[phone];

## 2019-04-03 NOTE — TELEPHONE ENCOUNTER
Spoke with [facility] [employer], questionnaire was sent to  Wrong fax number resulting in denial.     Gabapentin 300 mg capsules with a qty of 540 caps for 90 day supply approved until 4.2.20.    Case ID- [id number]

## 2019-04-11 DIAGNOSIS — G89.29 CHRONIC MUSCULOSKELETAL PAIN: ICD-10-CM

## 2019-04-11 DIAGNOSIS — M79.18 CHRONIC MUSCULOSKELETAL PAIN: ICD-10-CM

## 2019-04-11 DIAGNOSIS — M06.09 RHEUMATOID ARTHRITIS OF MULTIPLE SITES WITH NEGATIVE RHEUMATOID FACTOR: Chronic | ICD-10-CM

## 2019-04-11 DIAGNOSIS — M47.816 SPONDYLOSIS OF LUMBAR REGION WITHOUT MYELOPATHY OR RADICULOPATHY: Chronic | ICD-10-CM

## 2019-04-11 DIAGNOSIS — M17.0 PRIMARY OSTEOARTHRITIS OF BOTH KNEES: ICD-10-CM

## 2019-04-11 RX ORDER — DULOXETIN HYDROCHLORIDE 60 MG/1
60 CAPSULE, DELAYED RELEASE ORAL DAILY
Qty: 90 CAPSULE | Refills: 1 | Status: SHIPPED | OUTPATIENT
Start: 2019-04-11 | End: 2019-04-30 | Stop reason: SDUPTHER

## 2019-04-23 DIAGNOSIS — M06.09 RHEUMATOID ARTHRITIS OF MULTIPLE SITES WITH NEGATIVE RHEUMATOID FACTOR: Chronic | ICD-10-CM

## 2019-04-23 RX ORDER — ABATACEPT 125 MG/ML
INJECTION, SOLUTION SUBCUTANEOUS
Qty: 4 ML | Refills: 6 | Status: SHIPPED | OUTPATIENT
Start: 2019-04-23 | End: 2019-11-12 | Stop reason: SDUPTHER

## 2019-04-23 NOTE — TELEPHONE ENCOUNTER
Tb and acute hep b studies up to date     Results for TRI WILL (MRN [id number]) as of 4/23/2019 07:39   Ref. Range 5/14/2018 07:54 10/1/2018 12:00   Hep B Core Total Ab Unknown  Negative   Hep B S Ab Unknown  Negative   Hepatitis B Surface Ag Unknown  Negative   GGT Latest Ref Range: 8 - 55 U/L  90 (H)   Hepatitis C Ab Unknown  Negative   Mitogen - Nil Latest Ref Range: See text IU/mL 7.587    NIL Latest Ref Range: See text IU/mL 0.124    TB Antigen Latest Ref Range: See text IU/mL 0.111    TB Antigen - Nil Latest Ref Range: See Text IU/mL -0.013    TB Gold Unknown Negative

## 2019-04-30 ENCOUNTER — OFFICE VISIT (OUTPATIENT)
Dept: RHEUMATOLOGY | Facility: CLINIC | Age: 69
End: 2019-04-30
Payer: MEDICARE

## 2019-04-30 ENCOUNTER — LAB VISIT (OUTPATIENT)
Dept: LAB | Facility: HOSPITAL | Age: 69
End: 2019-04-30
Payer: MEDICARE

## 2019-04-30 VITALS
BODY MASS INDEX: 35.68 KG/M2 | HEART RATE: 61 BPM | HEIGHT: 67 IN | SYSTOLIC BLOOD PRESSURE: 137 MMHG | DIASTOLIC BLOOD PRESSURE: 79 MMHG | WEIGHT: 227.31 LBS

## 2019-04-30 DIAGNOSIS — G89.29 CHRONIC MUSCULOSKELETAL PAIN: ICD-10-CM

## 2019-04-30 DIAGNOSIS — M06.09 RHEUMATOID ARTHRITIS OF MULTIPLE SITES WITH NEGATIVE RHEUMATOID FACTOR: Primary | Chronic | ICD-10-CM

## 2019-04-30 DIAGNOSIS — M79.18 CHRONIC MUSCULOSKELETAL PAIN: ICD-10-CM

## 2019-04-30 DIAGNOSIS — K76.0 FATTY LIVER: ICD-10-CM

## 2019-04-30 DIAGNOSIS — D84.9 IMMUNOCOMPROMISED PATIENT: Chronic | ICD-10-CM

## 2019-04-30 DIAGNOSIS — M47.816 SPONDYLOSIS OF LUMBAR REGION WITHOUT MYELOPATHY OR RADICULOPATHY: Chronic | ICD-10-CM

## 2019-04-30 DIAGNOSIS — R74.8 ELEVATED LIVER ENZYMES: ICD-10-CM

## 2019-04-30 DIAGNOSIS — L40.9 PSORIASIS: Chronic | ICD-10-CM

## 2019-04-30 DIAGNOSIS — K52.9 CHRONIC COLITIS: Chronic | ICD-10-CM

## 2019-04-30 DIAGNOSIS — R16.0 HEPATOMEGALY: ICD-10-CM

## 2019-04-30 DIAGNOSIS — Z51.81 MEDICATION MONITORING ENCOUNTER: Chronic | ICD-10-CM

## 2019-04-30 DIAGNOSIS — M17.0 PRIMARY OSTEOARTHRITIS OF BOTH KNEES: ICD-10-CM

## 2019-04-30 DIAGNOSIS — M06.09 RHEUMATOID ARTHRITIS OF MULTIPLE SITES WITH NEGATIVE RHEUMATOID FACTOR: Chronic | ICD-10-CM

## 2019-04-30 LAB
ALBUMIN SERPL BCP-MCNC: 3.7 G/DL (ref 3.5–5.2)
ALP SERPL-CCNC: 83 U/L (ref 55–135)
ALT SERPL W/O P-5'-P-CCNC: 33 U/L (ref 10–44)
ANION GAP SERPL CALC-SCNC: 11 MMOL/L (ref 8–16)
AST SERPL-CCNC: 33 U/L (ref 10–40)
BASOPHILS # BLD AUTO: 0.03 K/UL (ref 0–0.2)
BASOPHILS NFR BLD: 0.5 % (ref 0–1.9)
BILIRUB SERPL-MCNC: 1.4 MG/DL (ref 0.1–1)
BUN SERPL-MCNC: 19 MG/DL (ref 8–23)
CALCIUM SERPL-MCNC: 9.7 MG/DL (ref 8.7–10.5)
CHLORIDE SERPL-SCNC: 106 MMOL/L (ref 95–110)
CO2 SERPL-SCNC: 24 MMOL/L (ref 23–29)
CREAT SERPL-MCNC: 1.1 MG/DL (ref 0.5–1.4)
CRP SERPL-MCNC: 5.8 MG/L (ref 0–8.2)
DIFFERENTIAL METHOD: ABNORMAL
EOSINOPHIL # BLD AUTO: 0.1 K/UL (ref 0–0.5)
EOSINOPHIL NFR BLD: 2.2 % (ref 0–8)
ERYTHROCYTE [DISTWIDTH] IN BLOOD BY AUTOMATED COUNT: 12.8 % (ref 11.5–14.5)
ERYTHROCYTE [SEDIMENTATION RATE] IN BLOOD BY WESTERGREN METHOD: 15 MM/HR (ref 0–23)
EST. GFR  (AFRICAN AMERICAN): >60 ML/MIN/1.73 M^2
EST. GFR  (NON AFRICAN AMERICAN): >60 ML/MIN/1.73 M^2
GLUCOSE SERPL-MCNC: 129 MG/DL (ref 70–110)
HCT VFR BLD AUTO: 42.3 % (ref 40–54)
HGB BLD-MCNC: 14 G/DL (ref 14–18)
IMM GRANULOCYTES # BLD AUTO: 0.04 K/UL (ref 0–0.04)
IMM GRANULOCYTES NFR BLD AUTO: 0.7 % (ref 0–0.5)
LYMPHOCYTES # BLD AUTO: 1.8 K/UL (ref 1–4.8)
LYMPHOCYTES NFR BLD: 30.7 % (ref 18–48)
MCH RBC QN AUTO: 30 PG (ref 27–31)
MCHC RBC AUTO-ENTMCNC: 33.1 G/DL (ref 32–36)
MCV RBC AUTO: 91 FL (ref 82–98)
MONOCYTES # BLD AUTO: 0.6 K/UL (ref 0.3–1)
MONOCYTES NFR BLD: 10 % (ref 4–15)
NEUTROPHILS # BLD AUTO: 3.4 K/UL (ref 1.8–7.7)
NEUTROPHILS NFR BLD: 56.6 % (ref 38–73)
NRBC BLD-RTO: 0 /100 WBC
PLATELET # BLD AUTO: 200 K/UL (ref 150–350)
PMV BLD AUTO: 10.8 FL (ref 9.2–12.9)
POTASSIUM SERPL-SCNC: 4.3 MMOL/L (ref 3.5–5.1)
PROT SERPL-MCNC: 7.3 G/DL (ref 6–8.4)
RBC # BLD AUTO: 4.67 M/UL (ref 4.6–6.2)
SODIUM SERPL-SCNC: 141 MMOL/L (ref 136–145)
WBC # BLD AUTO: 5.99 K/UL (ref 3.9–12.7)

## 2019-04-30 PROCEDURE — 99213 OFFICE O/P EST LOW 20 MIN: CPT | Mod: PBBFAC,PN | Performed by: PHYSICIAN ASSISTANT

## 2019-04-30 PROCEDURE — 85652 RBC SED RATE AUTOMATED: CPT

## 2019-04-30 PROCEDURE — 99999 PR PBB SHADOW E&M-EST. PATIENT-LVL III: ICD-10-PCS | Mod: PBBFAC,,, | Performed by: PHYSICIAN ASSISTANT

## 2019-04-30 PROCEDURE — 99214 PR OFFICE/OUTPT VISIT, EST, LEVL IV, 30-39 MIN: ICD-10-PCS | Mod: S$PBB,,, | Performed by: PHYSICIAN ASSISTANT

## 2019-04-30 PROCEDURE — 86140 C-REACTIVE PROTEIN: CPT

## 2019-04-30 PROCEDURE — 99999 PR PBB SHADOW E&M-EST. PATIENT-LVL III: CPT | Mod: PBBFAC,,, | Performed by: PHYSICIAN ASSISTANT

## 2019-04-30 PROCEDURE — 99214 OFFICE O/P EST MOD 30 MIN: CPT | Mod: S$PBB,,, | Performed by: PHYSICIAN ASSISTANT

## 2019-04-30 PROCEDURE — 80053 COMPREHEN METABOLIC PANEL: CPT

## 2019-04-30 PROCEDURE — 36415 COLL VENOUS BLD VENIPUNCTURE: CPT

## 2019-04-30 PROCEDURE — 85025 COMPLETE CBC W/AUTO DIFF WBC: CPT

## 2019-04-30 RX ORDER — DULOXETIN HYDROCHLORIDE 60 MG/1
60 CAPSULE, DELAYED RELEASE ORAL 2 TIMES DAILY
Qty: 180 CAPSULE | Refills: 1 | Status: SHIPPED | OUTPATIENT
Start: 2019-04-30 | End: 2020-03-09

## 2019-04-30 ASSESSMENT — ROUTINE ASSESSMENT OF PATIENT INDEX DATA (RAPID3): MDHAQ FUNCTION SCORE: 1.4

## 2019-04-30 NOTE — PROGRESS NOTES
Subjective:       Patient ID: Thomas Sarkar is a 68 y.o. male.    Chief Complaint: Rheumatoid Arthritis and Psoriasis      Thomas is back today for rheumatology followup.     We have followed thomas for seronegative RA with psoriasis overlap and chronic unspecified Colitis. He is followed by GI. His gi issues have been very quiet. He has been treated and failed mtx monotherapy and sulfasalazine. He has failed multiple tnf agents (remicade, enbrel, humira and cimzia),was on IV Orecnica 1000 mg Q 4 weeks  over 1 year- cost was high so Dr Douglass moved him to Orencia sub Q 125 mg weekly dose to see if this would help better and it was def cheaper. He did not notice much change. We tried xeljanz 11 mg/d but he developed diarrhea. We stopped this and moved to actemra 162 mg/wk sub Q. initially with mtx but his liver test went up a little so we stopped mtx and just use actemra monotherapy 162 mg/wk. After 4 weeks of actemra his liver test increased more. We held actemra, continued to hold mtx and held crestor. lft remained high. Sent him to GI for work up. Dx Fatty liver, pcp dx with insulin resistence. Now on metformin. Watching his diet. Avoiding reg meats and fried foods. Eating a lot more salads and veggies. His lft has slowly come down and jan 2019 labs back to normal  Will follow up with Keara Chapin in july.       We resumed his sub Q orenica after 12 weeks off med. He was miserable, prolonged stiffness in his hands and hips stanley. More hand pain and swelling. Since being back on he can  Def tell a  Difference- his pain level went down from 7 to 4/10 at last visit. Consistently taking orencia. Today c/o increased pain in his  hands/hips and generalized. Mild increased psoriasis. Pain today rates at 6/10  Prolonged joint stiffness hands, hips stanley.     Orencia has worked the best of all the agents he has been on but we have never been able to achieve remission of his RA. Scalp psoriasis comes and goes, scalp  "pretty clear right now. He no longer takes any pain meds they did not help. Now on gabapentin and Cymbalta  daily to help some to reduce pain. No side effects.           Psoriasis   Associated symptoms include arthralgias, joint swelling and myalgias. Pertinent negatives include no abdominal pain, chest pain, chills, congestion, coughing, fatigue, fever, nausea, numbness, rash, vomiting or weakness.   Osteoarthritis   Associated symptoms include arthralgias, joint swelling and myalgias. Pertinent negatives include no abdominal pain, chest pain, chills, congestion, coughing, fatigue, fever, nausea, numbness, rash, vomiting or weakness.     Review of Systems   Constitutional: Negative.  Negative for chills, fatigue and fever.   HENT: Negative.  Negative for congestion, mouth sores and trouble swallowing.    Eyes: Negative.  Negative for photophobia, redness and visual disturbance.   Respiratory: Negative.  Negative for cough, shortness of breath and wheezing.    Cardiovascular: Negative.  Negative for chest pain and leg swelling.   Gastrointestinal: Negative.  Negative for abdominal distention, abdominal pain, anal bleeding, blood in stool, constipation, diarrhea, nausea, rectal pain and vomiting.   Genitourinary: Negative.  Negative for dysuria, flank pain, frequency and urgency.   Musculoskeletal: Positive for arthralgias, joint swelling and myalgias. Negative for back pain and gait problem.   Skin: Negative for color change, pallor and rash.   Neurological: Negative.  Negative for dizziness, weakness and numbness.         Objective:     /79   Pulse 61   Ht 5' 7" (1.702 m)   Wt 103.1 kg (227 lb 4.7 oz)   BMI 35.60 kg/m²      Physical Exam   Constitutional: He is oriented to person, place, and time and well-developed, well-nourished, and in no distress. No distress.   HENT:   Head: Normocephalic and atraumatic.   Right Ear: External ear normal.   Left Ear: External ear normal.   Mouth/Throat: No " oropharyngeal exudate.   Eyes: Conjunctivae and EOM are normal. Pupils are equal, round, and reactive to light. No scleral icterus.   Neck: Neck supple. No thyromegaly present.   Cardiovascular: Normal rate, regular rhythm and normal heart sounds.    No murmur heard.  Pulmonary/Chest: Effort normal and breath sounds normal. No respiratory distress.   Abdominal: Soft. Bowel sounds are normal.       Right Side Rheumatological Exam     Examination finds the elbow, knee, 1st PIP, 1st MCP, 4th MCP, 5th PIP and 5th MCP normal.    The patient is tender to palpation of the shoulder, wrist, 2nd PIP, 2nd MCP, 3rd PIP and 4th PIP    He has swelling of the wrist, 2nd PIP and 2nd MCP    Left Side Rheumatological Exam     Examination finds the elbow, knee, 1st PIP, 1st MCP, 2nd PIP, 4th MCP and 5th MCP normal.    The patient is tender to palpation of the shoulder, wrist, 2nd MCP, 3rd PIP and 3rd MCP.    He has swelling of the 2nd MCP      Lymphadenopathy:     He has no cervical adenopathy.   Neurological: He is alert and oriented to person, place, and time. No cranial nerve deficit. He exhibits normal muscle tone. Gait normal. Coordination normal.   Skin: Skin is warm and dry. No rash noted.          Musculoskeletal: He exhibits edema and tenderness.   Puffiness to several fingers and pip joints with tenderness noted on exam  Decreased range of motion pip joints   Not as much tenderness noted  Swelling improved                  Recent Results (from the past 1008 hour(s))   CBC auto differential    Collection Time: 04/30/19  7:44 AM   Result Value Ref Range    WBC 5.99 3.90 - 12.70 K/uL    RBC 4.67 4.60 - 6.20 M/uL    Hemoglobin 14.0 14.0 - 18.0 g/dL    Hematocrit 42.3 40.0 - 54.0 %    Mean Corpuscular Volume 91 82 - 98 fL    Mean Corpuscular Hemoglobin 30.0 27.0 - 31.0 pg    Mean Corpuscular Hemoglobin Conc 33.1 32.0 - 36.0 g/dL    RDW 12.8 11.5 - 14.5 %    Platelets 200 150 - 350 K/uL    MPV 10.8 9.2 - 12.9 fL    Immature  Granulocytes 0.7 (H) 0.0 - 0.5 %    Gran # (ANC) 3.4 1.8 - 7.7 K/uL    Immature Grans (Abs) 0.04 0.00 - 0.04 K/uL    Lymph # 1.8 1.0 - 4.8 K/uL    Mono # 0.6 0.3 - 1.0 K/uL    Eos # 0.1 0.0 - 0.5 K/uL    Baso # 0.03 0.00 - 0.20 K/uL    nRBC 0 0 /100 WBC    Gran% 56.6 38.0 - 73.0 %    Lymph% 30.7 18.0 - 48.0 %    Mono% 10.0 4.0 - 15.0 %    Eosinophil% 2.2 0.0 - 8.0 %    Basophil% 0.5 0.0 - 1.9 %    Differential Method Automated            Assessment:       1. Rheumatoid arthritis of multiple sites with negative rheumatoid factor    2. Psoriasis    3. Chronic colitis    4. Medication monitoring encounter    5. Immunocompromised patient    6. Primary osteoarthritis of both knees    7. Elevated liver enzymes    8. Fatty liver    9. Hepatomegaly    10. Spondylosis of lumbar region without myelopathy or radiculopathy    11. Chronic musculoskeletal pain            1.  Seronegative rheumatoid arthritis -  Now resuming Orenica monotherapy- mod  improvement in overall pain but still ongoing activity  still mod DA-  6 Tender/ 4 swollen - cdai 15, RENEE 1.7      Had elevated lft with actemra/mtx/crestor- much better off all these agents   GI work up fatty liver- GI will follow    Will remain off mtx and crestor for now  Watch diet and loose weight  Follow lft closely     Not plt count dropped from 170 to 138 now back to 170 off actemra       Who has failed mtx monotheraoy, failed Enbrel, Remicade, Humira, cimzia- IV Orencia 1000 mg every 4,  Orencia 125 mg SC weekly injection, and xeljanz along with mtx 20 mg weekly      2.  Skin psoriasis stable - has prn topical- no scalp rash today     3.  Chronic unspecified colitis  - not UC- Stable    4.  Osteoarthritis of both knees    5.  Vaccines up to date -had yearly flu vaccine    6.  Medication Monitoring- no current issues, no evidence of toxicity    7.  Immunocompromised no issues with recurrent infections    8.  Chronic pain on gabapentin 600 mg tid and cymbalta 60 mg/d- ongoing      9. Nocturnal pain issues interfering with normal sleep pattern contributing to daytime sleepiness         Plan:         When exacerbation occurs can change his  Orencia 125 mg injection go Q 5 days then back to Q 7 days when doing better      Remain off mtx for now  Remain off crestor for now    Watch diet   Loose weight    Next year if lft stabilized then consider adding back low dose crestor with cards  Will keep ra trt monotherapy     Avoid steroid with his gluacoma    C/w gabapentin to 600 mg, keep morning and afternoon dose at  300 mg   Can go up to 900 mg at night if needed  Can adjust up on gabapentin more next visit if needed     Increase  Cymbalta to  60 mg twice daily     Repeat kaushik hand and foot X-rays in 1.5-2 years    rtc 12 weeks with labs (cbc,cmp, esr, crp)-1 wk prior       call with any questions, changes, or concerns

## 2019-07-09 ENCOUNTER — LAB VISIT (OUTPATIENT)
Dept: LAB | Facility: HOSPITAL | Age: 69
End: 2019-07-09
Attending: NURSE PRACTITIONER
Payer: MEDICARE

## 2019-07-09 DIAGNOSIS — K76.0 FATTY LIVER: ICD-10-CM

## 2019-07-09 DIAGNOSIS — R16.0 HEPATOMEGALY: ICD-10-CM

## 2019-07-09 LAB
ALBUMIN SERPL BCP-MCNC: 2.9 G/DL (ref 3.5–5.2)
ALP SERPL-CCNC: 94 U/L (ref 55–135)
ALT SERPL W/O P-5'-P-CCNC: 17 U/L (ref 10–44)
ANION GAP SERPL CALC-SCNC: 10 MMOL/L (ref 8–16)
AST SERPL-CCNC: 19 U/L (ref 10–40)
BASOPHILS # BLD AUTO: 0.03 K/UL (ref 0–0.2)
BASOPHILS NFR BLD: 0.4 % (ref 0–1.9)
BILIRUB SERPL-MCNC: 1 MG/DL (ref 0.1–1)
BUN SERPL-MCNC: 25 MG/DL (ref 8–23)
CALCIUM SERPL-MCNC: 9.4 MG/DL (ref 8.7–10.5)
CHLORIDE SERPL-SCNC: 102 MMOL/L (ref 95–110)
CO2 SERPL-SCNC: 25 MMOL/L (ref 23–29)
CREAT SERPL-MCNC: 1.9 MG/DL (ref 0.5–1.4)
DIFFERENTIAL METHOD: ABNORMAL
EOSINOPHIL # BLD AUTO: 0.1 K/UL (ref 0–0.5)
EOSINOPHIL NFR BLD: 1.5 % (ref 0–8)
ERYTHROCYTE [DISTWIDTH] IN BLOOD BY AUTOMATED COUNT: 12.4 % (ref 11.5–14.5)
EST. GFR  (AFRICAN AMERICAN): 41 ML/MIN/1.73 M^2
EST. GFR  (NON AFRICAN AMERICAN): 35.4 ML/MIN/1.73 M^2
GLUCOSE SERPL-MCNC: 99 MG/DL (ref 70–110)
HCT VFR BLD AUTO: 35.8 % (ref 40–54)
HGB BLD-MCNC: 11.9 G/DL (ref 14–18)
IMM GRANULOCYTES # BLD AUTO: 0.07 K/UL (ref 0–0.04)
IMM GRANULOCYTES NFR BLD AUTO: 0.9 % (ref 0–0.5)
INR PPP: 1 (ref 0.8–1.2)
LYMPHOCYTES # BLD AUTO: 0.9 K/UL (ref 1–4.8)
LYMPHOCYTES NFR BLD: 11.3 % (ref 18–48)
MCH RBC QN AUTO: 30.4 PG (ref 27–31)
MCHC RBC AUTO-ENTMCNC: 33.2 G/DL (ref 32–36)
MCV RBC AUTO: 92 FL (ref 82–98)
MONOCYTES # BLD AUTO: 1 K/UL (ref 0.3–1)
MONOCYTES NFR BLD: 13.5 % (ref 4–15)
NEUTROPHILS # BLD AUTO: 5.4 K/UL (ref 1.8–7.7)
NEUTROPHILS NFR BLD: 72.4 % (ref 38–73)
NRBC BLD-RTO: 0 /100 WBC
PLATELET # BLD AUTO: 218 K/UL (ref 150–350)
PMV BLD AUTO: 11.4 FL (ref 9.2–12.9)
POTASSIUM SERPL-SCNC: 4.8 MMOL/L (ref 3.5–5.1)
PROT SERPL-MCNC: 7.3 G/DL (ref 6–8.4)
PROTHROMBIN TIME: 10.1 SEC (ref 9–12.5)
RBC # BLD AUTO: 3.91 M/UL (ref 4.6–6.2)
SODIUM SERPL-SCNC: 137 MMOL/L (ref 136–145)
WBC # BLD AUTO: 7.5 K/UL (ref 3.9–12.7)

## 2019-07-09 PROCEDURE — 85025 COMPLETE CBC W/AUTO DIFF WBC: CPT

## 2019-07-09 PROCEDURE — 80053 COMPREHEN METABOLIC PANEL: CPT

## 2019-07-09 PROCEDURE — 85610 PROTHROMBIN TIME: CPT

## 2019-07-09 PROCEDURE — 36415 COLL VENOUS BLD VENIPUNCTURE: CPT

## 2019-07-11 ENCOUNTER — OFFICE VISIT (OUTPATIENT)
Dept: GASTROENTEROLOGY | Facility: CLINIC | Age: 69
End: 2019-07-11
Payer: MEDICARE

## 2019-07-11 VITALS
WEIGHT: 228.38 LBS | HEART RATE: 66 BPM | SYSTOLIC BLOOD PRESSURE: 134 MMHG | HEIGHT: 67 IN | DIASTOLIC BLOOD PRESSURE: 70 MMHG | BODY MASS INDEX: 35.84 KG/M2

## 2019-07-11 DIAGNOSIS — R16.0 HEPATOMEGALY: ICD-10-CM

## 2019-07-11 DIAGNOSIS — R93.2 ABNORMAL FINDINGS ON DIAGNOSTIC IMAGING OF LIVER AND BILIARY TRACT: ICD-10-CM

## 2019-07-11 DIAGNOSIS — R74.8 ELEVATED LIVER ENZYMES: ICD-10-CM

## 2019-07-11 DIAGNOSIS — K76.0 FATTY LIVER: Primary | ICD-10-CM

## 2019-07-11 PROCEDURE — 99214 OFFICE O/P EST MOD 30 MIN: CPT | Mod: S$PBB,,, | Performed by: NURSE PRACTITIONER

## 2019-07-11 PROCEDURE — 99999 PR PBB SHADOW E&M-EST. PATIENT-LVL V: CPT | Mod: PBBFAC,,, | Performed by: NURSE PRACTITIONER

## 2019-07-11 PROCEDURE — 99215 OFFICE O/P EST HI 40 MIN: CPT | Mod: PBBFAC | Performed by: NURSE PRACTITIONER

## 2019-07-11 PROCEDURE — 99214 PR OFFICE/OUTPT VISIT, EST, LEVL IV, 30-39 MIN: ICD-10-PCS | Mod: S$PBB,,, | Performed by: NURSE PRACTITIONER

## 2019-07-11 PROCEDURE — 99999 PR PBB SHADOW E&M-EST. PATIENT-LVL V: ICD-10-PCS | Mod: PBBFAC,,, | Performed by: NURSE PRACTITIONER

## 2019-07-11 RX ORDER — LEVOFLOXACIN 500 MG/1
500 TABLET, FILM COATED ORAL
COMMUNITY
End: 2019-08-13

## 2019-07-11 RX ORDER — HYDROCODONE BITARTRATE AND ACETAMINOPHEN 10; 325 MG/1; MG/1
TABLET ORAL
Refills: 0 | COMMUNITY
Start: 2019-07-08 | End: 2020-05-19

## 2019-07-11 RX ORDER — CHOLECALCIFEROL (VITAMIN D3) 25 MCG
2000 TABLET ORAL DAILY
COMMUNITY

## 2019-07-11 RX ORDER — PROMETHAZINE HYDROCHLORIDE 25 MG/1
TABLET ORAL
Refills: 0 | COMMUNITY
Start: 2019-07-08 | End: 2019-08-13

## 2019-07-11 NOTE — PROGRESS NOTES
Clinic Follow Up:  [facility] Follow Up Note    Reason for Follow Up:  The primary encounter diagnosis was Fatty liver. Diagnoses of Hepatomegaly, Elevated liver enzymes, and Abnormal findings on diagnostic imaging of liver and biliary tract  were also pertinent to this visit.    PCP: Suzette Alvarez       HPI:  This is a 68 y.o. male here for follow up of the above  Pt states that he has been feeling well without GI complaints  Recently underwent lithotripsy for kidney stones  Recent labs show an elevated creatinine.   Denies any abdominal pain.  No nausea or vomiting.  No change in bowel pattern.  No melena or hematochezia. No weight loss.  No upper GI bleeding.  No ascites or BLE.  No overt confusion.      Review of Systems   Constitutional: Negative for chills, fever, malaise/fatigue and weight loss.   Respiratory: Negative for cough.    Cardiovascular: Negative for chest pain.   Gastrointestinal:        Per HPI   Musculoskeletal: Negative for myalgias.   Skin: Negative for itching and rash.   Neurological: Negative for headaches.   Psychiatric/Behavioral: The patient is not nervous/anxious.        Medical History:  Past Medical History:   Diagnosis Date    Acid reflux     Allergy     Arthritis     PSORIATIC    Cataract     CHF (congestive heart failure)     Chronic kidney disease     Dry eyes     Glaucoma     SECONDARY TO STEROIDS    Heart murmur     Hyperlipidemia     Myocardial infarction     Rheumatoid arthritis(714.0)     Scalp tenderness     FROM PSORIASIS       Surgical History:   Past Surgical History:   Procedure Laterality Date    ADENOIDECTOMY      CARDIAC CATHETERIZATION  2010    CATARACT EXTRACTION, BILATERAL  2012    EYE SURGERY      TONSILLECTOMY         Family History:   Family History   Problem Relation Age of Onset    Alcohol abuse Father     Cancer Father     Diabetes Mellitus Father     Hyperlipidemia Father     Heart disease Paternal Grandmother      Stroke Paternal Grandfather     Hyperlipidemia Paternal Grandfather     Heart disease Mother     Osteoarthritis Mother     Asthma Daughter        Social History:   Social History     Tobacco Use    Smoking status: Former Smoker     Types: Cigarettes     Last attempt to quit: 1970     Years since quittin.0    Smokeless tobacco: Never Used   Substance Use Topics    Alcohol use: No    Drug use: No       Allergies: Reviewed    Home Medications:  Current Outpatient Medications on File Prior to Visit   Medication Sig Dispense Refill    aspirin (ECOTRIN) 81 MG EC tablet Take 81 mg by mouth once daily.      doxepin (SINEQUAN) 25 MG capsule Take 25 mg by mouth every evening.      DULoxetine (CYMBALTA) 60 MG capsule Take 1 capsule (60 mg total) by mouth 2 (two) times daily. 180 capsule 1    fish oil-omega-3 fatty acids 300-1,000 mg capsule Take 1 g by mouth once daily.      gabapentin (NEURONTIN) 300 MG capsule Take 2 capsules (600 mg total) by mouth 3 (three) times daily. 540 capsule 3    HYDROcodone-acetaminophen (NORCO)  mg per tablet TAKE 1 TABLET BY MOUTH EVERY 6 HOURS AS NEEDED FOR PAIN. MAY CAUSE DROWSINESS NO REFILL  0    levoFLOXacin (LEVAQUIN) 500 MG tablet Take 500 mg by mouth.      metFORMIN (GLUCOPHAGE-XR) 500 MG 24 hr tablet       methylcellulose, laxative, 500 mg Tab Take by mouth once daily.      metoprolol succinate (TOPROL-XL) 50 MG 24 hr tablet 1 tablet.      multivitamin capsule Take 1 capsule by mouth.      ORENCIA CLICKJECT 125 mg/mL AtIn INJECT 125 MG (1 ML) UNDER THE SKIN ONCE A WEEK 4 mL 6    pantoprazole (PROTONIX) 40 MG tablet 40 mg once daily.      promethazine (PHENERGAN) 25 MG tablet TAKE 1 TABLET BY MOUTH EVERY 6 HOURS AS NEEDED FOR NAUSEA AND VOMITING. MAY CAUSE DROWSINESS NO REFILL  0    RESTASIS 0.05 % ophthalmic emulsion       TAMSULOSIN HCL (FLOMAX ORAL) Take by mouth.      vitamin D (VITAMIN D3) 1000 units Tab Take 2,000 Units by mouth once  "daily.       No current facility-administered medications on file prior to visit.        Physical Exam:  Vital Signs:  /70   Pulse 66   Ht 5' 7" (1.702 m)   Wt 103.6 kg (228 lb 6.3 oz)   BMI 35.77 kg/m²   Body mass index is 35.77 kg/m².  Physical Exam   Constitutional: He is oriented to person, place, and time. He appears well-developed.   HENT:   Head: Normocephalic.   Neck: Normal range of motion.   Cardiovascular: Normal rate and regular rhythm.   Pulmonary/Chest: Effort normal and breath sounds normal.   Abdominal: Soft. Bowel sounds are normal. He exhibits no distension. There is no tenderness.   Musculoskeletal: Normal range of motion.   Neurological: He is alert and oriented to person, place, and time.   Skin: Skin is warm and dry.   Psychiatric: He has a normal mood and affect.   Vitals reviewed.      Labs: Pertinent labs reviewed.      Assessment:  1. Fatty liver    2. Hepatomegaly    3. Elevated liver enzymes    4. Abnormal findings on diagnostic imaging of liver and biliary tract         Recommendations:  - stable without GI complaints  - weight loss with improved nutrition and exercise.   - Improved lipid and glycemic control    Return to Clinic:    6 months with pre-clinic labs and imaging.   "

## 2019-07-17 ENCOUNTER — TELEPHONE (OUTPATIENT)
Dept: RADIOLOGY | Facility: HOSPITAL | Age: 69
End: 2019-07-17

## 2019-07-18 ENCOUNTER — HOSPITAL ENCOUNTER (OUTPATIENT)
Dept: RADIOLOGY | Facility: HOSPITAL | Age: 69
Discharge: HOME | End: 2019-07-18
Attending: NURSE PRACTITIONER
Payer: MEDICARE

## 2019-07-18 DIAGNOSIS — R16.0 HEPATOMEGALY: ICD-10-CM

## 2019-07-18 DIAGNOSIS — K76.0 FATTY LIVER: ICD-10-CM

## 2019-07-18 DIAGNOSIS — R74.8 ELEVATED LIVER ENZYMES: ICD-10-CM

## 2019-07-18 PROCEDURE — 76700 US EXAM ABDOM COMPLETE: CPT | Mod: 26,,, | Performed by: RADIOLOGY

## 2019-07-18 PROCEDURE — 76700 US EXAM ABDOM COMPLETE: CPT | Mod: TC

## 2019-07-18 PROCEDURE — 76700 US ABDOMEN COMPLETE: ICD-10-PCS | Mod: 26,,, | Performed by: RADIOLOGY

## 2019-08-13 ENCOUNTER — OFFICE VISIT (OUTPATIENT)
Dept: RHEUMATOLOGY | Facility: CLINIC | Age: 69
End: 2019-08-13
Payer: MEDICARE

## 2019-08-13 ENCOUNTER — LAB VISIT (OUTPATIENT)
Dept: LAB | Facility: HOSPITAL | Age: 69
End: 2019-08-13
Attending: PHYSICIAN ASSISTANT
Payer: MEDICARE

## 2019-08-13 VITALS
BODY MASS INDEX: 33.67 KG/M2 | WEIGHT: 214.5 LBS | DIASTOLIC BLOOD PRESSURE: 83 MMHG | HEART RATE: 66 BPM | HEIGHT: 67 IN | SYSTOLIC BLOOD PRESSURE: 140 MMHG

## 2019-08-13 DIAGNOSIS — L40.9 PSORIASIS: Chronic | ICD-10-CM

## 2019-08-13 DIAGNOSIS — Z51.81 MEDICATION MONITORING ENCOUNTER: Chronic | ICD-10-CM

## 2019-08-13 DIAGNOSIS — M17.0 PRIMARY OSTEOARTHRITIS OF BOTH KNEES: ICD-10-CM

## 2019-08-13 DIAGNOSIS — M79.18 CHRONIC MUSCULOSKELETAL PAIN: ICD-10-CM

## 2019-08-13 DIAGNOSIS — R74.8 ELEVATED LIVER ENZYMES: ICD-10-CM

## 2019-08-13 DIAGNOSIS — M06.09 RHEUMATOID ARTHRITIS OF MULTIPLE SITES WITH NEGATIVE RHEUMATOID FACTOR: Chronic | ICD-10-CM

## 2019-08-13 DIAGNOSIS — G89.29 CHRONIC MUSCULOSKELETAL PAIN: ICD-10-CM

## 2019-08-13 DIAGNOSIS — M06.09 RHEUMATOID ARTHRITIS OF MULTIPLE SITES WITH NEGATIVE RHEUMATOID FACTOR: Primary | Chronic | ICD-10-CM

## 2019-08-13 DIAGNOSIS — D84.9 IMMUNOCOMPROMISED PATIENT: Chronic | ICD-10-CM

## 2019-08-13 LAB
ALBUMIN SERPL BCP-MCNC: 3.7 G/DL (ref 3.5–5.2)
ALP SERPL-CCNC: 92 U/L (ref 55–135)
ALT SERPL W/O P-5'-P-CCNC: 23 U/L (ref 10–44)
ANION GAP SERPL CALC-SCNC: 13 MMOL/L (ref 8–16)
AST SERPL-CCNC: 23 U/L (ref 10–40)
BASOPHILS # BLD AUTO: 0.04 K/UL (ref 0–0.2)
BASOPHILS NFR BLD: 0.6 % (ref 0–1.9)
BILIRUB SERPL-MCNC: 1.7 MG/DL (ref 0.1–1)
BUN SERPL-MCNC: 26 MG/DL (ref 8–23)
CALCIUM SERPL-MCNC: 10.1 MG/DL (ref 8.7–10.5)
CHLORIDE SERPL-SCNC: 102 MMOL/L (ref 95–110)
CO2 SERPL-SCNC: 25 MMOL/L (ref 23–29)
CREAT SERPL-MCNC: 1.7 MG/DL (ref 0.5–1.4)
CRP SERPL-MCNC: 17.8 MG/L (ref 0–8.2)
DIFFERENTIAL METHOD: ABNORMAL
EOSINOPHIL # BLD AUTO: 0.1 K/UL (ref 0–0.5)
EOSINOPHIL NFR BLD: 1.9 % (ref 0–8)
ERYTHROCYTE [DISTWIDTH] IN BLOOD BY AUTOMATED COUNT: 13.2 % (ref 11.5–14.5)
ERYTHROCYTE [SEDIMENTATION RATE] IN BLOOD BY WESTERGREN METHOD: 32 MM/HR (ref 0–23)
EST. GFR  (AFRICAN AMERICAN): 47 ML/MIN/1.73 M^2
EST. GFR  (NON AFRICAN AMERICAN): 41 ML/MIN/1.73 M^2
GLUCOSE SERPL-MCNC: 116 MG/DL (ref 70–110)
HCT VFR BLD AUTO: 38.2 % (ref 40–54)
HGB BLD-MCNC: 13 G/DL (ref 14–18)
IMM GRANULOCYTES # BLD AUTO: 0.05 K/UL (ref 0–0.04)
IMM GRANULOCYTES NFR BLD AUTO: 0.8 % (ref 0–0.5)
LYMPHOCYTES # BLD AUTO: 1.8 K/UL (ref 1–4.8)
LYMPHOCYTES NFR BLD: 28.1 % (ref 18–48)
MCH RBC QN AUTO: 30.7 PG (ref 27–31)
MCHC RBC AUTO-ENTMCNC: 34 G/DL (ref 32–36)
MCV RBC AUTO: 90 FL (ref 82–98)
MONOCYTES # BLD AUTO: 0.6 K/UL (ref 0.3–1)
MONOCYTES NFR BLD: 10 % (ref 4–15)
NEUTROPHILS # BLD AUTO: 3.8 K/UL (ref 1.8–7.7)
NEUTROPHILS NFR BLD: 59.4 % (ref 38–73)
NRBC BLD-RTO: 0 /100 WBC
PLATELET # BLD AUTO: 200 K/UL (ref 150–350)
PMV BLD AUTO: 10.8 FL (ref 9.2–12.9)
POTASSIUM SERPL-SCNC: 4.3 MMOL/L (ref 3.5–5.1)
PROT SERPL-MCNC: 7.5 G/DL (ref 6–8.4)
RBC # BLD AUTO: 4.24 M/UL (ref 4.6–6.2)
SODIUM SERPL-SCNC: 140 MMOL/L (ref 136–145)
WBC # BLD AUTO: 6.4 K/UL (ref 3.9–12.7)

## 2019-08-13 PROCEDURE — 99213 OFFICE O/P EST LOW 20 MIN: CPT | Mod: PBBFAC | Performed by: PHYSICIAN ASSISTANT

## 2019-08-13 PROCEDURE — 99214 OFFICE O/P EST MOD 30 MIN: CPT | Mod: S$PBB,,, | Performed by: PHYSICIAN ASSISTANT

## 2019-08-13 PROCEDURE — 85652 RBC SED RATE AUTOMATED: CPT

## 2019-08-13 PROCEDURE — 36415 COLL VENOUS BLD VENIPUNCTURE: CPT

## 2019-08-13 PROCEDURE — 99999 PR PBB SHADOW E&M-EST. PATIENT-LVL III: CPT | Mod: PBBFAC,,, | Performed by: PHYSICIAN ASSISTANT

## 2019-08-13 PROCEDURE — 85025 COMPLETE CBC W/AUTO DIFF WBC: CPT

## 2019-08-13 PROCEDURE — 99999 PR PBB SHADOW E&M-EST. PATIENT-LVL III: ICD-10-PCS | Mod: PBBFAC,,, | Performed by: PHYSICIAN ASSISTANT

## 2019-08-13 PROCEDURE — 80053 COMPREHEN METABOLIC PANEL: CPT

## 2019-08-13 PROCEDURE — 99214 PR OFFICE/OUTPT VISIT, EST, LEVL IV, 30-39 MIN: ICD-10-PCS | Mod: S$PBB,,, | Performed by: PHYSICIAN ASSISTANT

## 2019-08-13 PROCEDURE — 86140 C-REACTIVE PROTEIN: CPT

## 2019-08-13 RX ORDER — FINASTERIDE 5 MG/1
TABLET, FILM COATED ORAL
COMMUNITY
Start: 2019-08-08 | End: 2022-01-20

## 2019-08-13 ASSESSMENT — CLINICAL DISEASE ACTIVITY INDEX (CDAI)
TOTAL_SCORE: 15
SWOLLEN_JOINTS_COUNT: 2
PHYSICIAN_ASSESSMENT: 3
PATIENT_ASSESSMENT: 4
TENDER_JOINTS_COUNT: 6

## 2019-08-13 ASSESSMENT — ROUTINE ASSESSMENT OF PATIENT INDEX DATA (RAPID3): MDHAQ FUNCTION SCORE: 1.1

## 2019-08-13 NOTE — PROGRESS NOTES
Subjective:       Patient ID: Thomas Sarkar is a 69 y.o. male.    Chief Complaint: Disease Management and Rheumatoid Arthritis      Thomas is back today for rheumatology followup.     We have followed thomas for seronegative RA with psoriasis overlap and chronic unspecified Colitis. He is followed by GI. His gi issues have been very quiet. He has been treated and failed mtx monotherapy and sulfasalazine. He has failed multiple tnf agents (remicade, enbrel, humira and cimzia),was on IV Orecnica 1000 mg Q 4 weeks  over 1 year- cost was high so Dr Douglass moved him to Orencia sub Q 125 mg weekly dose to see if this would help better and it was def cheaper. He did not notice much change. We tried xeljanz 11 mg/d but he developed diarrhea. We stopped this and moved to actemra 162 mg/wk sub Q. initially with mtx but his liver test went up a little so we stopped mtx and just use actemra monotherapy 162 mg/wk. After 4 weeks of actemra his liver test increased more. We held actemra, continued to hold mtx and held crestor. lft remained high. Sent him to GI for work up. Dx Fatty liver, pcp dx with insulin resistence. Now on metformin. Watching his diet. Avoiding reg meats and fried foods. Eating a lot more salads and veggies. His lft has slowly come down and jan 2019 labs back to normal- Will follow up with [facility] last month, lft back to normal  - lost 20 lb- watching his diet. Glucose tighter control       We resumed his sub Q orenica after 12 weeks off med. He was miserable, prolonged stiffness in his hands and hips stanley. More hand pain and swelling. Since being back on he can  Def tell a  Difference- his pain level went down from 7 to 4/10 at last visit. He is still doing the same. Skin doing fair psoriasis comes and goes but under control for most part. Pain today rates at 4/10  Prolonged joint stiffness hands, hips stanley.     Orencia has worked the best of all the agents he has been on but we have never been  able to achieve remission of his RA. Scalp psoriasis comes and goes, scalp pretty clear right now. He no longer takes any pain meds they did not help. Now on gabapentin and Cymbalta  daily to help some to reduce pain. No side effects.     Kidney stone kaushik- some elevated creatinine- better today  Enlarged prostate also.           Psoriasis   Associated symptoms include arthralgias, joint swelling and myalgias. Pertinent negatives include no abdominal pain, chest pain, chills, congestion, coughing, fatigue, fever, nausea, numbness, rash, vomiting or weakness.   Osteoarthritis   Associated symptoms include arthralgias, joint swelling and myalgias. Pertinent negatives include no abdominal pain, chest pain, chills, congestion, coughing, fatigue, fever, nausea, numbness, rash, vomiting or weakness.           He complains of joint swelling. Associated symptoms include myalgias. Pertinent negatives include no dysuria, fatigue, fever or trouble swallowing.     His past medical history is significant for osteoarthritis.     Review of Systems   Constitutional: Negative.  Negative for chills, fatigue and fever.   HENT: Negative.  Negative for congestion, mouth sores and trouble swallowing.    Eyes: Negative.  Negative for photophobia, redness and visual disturbance.   Respiratory: Negative.  Negative for cough, shortness of breath and wheezing.    Cardiovascular: Negative.  Negative for chest pain and leg swelling.   Gastrointestinal: Negative.  Negative for abdominal distention, abdominal pain, anal bleeding, blood in stool, constipation, diarrhea, nausea, rectal pain and vomiting.   Genitourinary: Negative.  Negative for dysuria, flank pain, frequency and urgency.   Musculoskeletal: Positive for arthralgias, joint swelling and myalgias. Negative for back pain and gait problem.   Skin: Negative for color change, pallor and rash.   Neurological: Negative.  Negative for dizziness, weakness and numbness.         Objective:     BP  "(!) 140/83   Pulse 66   Ht 5' 7" (1.702 m)   Wt 97.3 kg (214 lb 8.1 oz)   BMI 33.60 kg/m²      Physical Exam   Constitutional: He is oriented to person, place, and time and well-developed, well-nourished, and in no distress. No distress.   HENT:   Head: Normocephalic and atraumatic.   Right Ear: External ear normal.   Left Ear: External ear normal.   Mouth/Throat: No oropharyngeal exudate.   Eyes: Conjunctivae and EOM are normal. Pupils are equal, round, and reactive to light. No scleral icterus.   Neck: Neck supple. No thyromegaly present.   Cardiovascular: Normal rate, regular rhythm and normal heart sounds.    No murmur heard.  Pulmonary/Chest: Effort normal and breath sounds normal. No respiratory distress.   Abdominal: Soft. Bowel sounds are normal.       Right Side Rheumatological Exam     Examination finds the elbow, knee, 1st PIP, 1st MCP, 4th MCP, 5th PIP and 5th MCP normal.    The patient is tender to palpation of the shoulder, wrist, 2nd PIP, 2nd MCP, 3rd PIP and 4th PIP    He has swelling of the wrist, 2nd PIP and 2nd MCP    Left Side Rheumatological Exam     Examination finds the elbow, knee, 1st PIP, 1st MCP, 2nd PIP, 4th MCP and 5th MCP normal.    The patient is tender to palpation of the shoulder, wrist, 2nd MCP, 3rd PIP and 3rd MCP.    He has swelling of the 2nd MCP      Lymphadenopathy:     He has no cervical adenopathy.   Neurological: He is alert and oriented to person, place, and time. No cranial nerve deficit. He exhibits normal muscle tone. Gait normal. Coordination normal.   Skin: Skin is warm and dry. No rash noted.          Musculoskeletal: He exhibits edema and tenderness.   Puffiness to several fingers and pip joints with tenderness noted on exam  Decreased range of motion pip joints   Not as much tenderness noted  Swelling improved            Immunization History   Administered Date(s) Administered    Influenza 10/02/2018    Influenza - High Dose 10/30/2014, 10/28/2015, 10/11/2016 "    Influenza - Quadrivalent 10/30/2017    Influenza Split 11/07/2013    PPD Test 08/07/2012, 10/28/2015    Pneumococcal Conjugate 11/14/2013    Pneumococcal Polysaccharide - 23 Valent 06/11/2014    Tdap 11/07/2013    Zoster 11/21/2013         Recent Results (from the past 1008 hour(s))   CBC auto differential    Collection Time: 07/09/19  9:06 AM   Result Value Ref Range    WBC 7.50 3.90 - 12.70 K/uL    RBC 3.91 (L) 4.60 - 6.20 M/uL    Hemoglobin 11.9 (L) 14.0 - 18.0 g/dL    Hematocrit 35.8 (L) 40.0 - 54.0 %    Mean Corpuscular Volume 92 82 - 98 fL    Mean Corpuscular Hemoglobin 30.4 27.0 - 31.0 pg    Mean Corpuscular Hemoglobin Conc 33.2 32.0 - 36.0 g/dL    RDW 12.4 11.5 - 14.5 %    Platelets 218 150 - 350 K/uL    MPV 11.4 9.2 - 12.9 fL    Immature Granulocytes 0.9 (H) 0.0 - 0.5 %    Gran # (ANC) 5.4 1.8 - 7.7 K/uL    Immature Grans (Abs) 0.07 (H) 0.00 - 0.04 K/uL    Lymph # 0.9 (L) 1.0 - 4.8 K/uL    Mono # 1.0 0.3 - 1.0 K/uL    Eos # 0.1 0.0 - 0.5 K/uL    Baso # 0.03 0.00 - 0.20 K/uL    nRBC 0 0 /100 WBC    Gran% 72.4 38.0 - 73.0 %    Lymph% 11.3 (L) 18.0 - 48.0 %    Mono% 13.5 4.0 - 15.0 %    Eosinophil% 1.5 0.0 - 8.0 %    Basophil% 0.4 0.0 - 1.9 %    Differential Method Automated    Comprehensive metabolic panel    Collection Time: 07/09/19  9:06 AM   Result Value Ref Range    Sodium 137 136 - 145 mmol/L    Potassium 4.8 3.5 - 5.1 mmol/L    Chloride 102 95 - 110 mmol/L    CO2 25 23 - 29 mmol/L    Glucose 99 70 - 110 mg/dL    BUN, Bld 25 (H) 8 - 23 mg/dL    Creatinine 1.9 (H) 0.5 - 1.4 mg/dL    Calcium 9.4 8.7 - 10.5 mg/dL    Total Protein 7.3 6.0 - 8.4 g/dL    Albumin 2.9 (L) 3.5 - 5.2 g/dL    Total Bilirubin 1.0 0.1 - 1.0 mg/dL    Alkaline Phosphatase 94 55 - 135 U/L    AST 19 10 - 40 U/L    ALT 17 10 - 44 U/L    Anion Gap 10 8 - 16 mmol/L    eGFR if African American 41.0 (A) >60 mL/min/1.73 m^2    eGFR if non  35.4 (A) >60 mL/min/1.73 m^2   Protime-INR    Collection Time: 07/09/19  9:06  AM   Result Value Ref Range    Prothrombin Time 10.1 9.0 - 12.5 sec    INR 1.0 0.8 - 1.2   C-reactive protein    Collection Time: 08/13/19  7:43 AM   Result Value Ref Range    CRP 17.8 (H) 0.0 - 8.2 mg/L   Comprehensive metabolic panel    Collection Time: 08/13/19  7:43 AM   Result Value Ref Range    Sodium 140 136 - 145 mmol/L    Potassium 4.3 3.5 - 5.1 mmol/L    Chloride 102 95 - 110 mmol/L    CO2 25 23 - 29 mmol/L    Glucose 116 (H) 70 - 110 mg/dL    BUN, Bld 26 (H) 8 - 23 mg/dL    Creatinine 1.7 (H) 0.5 - 1.4 mg/dL    Calcium 10.1 8.7 - 10.5 mg/dL    Total Protein 7.5 6.0 - 8.4 g/dL    Albumin 3.7 3.5 - 5.2 g/dL    Total Bilirubin 1.7 (H) 0.1 - 1.0 mg/dL    Alkaline Phosphatase 92 55 - 135 U/L    AST 23 10 - 40 U/L    ALT 23 10 - 44 U/L    Anion Gap 13 8 - 16 mmol/L    eGFR if African American 47 (A) >60 mL/min/1.73 m^2    eGFR if non African American 41 (A) >60 mL/min/1.73 m^2   CBC auto differential    Collection Time: 08/13/19  7:43 AM   Result Value Ref Range    WBC 6.40 3.90 - 12.70 K/uL    RBC 4.24 (L) 4.60 - 6.20 M/uL    Hemoglobin 13.0 (L) 14.0 - 18.0 g/dL    Hematocrit 38.2 (L) 40.0 - 54.0 %    Mean Corpuscular Volume 90 82 - 98 fL    Mean Corpuscular Hemoglobin 30.7 27.0 - 31.0 pg    Mean Corpuscular Hemoglobin Conc 34.0 32.0 - 36.0 g/dL    RDW 13.2 11.5 - 14.5 %    Platelets 200 150 - 350 K/uL    MPV 10.8 9.2 - 12.9 fL    Immature Granulocytes 0.8 (H) 0.0 - 0.5 %    Gran # (ANC) 3.8 1.8 - 7.7 K/uL    Immature Grans (Abs) 0.05 (H) 0.00 - 0.04 K/uL    Lymph # 1.8 1.0 - 4.8 K/uL    Mono # 0.6 0.3 - 1.0 K/uL    Eos # 0.1 0.0 - 0.5 K/uL    Baso # 0.04 0.00 - 0.20 K/uL    nRBC 0 0 /100 WBC    Gran% 59.4 38.0 - 73.0 %    Lymph% 28.1 18.0 - 48.0 %    Mono% 10.0 4.0 - 15.0 %    Eosinophil% 1.9 0.0 - 8.0 %    Basophil% 0.6 0.0 - 1.9 %    Differential Method Automated           Ref. Range 5/14/2018 07:54 10/1/2018 12:00   Hep B Core Total Ab Unknown  Negative   Hep B S Ab Unknown  Negative   Hepatitis B Surface  Ag Unknown  Negative   Hepatitis C Ab Unknown  Negative   Mitogen - Nil Latest Ref Range: See text IU/mL 7.587    NIL Latest Ref Range: See text IU/mL 0.124    TB Antigen Latest Ref Range: See text IU/mL 0.111    TB Antigen - Nil Latest Ref Range: See Text IU/mL -0.013    TB Gold Unknown Negative          Assessment:       1. Rheumatoid arthritis of multiple sites with negative rheumatoid factor    2. Primary osteoarthritis of both knees    3. Chronic musculoskeletal pain    4. Elevated liver enzymes    5. Immunocompromised patient    6. Psoriasis    7. Medication monitoring encounter            1.  Seronegative rheumatoid arthritis -  Stable on Orenica monotherapy- mod  improvement in overall pain but still ongoing activity  still mod DA-  6 Tender/ 2 swollen - cdai 15, RENEE 1.1  Will have to be content with mild/mod activity for now      Had elevated lft with actemra/mtx/crestor- much better off all these agents   GI work up fatty liver- GI will follow    Will remain off mtx and crestor for now  Watch diet and loose weight  Follow lft closely         Who has failed mtx monotheraoy, failed Enbrel, Remicade, Humira, cimzia- IV Orencia 1000 mg every 4,  Orencia 125 mg SC weekly injection, and xeljanz along with mtx 20 mg weekly      2.  Skin psoriasis stable - has prn topical- no scalp rash today     3.  Chronic unspecified colitis  - not UC- Stable    4.  Osteoarthritis of both knees    5.  Vaccines up to date -had yearly flu vaccine    6.  Medication Monitoring- no current issues, no evidence of toxicity    7.  Immunocompromised no issues with recurrent infections    8.  Chronic pain on gabapentin 600 mg tid and cymbalta 120 mg/d- ongoing mild daily pain     9. - mild elevated creatinine- better- recent renal stone       Plan:         When exacerbation occurs can change his  Orencia 125 mg injection go Q 5 days then back to Q 7 days when doing better      Remain off mtx for now  Remain off crestor for now    Watch  diet   Loose weight  Watch lft and renal function     Maintain hydration  F/w with urology     Next year if lft stabilized then consider adding back low dose crestor with cards  Will keep ra trt monotherapy     Avoid steroid with his gluacoma    C/w gabapentin to 600 mg, keep morning and afternoon dose at  300 mg   Can go up to 900 mg at night if needed  Can adjust up on gabapentin more next visit if needed     C/w Cymbalta to  60 mg twice daily     Repeat kaushik hand and foot X-rays in 1.5-2 years    rtc 12 weeks with labs (cbc,cmp, esr, crp)-1 wk prior     Flu shot in the fall       call with any questions, changes, or concerns

## 2019-09-07 ENCOUNTER — PATIENT MESSAGE (OUTPATIENT)
Dept: RHEUMATOLOGY | Facility: CLINIC | Age: 69
End: 2019-09-07

## 2019-11-12 ENCOUNTER — LAB VISIT (OUTPATIENT)
Dept: LAB | Facility: HOSPITAL | Age: 69
End: 2019-11-12
Attending: PHYSICIAN ASSISTANT
Payer: MEDICARE

## 2019-11-12 ENCOUNTER — OFFICE VISIT (OUTPATIENT)
Dept: RHEUMATOLOGY | Facility: CLINIC | Age: 69
End: 2019-11-12
Payer: MEDICARE

## 2019-11-12 VITALS
BODY MASS INDEX: 35.54 KG/M2 | HEIGHT: 67 IN | SYSTOLIC BLOOD PRESSURE: 140 MMHG | WEIGHT: 226.44 LBS | HEART RATE: 66 BPM | DIASTOLIC BLOOD PRESSURE: 78 MMHG

## 2019-11-12 DIAGNOSIS — M06.09 RHEUMATOID ARTHRITIS OF MULTIPLE SITES WITH NEGATIVE RHEUMATOID FACTOR: Chronic | ICD-10-CM

## 2019-11-12 DIAGNOSIS — M17.0 PRIMARY OSTEOARTHRITIS OF BOTH KNEES: ICD-10-CM

## 2019-11-12 DIAGNOSIS — K52.9 CHRONIC COLITIS: Chronic | ICD-10-CM

## 2019-11-12 DIAGNOSIS — L40.9 PSORIASIS: Chronic | ICD-10-CM

## 2019-11-12 DIAGNOSIS — G89.29 CHRONIC MUSCULOSKELETAL PAIN: ICD-10-CM

## 2019-11-12 DIAGNOSIS — M06.09 RHEUMATOID ARTHRITIS OF MULTIPLE SITES WITH NEGATIVE RHEUMATOID FACTOR: Primary | Chronic | ICD-10-CM

## 2019-11-12 DIAGNOSIS — D84.9 IMMUNOCOMPROMISED PATIENT: Chronic | ICD-10-CM

## 2019-11-12 DIAGNOSIS — Z79.899 HIGH RISK MEDICATION USE: ICD-10-CM

## 2019-11-12 DIAGNOSIS — R74.8 ELEVATED LIVER ENZYMES: ICD-10-CM

## 2019-11-12 DIAGNOSIS — M79.18 CHRONIC MUSCULOSKELETAL PAIN: ICD-10-CM

## 2019-11-12 LAB
ALBUMIN SERPL BCP-MCNC: 3.9 G/DL (ref 3.5–5.2)
ALP SERPL-CCNC: 89 U/L (ref 55–135)
ALT SERPL W/O P-5'-P-CCNC: <5 U/L (ref 10–44)
ANION GAP SERPL CALC-SCNC: 11 MMOL/L (ref 8–16)
AST SERPL-CCNC: 28 U/L (ref 10–40)
BASOPHILS # BLD AUTO: 0.04 K/UL (ref 0–0.2)
BASOPHILS NFR BLD: 0.8 % (ref 0–1.9)
BILIRUB SERPL-MCNC: 0.8 MG/DL (ref 0.1–1)
BUN SERPL-MCNC: 18 MG/DL (ref 8–23)
CALCIUM SERPL-MCNC: 9.6 MG/DL (ref 8.7–10.5)
CHLORIDE SERPL-SCNC: 107 MMOL/L (ref 95–110)
CO2 SERPL-SCNC: 25 MMOL/L (ref 23–29)
CREAT SERPL-MCNC: 1.1 MG/DL (ref 0.5–1.4)
CRP SERPL-MCNC: 6.1 MG/L (ref 0–8.2)
DIFFERENTIAL METHOD: ABNORMAL
EOSINOPHIL # BLD AUTO: 0.1 K/UL (ref 0–0.5)
EOSINOPHIL NFR BLD: 2.4 % (ref 0–8)
ERYTHROCYTE [DISTWIDTH] IN BLOOD BY AUTOMATED COUNT: 12.4 % (ref 11.5–14.5)
ERYTHROCYTE [SEDIMENTATION RATE] IN BLOOD BY WESTERGREN METHOD: 28 MM/HR (ref 0–23)
EST. GFR  (AFRICAN AMERICAN): >60 ML/MIN/1.73 M^2
EST. GFR  (NON AFRICAN AMERICAN): >60 ML/MIN/1.73 M^2
GLUCOSE SERPL-MCNC: 97 MG/DL (ref 70–110)
HCT VFR BLD AUTO: 39.5 % (ref 40–54)
HGB BLD-MCNC: 13.2 G/DL (ref 14–18)
IMM GRANULOCYTES # BLD AUTO: 0.04 K/UL (ref 0–0.04)
IMM GRANULOCYTES NFR BLD AUTO: 0.8 % (ref 0–0.5)
LYMPHOCYTES # BLD AUTO: 1.5 K/UL (ref 1–4.8)
LYMPHOCYTES NFR BLD: 30.7 % (ref 18–48)
MCH RBC QN AUTO: 31 PG (ref 27–31)
MCHC RBC AUTO-ENTMCNC: 33.4 G/DL (ref 32–36)
MCV RBC AUTO: 93 FL (ref 82–98)
MONOCYTES # BLD AUTO: 0.6 K/UL (ref 0.3–1)
MONOCYTES NFR BLD: 11.1 % (ref 4–15)
NEUTROPHILS # BLD AUTO: 2.7 K/UL (ref 1.8–7.7)
NEUTROPHILS NFR BLD: 55 % (ref 38–73)
NRBC BLD-RTO: 0 /100 WBC
PLATELET # BLD AUTO: 219 K/UL (ref 150–350)
PMV BLD AUTO: 10.8 FL (ref 9.2–12.9)
POTASSIUM SERPL-SCNC: 4.8 MMOL/L (ref 3.5–5.1)
PROT SERPL-MCNC: 7.7 G/DL (ref 6–8.4)
RBC # BLD AUTO: 4.26 M/UL (ref 4.6–6.2)
SODIUM SERPL-SCNC: 143 MMOL/L (ref 136–145)
WBC # BLD AUTO: 4.95 K/UL (ref 3.9–12.7)

## 2019-11-12 PROCEDURE — 99214 OFFICE O/P EST MOD 30 MIN: CPT | Mod: S$PBB,,, | Performed by: PHYSICIAN ASSISTANT

## 2019-11-12 PROCEDURE — 85025 COMPLETE CBC W/AUTO DIFF WBC: CPT

## 2019-11-12 PROCEDURE — 99214 PR OFFICE/OUTPT VISIT, EST, LEVL IV, 30-39 MIN: ICD-10-PCS | Mod: S$PBB,,, | Performed by: PHYSICIAN ASSISTANT

## 2019-11-12 PROCEDURE — 86140 C-REACTIVE PROTEIN: CPT

## 2019-11-12 PROCEDURE — 99999 PR PBB SHADOW E&M-EST. PATIENT-LVL IV: ICD-10-PCS | Mod: PBBFAC,,, | Performed by: PHYSICIAN ASSISTANT

## 2019-11-12 PROCEDURE — 80053 COMPREHEN METABOLIC PANEL: CPT

## 2019-11-12 PROCEDURE — 85652 RBC SED RATE AUTOMATED: CPT

## 2019-11-12 PROCEDURE — 99999 PR PBB SHADOW E&M-EST. PATIENT-LVL IV: CPT | Mod: PBBFAC,,, | Performed by: PHYSICIAN ASSISTANT

## 2019-11-12 PROCEDURE — 99214 OFFICE O/P EST MOD 30 MIN: CPT | Mod: PBBFAC | Performed by: PHYSICIAN ASSISTANT

## 2019-11-12 PROCEDURE — 36415 COLL VENOUS BLD VENIPUNCTURE: CPT

## 2019-11-12 RX ORDER — CARBIDOPA AND LEVODOPA 10; 100 MG/1; MG/1
10-100 TABLET, ORALLY DISINTEGRATING ORAL
COMMUNITY
Start: 2019-10-14 | End: 2020-05-19 | Stop reason: DRUGHIGH

## 2019-11-12 RX ORDER — ABATACEPT 125 MG/ML
INJECTION, SOLUTION SUBCUTANEOUS
Qty: 4 ML | Refills: 6 | Status: SHIPPED | OUTPATIENT
Start: 2019-11-12 | End: 2020-06-09

## 2019-11-12 ASSESSMENT — ROUTINE ASSESSMENT OF PATIENT INDEX DATA (RAPID3): MDHAQ FUNCTION SCORE: 1

## 2019-11-12 NOTE — PATIENT INSTRUCTIONS
Can increase gabapentin to 2 capsules in am and/or mid day when having more pain  Continue 2 capsules at night

## 2019-11-12 NOTE — PROGRESS NOTES
Subjective:       Patient ID: Thomas Sarkar is a 69 y.o. male.    Chief Complaint: Rheumatoid Arthritis      Thomas is back today for rheumatology followup.     We have followed thomas for seronegative RA with psoriasis overlap and chronic unspecified Colitis. He is followed by GI. His gi issues have been very quiet. He has been treated and failed mtx monotherapy and sulfasalazine. He has failed multiple tnf agents (remicade, enbrel, humira and cimzia), failed xeljanz, actemra caused elevated lft and low plt,   Was on IV Orecnica 1000 mg Q 4 weeks  over 1 year- cost was high so Dr Douglass moved him to Orencia sub Q 125 mg weekly dose; this has been the best control we were able to get    Yesterday had more pain and stiffness; better today but ongoing prolonged stiffness in his hands and hips stanley.  his pain level went down from 6/10  Skin doing fair psoriasis comes and goes but under control for most part.      Now on gabapentin and Cymbalta  daily to help some to reduce pain but does not seem to be working as well. No side effects.     recenlty dx with parkinson's- on sinemet seeing DR Espinosa at [facility]     Had his flu shot done                 He complains of joint swelling. Associated symptoms include myalgias. Pertinent negatives include no dysuria, fatigue, fever or trouble swallowing.     His past medical history is significant for osteoarthritis.   Psoriasis   Associated symptoms include arthralgias, joint swelling and myalgias. Pertinent negatives include no abdominal pain, chest pain, chills, congestion, coughing, fatigue, fever, nausea, numbness, rash, vomiting or weakness.   Osteoarthritis   Associated symptoms include arthralgias, joint swelling and myalgias. Pertinent negatives include no abdominal pain, chest pain, chills, congestion, coughing, fatigue, fever, nausea, numbness, rash, vomiting or weakness.     Review of Systems   Constitutional: Negative.  Negative for chills, fatigue and fever.  "  HENT: Negative.  Negative for congestion, mouth sores and trouble swallowing.    Eyes: Negative.  Negative for photophobia, redness and visual disturbance.   Respiratory: Negative.  Negative for cough, shortness of breath and wheezing.    Cardiovascular: Negative.  Negative for chest pain and leg swelling.   Gastrointestinal: Negative.  Negative for abdominal distention, abdominal pain, anal bleeding, blood in stool, constipation, diarrhea, nausea, rectal pain and vomiting.   Genitourinary: Negative.  Negative for dysuria, flank pain, frequency and urgency.   Musculoskeletal: Positive for arthralgias, joint swelling and myalgias. Negative for back pain and gait problem.   Skin: Negative for color change, pallor and rash.   Neurological: Negative.  Negative for dizziness, weakness and numbness.         Objective:     Ht 5' 7" (1.702 m)   Wt 102.7 kg (226 lb 6.6 oz)   BMI 35.46 kg/m²      Physical Exam   Constitutional: He is oriented to person, place, and time and well-developed, well-nourished, and in no distress. No distress.   HENT:   Head: Normocephalic and atraumatic.   Right Ear: External ear normal.   Left Ear: External ear normal.   Mouth/Throat: No oropharyngeal exudate.   Eyes: Conjunctivae and EOM are normal. Pupils are equal, round, and reactive to light. No scleral icterus.   Neck: Neck supple. No thyromegaly present.   Cardiovascular: Normal rate, regular rhythm and normal heart sounds.    No murmur heard.  Pulmonary/Chest: Effort normal and breath sounds normal. No respiratory distress.   Abdominal: Soft. Bowel sounds are normal.       Right Side Rheumatological Exam     Examination finds the elbow, knee, 1st PIP, 1st MCP, 4th MCP, 5th PIP and 5th MCP normal.    The patient is tender to palpation of the shoulder, wrist, 2nd PIP, 2nd MCP, 3rd PIP and 4th PIP    He has swelling of the wrist, 2nd PIP and 2nd MCP    Left Side Rheumatological Exam     Examination finds the elbow, knee, 1st PIP, 1st MCP, " 2nd PIP, 4th MCP and 5th MCP normal.    The patient is tender to palpation of the shoulder, wrist, 2nd MCP, 3rd PIP and 3rd MCP.    He has swelling of the 2nd MCP      Lymphadenopathy:     He has no cervical adenopathy.   Neurological: He is alert and oriented to person, place, and time. No cranial nerve deficit. He exhibits normal muscle tone. Gait normal. Coordination normal.   Skin: Skin is warm and dry. No rash noted.          Musculoskeletal: He exhibits edema and tenderness.   Puffiness to several fingers and pip joints with tenderness noted on exam  Decreased range of motion pip joints   Not as much tenderness noted  Swelling improved            Immunization History   Administered Date(s) Administered    Influenza 10/02/2018    Influenza - High Dose - PF (65 years and older) 10/30/2014, 10/28/2015, 10/11/2016    Influenza - Quadrivalent 10/30/2017    Influenza Split 11/07/2013    PPD Test 08/07/2012, 10/28/2015    Pneumococcal Conjugate 11/14/2013    Pneumococcal Polysaccharide - 23 Valent 06/11/2014    Tdap 11/07/2013    Zoster 11/21/2013         Recent Results (from the past 1008 hour(s))   CBC auto differential    Collection Time: 10/02/19  8:41 AM   Result Value Ref Range    WBC 6.5 3.4 - 10.8 x10E3/uL    RBC 4.24 4.14 - 5.80 x10E6/uL    Hemoglobin 12.8 (L) 13.0 - 17.7 g/dL    Hematocrit 38.4 37.5 - 51.0 %    Mean Corpuscular Volume 91 79 - 97 fL    Mean Corpuscular Hemoglobin 30.2 26.6 - 33.0 pg    Mean Corpuscular Hemoglobin Conc 33.3 31.5 - 35.7 g/dL    RDW 15.0 12.3 - 15.4 %    Platelets 222 150 - 450 x10E3/uL    Neutrophils 60 Not Estab. %    Lymph% 28 Not Estab. %    Mono% 8 Not Estab. %    Eosinophil% 2 Not Estab. %    Basophil% 1 Not Estab. %    Neutrophils Absolute 3.9 1.4 - 7.0 x10E3/uL    Lymph # 1.8 0.7 - 3.1 x10E3/uL    Mono # 0.5 0.1 - 0.9 x10E3/uL    Eos # 0.2 0.0 - 0.4 x10E3/uL    Baso # 0.0 0.0 - 0.2 x10E3/uL    Immature Granulocytes 1 Not Estab. %    Immature Grans (Abs) 0.0 0.0  - 0.1 x10E3/uL   Comprehensive metabolic panel    Collection Time: 10/02/19  8:41 AM   Result Value Ref Range    Glucose 95 65 - 99 mg/dL    BUN, Bld 15 8 - 27 mg/dL    Creatinine 0.93 0.76 - 1.27 mg/dL    eGFR if non African American 83 >59 mL/min/1.73    eGFR if  96 >59 mL/min/1.73    BUN/Creatinine Ratio 16 10 - 24    Sodium 142 134 - 144 mmol/L    Potassium 5.1 3.5 - 5.2 mmol/L    Chloride 102 96 - 106 mmol/L    CO2 23 20 - 29 mmol/L    Calcium 9.2 8.6 - 10.2 mg/dL    Total Protein 7.2 6.0 - 8.5 g/dL    Albumin 4.3 3.6 - 4.8 g/dL    Globulin, Total 2.9 1.5 - 4.5 g/dL    Albumin/Globulin Ratio 1.5 1.2 - 2.2    Total Bilirubin 0.5 0.0 - 1.2 mg/dL    Alkaline Phosphatase 100 39 - 117 IU/L    AST 28 0 - 40 IU/L    ALT 31 0 - 44 IU/L   Urinalysis Complete    Collection Time: 10/02/19  8:41 AM   Result Value Ref Range    Specific Gravity, UA 1.022 1.005 - 1.030    pH, UA 5.5 5.0 - 7.5    Color, UA Yellow Yellow    Clarity, UA Clear Clear    Leukocytes, UA Negative Negative    Protein, UA Negative Negative/Trace    Glucose, UA Negative Negative    Ketones, UA Negative Negative    Occult Blood UA Negative Negative    Bilirubin, UA Negative Negative    Urobilinogen, UA 0.2 0.2 - 1.0 mg/dL    Nitrite, UA Negative Negative    Microscopic Examination Comment     Microscopic Examination See below:    Microscopic Examination    Collection Time: 10/02/19  8:41 AM   Result Value Ref Range    WBC, UA 0-5 0 - 5 /hpf    RBC, UA 0-2 0 - 2 /hpf    Epithelial Cells (non renal) None seen 0 - 10 /hpf    Mucus, UA Present Not Estab.    Bacteria, UA None seen None seen/Few   Lipid panel    Collection Time: 10/02/19  8:41 AM   Result Value Ref Range    Cholesterol 276 (H) 100 - 199 mg/dL    Triglycerides 323 (H) 0 - 149 mg/dL    HDL 51 >39 mg/dL    VLDL Cholesterol Alo 65 (H) 5 - 40 mg/dL    LDL Calculated 160 (H) 0 - 99 mg/dL   Hemoglobin A1c    Collection Time: 10/02/19  8:41 AM   Result Value Ref Range    Hemoglobin A1C  5.9 (H) 4.8 - 5.6 %   TSH    Collection Time: 10/02/19  8:41 AM   Result Value Ref Range    TSH 3.090 0.450 - 4.500 uIU/mL          Ref. Range 5/14/2018 07:54 10/1/2018 12:00   Hep B Core Total Ab Unknown  Negative   Hep B S Ab Unknown  Negative   Hepatitis B Surface Ag Unknown  Negative   Hepatitis C Ab Unknown  Negative   Mitogen - Nil Latest Ref Range: See text IU/mL 7.587    NIL Latest Ref Range: See text IU/mL 0.124    TB Antigen Latest Ref Range: See text IU/mL 0.111    TB Antigen - Nil Latest Ref Range: See Text IU/mL -0.013    TB Gold Unknown Negative          Assessment:       1. Rheumatoid arthritis of multiple sites with negative rheumatoid factor    2. Primary osteoarthritis of both knees    3. Chronic musculoskeletal pain    4. High risk medication use    5. Chronic colitis    6. Elevated liver enzymes    7. Immunocompromised patient    8. Psoriasis            1.  Seronegative rheumatoid arthritis -  Stable on Orenica monotherapy- mod  improvement in overall pain but still ongoing activity    still mod   PAULINO-28 (CRP): 4.35 (Moderate disease activity)  cdai 20, RENEE 1.1  Will have to be content with mild/mod activity for now      Had elevated lft with actemra/mtx/crestor- much better off all these agents   GI work up fatty liver- GI will follow    Will remain off mtx and crestor for now  Watch diet and loose weight  Follow lft closely     Who has failed mtx monotheraoy, failed Enbrel, Remicade, Humira, cimzia- IV Orencia 1000 mg every 4,  Orencia 125 mg SC weekly injection, and xeljanz along with mtx 20 mg weekly      2.  Skin psoriasis stable - has prn topical- no scalp rash today     3.  Chronic unspecified colitis  - not UC- Stable    4.  Osteoarthritis of both knees    5.  Vaccines up to date -had yearly flu vaccine    6.  Medication Monitoring- no current issues, no evidence of toxicity    7.  Immunocompromised no issues with recurrent infections    8.  Chronic pain on gabapentin 600 mg tid and  cymbalta 120 mg/d- ongoing mild daily pain     9. Recent dx parkinsons on medication- seeing neurology      Plan:         When exacerbation occurs can change his  Orencia 125 mg injection go Q 5 days then back to Q 7 days when doing better      Remain off mtx for now  Remain off crestor for now    Watch diet   Loose weight  Watch lft and renal function     Maintain hydration  F/w with urology     Next year if lft stabilized then consider adding back low dose crestor with cards  Will keep ra trt monotherapy     Avoid steroid with his gluacoma    C/w gabapentin to 300 mg am and mid day, 600 mg at night  On days he has more pain can go up to 600 mg tid    C/w Cymbalta to  60 mg twice daily     Repeat kaushik hand and foot X-rays in 1.5-2 years    rtc 16 weeks with labs (cbc,cmp, esr, crp)-1 wk prior       call with any questions, changes, or concerns

## 2019-12-18 ENCOUNTER — TELEPHONE (OUTPATIENT)
Dept: RHEUMATOLOGY | Facility: CLINIC | Age: 69
End: 2019-12-18

## 2020-01-06 ENCOUNTER — TELEPHONE (OUTPATIENT)
Dept: RADIOLOGY | Facility: HOSPITAL | Age: 70
End: 2020-01-06

## 2020-01-07 ENCOUNTER — HOSPITAL ENCOUNTER (OUTPATIENT)
Dept: RADIOLOGY | Facility: HOSPITAL | Age: 70
Discharge: HOME | End: 2020-01-07
Attending: NURSE PRACTITIONER
Payer: MEDICARE

## 2020-01-07 ENCOUNTER — PROCEDURE VISIT (OUTPATIENT)
Dept: GASTROENTEROLOGY | Facility: CLINIC | Age: 70
End: 2020-01-07
Attending: NURSE PRACTITIONER
Payer: MEDICARE

## 2020-01-07 VITALS
DIASTOLIC BLOOD PRESSURE: 74 MMHG | HEART RATE: 66 BPM | HEIGHT: 67 IN | BODY MASS INDEX: 35.02 KG/M2 | SYSTOLIC BLOOD PRESSURE: 136 MMHG | WEIGHT: 223.13 LBS

## 2020-01-07 DIAGNOSIS — K76.0 FATTY LIVER: ICD-10-CM

## 2020-01-07 DIAGNOSIS — R74.8 ELEVATED LIVER ENZYMES: ICD-10-CM

## 2020-01-07 DIAGNOSIS — R16.0 HEPATOMEGALY: ICD-10-CM

## 2020-01-07 PROCEDURE — 91200 LIVER ELASTOGRAPHY: CPT | Mod: PBBFAC | Performed by: NURSE PRACTITIONER

## 2020-01-07 PROCEDURE — 91200 PR LIVER ELASTOGRAPHY W/OUT IMAG W/INTERP & REPORT: ICD-10-PCS | Mod: 26,S$PBB,, | Performed by: NURSE PRACTITIONER

## 2020-01-07 PROCEDURE — 76700 US ABDOMEN COMPLETE: ICD-10-PCS | Mod: 26,,, | Performed by: RADIOLOGY

## 2020-01-07 PROCEDURE — 91200 LIVER ELASTOGRAPHY: CPT | Mod: 26,S$PBB,, | Performed by: NURSE PRACTITIONER

## 2020-01-07 PROCEDURE — 76700 US EXAM ABDOM COMPLETE: CPT | Mod: 59,TC

## 2020-01-07 PROCEDURE — 76700 US EXAM ABDOM COMPLETE: CPT | Mod: 26,,, | Performed by: RADIOLOGY

## 2020-01-07 RX ORDER — CIPROFLOXACIN 500 MG/1
TABLET ORAL
COMMUNITY
Start: 2020-01-02 | End: 2020-05-19

## 2020-01-07 NOTE — Clinical Note
Severe steatosis with mild fibrosis.  Needs to continue with weight loss, improved glucose and lipids Follow up every 6 months as previously planned.

## 2020-01-10 ENCOUNTER — PATIENT MESSAGE (OUTPATIENT)
Dept: GASTROENTEROLOGY | Facility: CLINIC | Age: 70
End: 2020-01-10

## 2020-01-13 ENCOUNTER — PATIENT MESSAGE (OUTPATIENT)
Dept: GASTROENTEROLOGY | Facility: CLINIC | Age: 70
End: 2020-01-13

## 2020-01-13 NOTE — PROCEDURES
Fibroscan Procedure     Name: Thomas Sarkar  Date of Procedure : 2020   :: NARCISA Martinez  Diagnosis: NAFLD    Probe: XL    Fibroscan readin.2 KPa    Fibrosis:F 0-1     CAP readin dB/m    Steatosis: :S3       Interpretation:   Severe steatosis with mild fibrosis.    Needs to continue with weight loss, improved glucose and lipids   Follow up every 6 months as previously planned.

## 2020-01-23 ENCOUNTER — PATIENT MESSAGE (OUTPATIENT)
Dept: RHEUMATOLOGY | Facility: CLINIC | Age: 70
End: 2020-01-23

## 2020-03-08 DIAGNOSIS — M47.816 SPONDYLOSIS OF LUMBAR REGION WITHOUT MYELOPATHY OR RADICULOPATHY: Chronic | ICD-10-CM

## 2020-03-08 DIAGNOSIS — M79.18 CHRONIC MUSCULOSKELETAL PAIN: ICD-10-CM

## 2020-03-08 DIAGNOSIS — G89.29 CHRONIC MUSCULOSKELETAL PAIN: ICD-10-CM

## 2020-03-08 DIAGNOSIS — M17.0 PRIMARY OSTEOARTHRITIS OF BOTH KNEES: ICD-10-CM

## 2020-03-08 DIAGNOSIS — M06.09 RHEUMATOID ARTHRITIS OF MULTIPLE SITES WITH NEGATIVE RHEUMATOID FACTOR: Chronic | ICD-10-CM

## 2020-03-09 RX ORDER — DULOXETIN HYDROCHLORIDE 60 MG/1
CAPSULE, DELAYED RELEASE ORAL
Qty: 180 CAPSULE | Refills: 3 | Status: SHIPPED | OUTPATIENT
Start: 2020-03-09 | End: 2020-03-10 | Stop reason: SDUPTHER

## 2020-03-10 ENCOUNTER — OFFICE VISIT (OUTPATIENT)
Dept: RHEUMATOLOGY | Facility: CLINIC | Age: 70
End: 2020-03-10
Payer: MEDICARE

## 2020-03-10 ENCOUNTER — LAB VISIT (OUTPATIENT)
Dept: LAB | Facility: HOSPITAL | Age: 70
End: 2020-03-10
Attending: PHYSICIAN ASSISTANT
Payer: MEDICARE

## 2020-03-10 VITALS
WEIGHT: 229.75 LBS | HEART RATE: 61 BPM | SYSTOLIC BLOOD PRESSURE: 140 MMHG | DIASTOLIC BLOOD PRESSURE: 77 MMHG | BODY MASS INDEX: 34.82 KG/M2 | HEIGHT: 68 IN

## 2020-03-10 DIAGNOSIS — M06.09 RHEUMATOID ARTHRITIS OF MULTIPLE SITES WITH NEGATIVE RHEUMATOID FACTOR: Primary | Chronic | ICD-10-CM

## 2020-03-10 DIAGNOSIS — M47.816 SPONDYLOSIS OF LUMBAR REGION WITHOUT MYELOPATHY OR RADICULOPATHY: Chronic | ICD-10-CM

## 2020-03-10 DIAGNOSIS — D84.9 IMMUNOCOMPROMISED PATIENT: Chronic | ICD-10-CM

## 2020-03-10 DIAGNOSIS — M79.18 CHRONIC MUSCULOSKELETAL PAIN: ICD-10-CM

## 2020-03-10 DIAGNOSIS — G89.29 CHRONIC MUSCULOSKELETAL PAIN: ICD-10-CM

## 2020-03-10 DIAGNOSIS — L40.9 PSORIASIS: Chronic | ICD-10-CM

## 2020-03-10 DIAGNOSIS — Z79.899 HIGH RISK MEDICATION USE: ICD-10-CM

## 2020-03-10 DIAGNOSIS — M06.09 RHEUMATOID ARTHRITIS OF MULTIPLE SITES WITH NEGATIVE RHEUMATOID FACTOR: Chronic | ICD-10-CM

## 2020-03-10 DIAGNOSIS — Z23 NEED FOR PNEUMOCOCCAL VACCINATION: ICD-10-CM

## 2020-03-10 DIAGNOSIS — R74.8 ELEVATED LIVER ENZYMES: ICD-10-CM

## 2020-03-10 DIAGNOSIS — M17.0 PRIMARY OSTEOARTHRITIS OF BOTH KNEES: ICD-10-CM

## 2020-03-10 LAB
ALBUMIN SERPL BCP-MCNC: 3.6 G/DL (ref 3.5–5.2)
ALP SERPL-CCNC: 105 U/L (ref 55–135)
ALT SERPL W/O P-5'-P-CCNC: 10 U/L (ref 10–44)
ANION GAP SERPL CALC-SCNC: 9 MMOL/L (ref 8–16)
AST SERPL-CCNC: 41 U/L (ref 10–40)
BASOPHILS # BLD AUTO: 0.04 K/UL (ref 0–0.2)
BASOPHILS NFR BLD: 0.7 % (ref 0–1.9)
BILIRUB SERPL-MCNC: 0.6 MG/DL (ref 0.1–1)
BUN SERPL-MCNC: 14 MG/DL (ref 8–23)
CALCIUM SERPL-MCNC: 9.2 MG/DL (ref 8.7–10.5)
CHLORIDE SERPL-SCNC: 105 MMOL/L (ref 95–110)
CO2 SERPL-SCNC: 28 MMOL/L (ref 23–29)
CREAT SERPL-MCNC: 1.1 MG/DL (ref 0.5–1.4)
CRP SERPL-MCNC: 3.6 MG/L (ref 0–8.2)
DIFFERENTIAL METHOD: ABNORMAL
EOSINOPHIL # BLD AUTO: 0.2 K/UL (ref 0–0.5)
EOSINOPHIL NFR BLD: 3.2 % (ref 0–8)
ERYTHROCYTE [DISTWIDTH] IN BLOOD BY AUTOMATED COUNT: 12.7 % (ref 11.5–14.5)
ERYTHROCYTE [SEDIMENTATION RATE] IN BLOOD BY WESTERGREN METHOD: 13 MM/HR (ref 0–23)
EST. GFR  (AFRICAN AMERICAN): >60 ML/MIN/1.73 M^2
EST. GFR  (NON AFRICAN AMERICAN): >60 ML/MIN/1.73 M^2
GLUCOSE SERPL-MCNC: 126 MG/DL (ref 70–110)
HCT VFR BLD AUTO: 40.6 % (ref 40–54)
HGB BLD-MCNC: 13.3 G/DL (ref 14–18)
IMM GRANULOCYTES # BLD AUTO: 0.09 K/UL (ref 0–0.04)
IMM GRANULOCYTES NFR BLD AUTO: 1.7 % (ref 0–0.5)
LYMPHOCYTES # BLD AUTO: 1.4 K/UL (ref 1–4.8)
LYMPHOCYTES NFR BLD: 25.6 % (ref 18–48)
MCH RBC QN AUTO: 30.2 PG (ref 27–31)
MCHC RBC AUTO-ENTMCNC: 32.8 G/DL (ref 32–36)
MCV RBC AUTO: 92 FL (ref 82–98)
MONOCYTES # BLD AUTO: 0.6 K/UL (ref 0.3–1)
MONOCYTES NFR BLD: 11 % (ref 4–15)
NEUTROPHILS # BLD AUTO: 3.2 K/UL (ref 1.8–7.7)
NEUTROPHILS NFR BLD: 59.5 % (ref 38–73)
NRBC BLD-RTO: 0 /100 WBC
PLATELET # BLD AUTO: 190 K/UL (ref 150–350)
PMV BLD AUTO: 11 FL (ref 9.2–12.9)
POTASSIUM SERPL-SCNC: 4.6 MMOL/L (ref 3.5–5.1)
PROT SERPL-MCNC: 7.3 G/DL (ref 6–8.4)
RBC # BLD AUTO: 4.41 M/UL (ref 4.6–6.2)
SODIUM SERPL-SCNC: 142 MMOL/L (ref 136–145)
WBC # BLD AUTO: 5.36 K/UL (ref 3.9–12.7)

## 2020-03-10 PROCEDURE — 99214 OFFICE O/P EST MOD 30 MIN: CPT | Mod: S$PBB,,, | Performed by: PHYSICIAN ASSISTANT

## 2020-03-10 PROCEDURE — 85025 COMPLETE CBC W/AUTO DIFF WBC: CPT

## 2020-03-10 PROCEDURE — G0009 ADMIN PNEUMOCOCCAL VACCINE: HCPCS | Mod: PBBFAC

## 2020-03-10 PROCEDURE — 36415 COLL VENOUS BLD VENIPUNCTURE: CPT

## 2020-03-10 PROCEDURE — 99214 PR OFFICE/OUTPT VISIT, EST, LEVL IV, 30-39 MIN: ICD-10-PCS | Mod: S$PBB,,, | Performed by: PHYSICIAN ASSISTANT

## 2020-03-10 PROCEDURE — 99999 PR PBB SHADOW E&M-EST. PATIENT-LVL V: CPT | Mod: PBBFAC,,, | Performed by: PHYSICIAN ASSISTANT

## 2020-03-10 PROCEDURE — 80053 COMPREHEN METABOLIC PANEL: CPT

## 2020-03-10 PROCEDURE — 99999 PR PBB SHADOW E&M-EST. PATIENT-LVL V: ICD-10-PCS | Mod: PBBFAC,,, | Performed by: PHYSICIAN ASSISTANT

## 2020-03-10 PROCEDURE — 85652 RBC SED RATE AUTOMATED: CPT

## 2020-03-10 PROCEDURE — 99215 OFFICE O/P EST HI 40 MIN: CPT | Mod: PBBFAC | Performed by: PHYSICIAN ASSISTANT

## 2020-03-10 PROCEDURE — 86140 C-REACTIVE PROTEIN: CPT

## 2020-03-10 RX ORDER — DULOXETIN HYDROCHLORIDE 60 MG/1
60 CAPSULE, DELAYED RELEASE ORAL DAILY
Qty: 90 CAPSULE | Refills: 3 | Status: SHIPPED | OUTPATIENT
Start: 2020-03-10 | End: 2020-06-15

## 2020-03-10 RX ORDER — CLONAZEPAM 1 MG/1
1 TABLET ORAL NIGHTLY
COMMUNITY
Start: 2020-02-06

## 2020-03-10 ASSESSMENT — ROUTINE ASSESSMENT OF PATIENT INDEX DATA (RAPID3): MDHAQ FUNCTION SCORE: 1.3

## 2020-03-10 NOTE — PROGRESS NOTES
Subjective:       Patient ID: Thomas Sarkar is a 69 y.o. male.    Chief Complaint: Rheumatoid Arthritis      Thmoas is back today for rheumatology followup.     We have followed thomas for seronegative RA with psoriasis overlap and chronic unspecified Colitis. He is followed by GI. His gi issues have been very quiet. He has been treated and failed mtx monotherapy and sulfasalazine. He has failed multiple tnf agents (remicade, enbrel, humira and cimzia), failed xeljanz, actemra caused elevated lft and low plt,   Was on IV Orecnica 1000 mg Q 4 weeks  over 1 year- cost was high so Dr Douglass moved him to Orencia sub Q 125 mg weekly dose; this has been the best control we were able to get    Had accident working in his shop; fractured Right 4th metacarpal bone; no surgery; healing fine  Symptoms are stable ongoing prolonged stiffness in his hands and hips stanley.  his pain level went down from 4/10    Skin doing fair psoriasis comes and goes but under control for most part.      Now on gabapentin and Cymbalta  daily to help some to reduce pain but does not seem to be working as well. No side effects.   His neurologist wants him to cut back on Cymbalta to once daily     recenlty dx with parkinson's- on sinemet seeing DR Espinosa at [facility] [place]     Had his flu shot done                 He complains of joint swelling. Associated symptoms include myalgias. Pertinent negatives include no dysuria, fatigue, fever or trouble swallowing.     His past medical history is significant for osteoarthritis.   Psoriasis   Associated symptoms include arthralgias, joint swelling and myalgias. Pertinent negatives include no abdominal pain, chest pain, chills, congestion, coughing, fatigue, fever, nausea, numbness, rash, vomiting or weakness.   Osteoarthritis   Associated symptoms include arthralgias, joint swelling and myalgias. Pertinent negatives include no abdominal pain, chest pain, chills, congestion, coughing, fatigue, fever, nausea,  "numbness, rash, vomiting or weakness.     Review of Systems   Constitutional: Negative.  Negative for chills, fatigue and fever.   HENT: Negative.  Negative for congestion, mouth sores and trouble swallowing.    Eyes: Negative.  Negative for photophobia, redness and visual disturbance.   Respiratory: Negative.  Negative for cough, shortness of breath and wheezing.    Cardiovascular: Negative.  Negative for chest pain and leg swelling.   Gastrointestinal: Negative.  Negative for abdominal distention, abdominal pain, anal bleeding, blood in stool, constipation, diarrhea, nausea, rectal pain and vomiting.   Genitourinary: Negative.  Negative for dysuria, flank pain, frequency and urgency.   Musculoskeletal: Positive for arthralgias, joint swelling and myalgias. Negative for back pain and gait problem.   Skin: Negative for color change, pallor and rash.   Neurological: Negative.  Negative for dizziness, weakness and numbness.         Objective:     BP (!) 140/77   Pulse 61   Ht 5' 8" (1.727 m)   Wt 104.2 kg (229 lb 11.5 oz)   BMI 34.93 kg/m²      Physical Exam   Constitutional: He is oriented to person, place, and time and well-developed, well-nourished, and in no distress. No distress.   HENT:   Head: Normocephalic and atraumatic.   Right Ear: External ear normal.   Left Ear: External ear normal.   Mouth/Throat: No oropharyngeal exudate.   Eyes: Conjunctivae and EOM are normal. Pupils are equal, round, and reactive to light. No scleral icterus.   Neck: Neck supple. No thyromegaly present.   Cardiovascular: Normal rate, regular rhythm and normal heart sounds.    No murmur heard.  Pulmonary/Chest: Effort normal and breath sounds normal. No respiratory distress.   Abdominal: Soft. Bowel sounds are normal.   Lymphadenopathy:     He has no cervical adenopathy.   Neurological: He is alert and oriented to person, place, and time. No cranial nerve deficit. He exhibits normal muscle tone. Gait normal. Coordination normal. "   Skin: Skin is warm and dry. No rash noted.          Musculoskeletal: He exhibits edema and tenderness.   Puffiness to several fingers and pip joints with tenderness noted on exam  Decreased range of motion pip joints   Not as much tenderness noted  Swelling improved                 Immunization History   Administered Date(s) Administered    Influenza 10/02/2018    Influenza - High Dose - PF (65 years and older) 10/30/2014, 10/28/2015, 10/11/2016    Influenza - Quadrivalent 10/30/2017    Influenza - Quadrivalent - PF (6 months and older) 10/02/2019    Influenza Split 11/07/2013    PPD Test 08/07/2012, 10/28/2015    Pneumococcal Conjugate - 13 Valent 11/14/2013    Pneumococcal Polysaccharide - 23 Valent 06/11/2014, 03/10/2020    Tdap 11/07/2013    Zoster 11/21/2013         Recent Results (from the past 1008 hour(s))   C-reactive protein    Collection Time: 03/10/20  7:39 AM   Result Value Ref Range    CRP 3.6 0.0 - 8.2 mg/L   Comprehensive metabolic panel    Collection Time: 03/10/20  7:39 AM   Result Value Ref Range    Sodium 142 136 - 145 mmol/L    Potassium 4.6 3.5 - 5.1 mmol/L    Chloride 105 95 - 110 mmol/L    CO2 28 23 - 29 mmol/L    Glucose 126 (H) 70 - 110 mg/dL    BUN, Bld 14 8 - 23 mg/dL    Creatinine 1.1 0.5 - 1.4 mg/dL    Calcium 9.2 8.7 - 10.5 mg/dL    Total Protein 7.3 6.0 - 8.4 g/dL    Albumin 3.6 3.5 - 5.2 g/dL    Total Bilirubin 0.6 0.1 - 1.0 mg/dL    Alkaline Phosphatase 105 55 - 135 U/L    AST 41 (H) 10 - 40 U/L    ALT 10 10 - 44 U/L    Anion Gap 9 8 - 16 mmol/L    eGFR if African American >60 >60 mL/min/1.73 m^2    eGFR if non African American >60 >60 mL/min/1.73 m^2   CBC auto differential    Collection Time: 03/10/20  7:39 AM   Result Value Ref Range    WBC 5.36 3.90 - 12.70 K/uL    RBC 4.41 (L) 4.60 - 6.20 M/uL    Hemoglobin 13.3 (L) 14.0 - 18.0 g/dL    Hematocrit 40.6 40.0 - 54.0 %    Mean Corpuscular Volume 92 82 - 98 fL    Mean Corpuscular Hemoglobin 30.2 27.0 - 31.0 pg    Mean  Corpuscular Hemoglobin Conc 32.8 32.0 - 36.0 g/dL    RDW 12.7 11.5 - 14.5 %    Platelets 190 150 - 350 K/uL    MPV 11.0 9.2 - 12.9 fL    Immature Granulocytes 1.7 (H) 0.0 - 0.5 %    Gran # (ANC) 3.2 1.8 - 7.7 K/uL    Immature Grans (Abs) 0.09 (H) 0.00 - 0.04 K/uL    Lymph # 1.4 1.0 - 4.8 K/uL    Mono # 0.6 0.3 - 1.0 K/uL    Eos # 0.2 0.0 - 0.5 K/uL    Baso # 0.04 0.00 - 0.20 K/uL    nRBC 0 0 /100 WBC    Gran% 59.5 38.0 - 73.0 %    Lymph% 25.6 18.0 - 48.0 %    Mono% 11.0 4.0 - 15.0 %    Eosinophil% 3.2 0.0 - 8.0 %    Basophil% 0.7 0.0 - 1.9 %    Differential Method Automated           Ref. Range 5/14/2018 07:54 10/1/2018 12:00   Hep B Core Total Ab Unknown  Negative   Hep B S Ab Unknown  Negative   Hepatitis B Surface Ag Unknown  Negative   Hepatitis C Ab Unknown  Negative   Mitogen - Nil Latest Ref Range: See text IU/mL 7.587    NIL Latest Ref Range: See text IU/mL 0.124    TB Antigen Latest Ref Range: See text IU/mL 0.111    TB Antigen - Nil Latest Ref Range: See Text IU/mL -0.013    TB Gold Unknown Negative          Assessment:       1. Rheumatoid arthritis of multiple sites with negative rheumatoid factor    2. High risk medication use    3. Immunocompromised patient    4. Psoriasis    5. Elevated liver enzymes    6. Chronic musculoskeletal pain    7. Primary osteoarthritis of both knees    8. Spondylosis of lumbar region without myelopathy or radiculopathy    9. Need for pneumococcal vaccination            1.  Seronegative rheumatoid arthritis -  Stable on Orenica monotherapy- mod  improvement in overall pain but still ongoing activity    still mod   PAULINO-28 (CRP): 3.84 (Moderate disease activity)  cdai 16, RENEE 1.3  Will have to be content with mild/mod activity for now      Had elevated lft with actemra/mtx/crestor- much better off all these agents   GI work up fatty liver- GI will follow    Will remain off mtx and crestor for now  Watch diet and loose weight  Follow lft closely     Who has failed mtx  monotheraoy, failed Enbrel, Remicade, Humira, cimzia- IV Orencia 1000 mg every 4,  Orencia 125 mg SC weekly injection, and xeljanz along with mtx 20 mg weekly      2.  Skin psoriasis stable - has prn topical- no scalp rash today     3.  Chronic unspecified colitis  - not UC- Stable    4.  Osteoarthritis of both knees    5.  Vaccines up to date -had yearly flu vaccine-needs pneumovax for age >65 dose     6.  Medication Monitoring- no current issues, no evidence of toxicity    7.  Immunocompromised no issues with recurrent infections  Tb and hep studies utd 2018      8.  Chronic pain on gabapentin 600 mg tid and cymbalta 120 mg/d- ongoing mild daily pain     9. Recent dx parkinsons on medication- seeing neurology      Plan:       Orders Placed This Encounter   Procedures    Pneumococcal Polysaccharide Vaccine (23 Valent) (SQ/IM)   has not had his dose for > age 65- will give today     When exacerbation occurs can change his  Orencia 125 mg injection go Q 5 days then back to Q 7 days when doing better      Remain off mtx for now  Remain off crestor for now    Watch diet   Loose weight  Watch lft and renal function     Maintain hydration  F/w with urology     Next year if lft stabilized then consider adding back low dose crestor with cards  Will keep ra trt monotherapy     Avoid steroid with his gluacoma    C/w gabapentin to 300 mg am and mid day, 600 mg at night  On days he has more pain can go up to 600 mg tid    Cut back on  Cymbalta to  60 mg once  daily     Repeat kaushik hand and foot X-rays in 1.5-2 years    rtc 16 weeks with labs (cbc,cmp, esr, crp)-1 wk prior       call with any questions, changes, or concerns

## 2020-04-18 DIAGNOSIS — M06.09 RHEUMATOID ARTHRITIS OF MULTIPLE SITES WITH NEGATIVE RHEUMATOID FACTOR: Chronic | ICD-10-CM

## 2020-04-18 DIAGNOSIS — M47.816 SPONDYLOSIS OF LUMBAR REGION WITHOUT MYELOPATHY OR RADICULOPATHY: Chronic | ICD-10-CM

## 2020-04-18 DIAGNOSIS — M17.0 PRIMARY OSTEOARTHRITIS OF BOTH KNEES: ICD-10-CM

## 2020-04-19 RX ORDER — GABAPENTIN 300 MG/1
CAPSULE ORAL
Qty: 540 CAPSULE | Refills: 3 | Status: SHIPPED | OUTPATIENT
Start: 2020-04-19 | End: 2020-11-10 | Stop reason: DRUGHIGH

## 2020-04-20 ENCOUNTER — PATIENT MESSAGE (OUTPATIENT)
Dept: GASTROENTEROLOGY | Facility: CLINIC | Age: 70
End: 2020-04-20

## 2020-04-20 DIAGNOSIS — R16.0 HEPATOMEGALY: ICD-10-CM

## 2020-04-20 DIAGNOSIS — K76.0 FATTY LIVER: Primary | ICD-10-CM

## 2020-04-21 NOTE — TELEPHONE ENCOUNTER
As long as the symptoms have subsided, we can hold off on any investigation at this time.   He is due for labs and imaging with an appt in June-July.

## 2020-04-23 ENCOUNTER — DOCUMENTATION ONLY (OUTPATIENT)
Dept: RHEUMATOLOGY | Facility: CLINIC | Age: 70
End: 2020-04-23

## 2020-07-08 ENCOUNTER — TELEPHONE (OUTPATIENT)
Dept: RADIOLOGY | Facility: HOSPITAL | Age: 70
End: 2020-07-08

## 2020-07-09 ENCOUNTER — OFFICE VISIT (OUTPATIENT)
Dept: RHEUMATOLOGY | Facility: CLINIC | Age: 70
End: 2020-07-09
Payer: MEDICARE

## 2020-07-09 ENCOUNTER — HOSPITAL ENCOUNTER (OUTPATIENT)
Dept: RADIOLOGY | Facility: HOSPITAL | Age: 70
Discharge: HOME | End: 2020-07-09
Attending: NURSE PRACTITIONER
Payer: MEDICARE

## 2020-07-09 VITALS
DIASTOLIC BLOOD PRESSURE: 71 MMHG | SYSTOLIC BLOOD PRESSURE: 126 MMHG | BODY MASS INDEX: 34.33 KG/M2 | HEART RATE: 61 BPM | WEIGHT: 218.69 LBS | HEIGHT: 67 IN

## 2020-07-09 DIAGNOSIS — Z11.1 SCREENING-PULMONARY TB: ICD-10-CM

## 2020-07-09 DIAGNOSIS — R53.82 CHRONIC FATIGUE: ICD-10-CM

## 2020-07-09 DIAGNOSIS — M17.0 PRIMARY OSTEOARTHRITIS OF BOTH KNEES: ICD-10-CM

## 2020-07-09 DIAGNOSIS — K76.0 FATTY LIVER: ICD-10-CM

## 2020-07-09 DIAGNOSIS — Z79.899 HIGH RISK MEDICATION USE: ICD-10-CM

## 2020-07-09 DIAGNOSIS — L40.9 PSORIASIS: Chronic | ICD-10-CM

## 2020-07-09 DIAGNOSIS — M06.09 RHEUMATOID ARTHRITIS OF MULTIPLE SITES WITH NEGATIVE RHEUMATOID FACTOR: Primary | Chronic | ICD-10-CM

## 2020-07-09 DIAGNOSIS — M79.18 CHRONIC MUSCULOSKELETAL PAIN: ICD-10-CM

## 2020-07-09 DIAGNOSIS — D84.9 IMMUNOCOMPROMISED PATIENT: Chronic | ICD-10-CM

## 2020-07-09 DIAGNOSIS — R16.0 HEPATOMEGALY: ICD-10-CM

## 2020-07-09 DIAGNOSIS — G89.29 CHRONIC MUSCULOSKELETAL PAIN: ICD-10-CM

## 2020-07-09 DIAGNOSIS — M70.52 PATELLAR BURSITIS OF LEFT KNEE: ICD-10-CM

## 2020-07-09 PROCEDURE — 99214 OFFICE O/P EST MOD 30 MIN: CPT | Mod: 25,S$PBB,, | Performed by: PHYSICIAN ASSISTANT

## 2020-07-09 PROCEDURE — 76700 US EXAM ABDOM COMPLETE: CPT | Mod: 26,,, | Performed by: RADIOLOGY

## 2020-07-09 PROCEDURE — 20610 DRAIN/INJ JOINT/BURSA W/O US: CPT | Mod: PBBFAC | Performed by: PHYSICIAN ASSISTANT

## 2020-07-09 PROCEDURE — 99214 PR OFFICE/OUTPT VISIT, EST, LEVL IV, 30-39 MIN: ICD-10-PCS | Mod: 25,S$PBB,, | Performed by: PHYSICIAN ASSISTANT

## 2020-07-09 PROCEDURE — 20610 DRAIN/INJ JOINT/BURSA W/O US: CPT | Mod: S$PBB,LT,, | Performed by: PHYSICIAN ASSISTANT

## 2020-07-09 PROCEDURE — 99999 PR PBB SHADOW E&M-EST. PATIENT-LVL IV: ICD-10-PCS | Mod: PBBFAC,,, | Performed by: PHYSICIAN ASSISTANT

## 2020-07-09 PROCEDURE — 99214 OFFICE O/P EST MOD 30 MIN: CPT | Mod: PBBFAC | Performed by: PHYSICIAN ASSISTANT

## 2020-07-09 PROCEDURE — 76700 US ABDOMEN COMPLETE: ICD-10-PCS | Mod: 26,,, | Performed by: RADIOLOGY

## 2020-07-09 PROCEDURE — 99999 PR PBB SHADOW E&M-EST. PATIENT-LVL IV: CPT | Mod: PBBFAC,,, | Performed by: PHYSICIAN ASSISTANT

## 2020-07-09 PROCEDURE — 76700 US EXAM ABDOM COMPLETE: CPT | Mod: TC

## 2020-07-09 PROCEDURE — 20610 LARGE JOINT ASPIRATION/INJECTION: L PATELLAR BURSA: ICD-10-PCS | Mod: S$PBB,LT,, | Performed by: PHYSICIAN ASSISTANT

## 2020-07-09 ASSESSMENT — ROUTINE ASSESSMENT OF PATIENT INDEX DATA (RAPID3): MDHAQ FUNCTION SCORE: 1.4

## 2020-07-09 NOTE — PROGRESS NOTES
Subjective:       Patient ID: Thomas Sarkar is a 69 y.o. male.    Chief Complaint: Rheumatoid Arthritis and Joint Swelling (left knee )      Thomas is back today for rheumatology followup.     We have followed thomas for seronegative RA with psoriasis overlap and chronic unspecified Colitis. He is followed by GI. His gi issues have been very quiet. He has been treated and failed mtx monotherapy and sulfasalazine. He has failed multiple tnf agents (remicade, enbrel, humira and cimzia), failed xeljanz, actemra caused elevated lft and low plt,   Was on IV Orecnica 1000 mg Q 4 weeks  over 1 year- cost was high so Dr Douglass moved him to Orencia sub Q 125 mg weekly dose; this has been the best control we were able to get    Overall doing the same; hip, hand and back pain  Keeps busy working in his shop and yard  Left knee bursa swelling; no pain   Today pain level 3/10    Skin doing fair psoriasis comes and goes but under control for most part.      Now on gabapentin and Cymbalta  daily to help some to reduce pain but does not seem to be working as well. No side effects.   His neurologist wants him to cut back on Cymbalta to once daily     recenlty dx with parkinson's- on sinemet seeing DR Espinosa at [facility] [place]       No fever or infections  No cough or sob  No Chest pain  No N/V/D  No chills or rigors  No sick contacts                     He complains of joint swelling. Associated symptoms include myalgias. Pertinent negatives include no dysuria, fatigue, fever or trouble swallowing.     His past medical history is significant for osteoarthritis.   Psoriasis  Associated symptoms include arthralgias, joint swelling and myalgias. Pertinent negatives include no abdominal pain, chest pain, chills, congestion, coughing, fatigue, fever, nausea, numbness, rash, vomiting or weakness.   Osteoarthritis  Associated symptoms include arthralgias, joint swelling and myalgias. Pertinent negatives include no abdominal pain, chest pain,  "chills, congestion, coughing, fatigue, fever, nausea, numbness, rash, vomiting or weakness.     Review of Systems   Constitutional: Negative.  Negative for chills, fatigue and fever.   HENT: Negative.  Negative for congestion, mouth sores and trouble swallowing.    Eyes: Negative.  Negative for photophobia, redness and visual disturbance.   Respiratory: Negative.  Negative for cough, shortness of breath and wheezing.    Cardiovascular: Negative.  Negative for chest pain and leg swelling.   Gastrointestinal: Negative.  Negative for abdominal distention, abdominal pain, anal bleeding, blood in stool, constipation, diarrhea, nausea, rectal pain and vomiting.   Genitourinary: Negative.  Negative for dysuria, flank pain, frequency and urgency.   Musculoskeletal: Positive for arthralgias, joint swelling and myalgias. Negative for back pain and gait problem.   Skin: Negative for color change, pallor and rash.   Neurological: Negative.  Negative for dizziness, weakness and numbness.         Objective:     /71   Pulse 61   Ht 5' 7" (1.702 m)   Wt 99.2 kg (218 lb 11.1 oz)   BMI 34.25 kg/m²      Physical Exam   Constitutional: He is oriented to person, place, and time and well-developed, well-nourished, and in no distress. No distress.   HENT:   Head: Normocephalic and atraumatic.   Right Ear: External ear normal.   Left Ear: External ear normal.   Mouth/Throat: No oropharyngeal exudate.   Eyes: Conjunctivae and EOM are normal. Pupils are equal, round, and reactive to light. No scleral icterus.   Neck: Neck supple. No thyromegaly present.   Cardiovascular: Normal rate, regular rhythm and normal heart sounds.    No murmur heard.  Pulmonary/Chest: Effort normal and breath sounds normal. No respiratory distress.   Abdominal: Soft. Bowel sounds are normal.   Lymphadenopathy:     He has no cervical adenopathy.   Neurological: He is alert and oriented to person, place, and time. No cranial nerve deficit. He exhibits normal " muscle tone. Gait normal. Coordination normal.   Skin: Skin is warm and dry. No rash noted.          Musculoskeletal: Tenderness and edema present.      Comments: Puffiness to several fingers and pip joints with tenderness noted on exam  Decreased range of motion pip joints   Not as much tenderness noted  Swelling improved   Left knee pre-patellar bursitis; no ttp                 Immunization History   Administered Date(s) Administered    Influenza 10/02/2018    Influenza - High Dose - PF (65 years and older) 10/30/2014, 10/28/2015, 10/11/2016    Influenza - Quadrivalent 10/30/2017    Influenza - Quadrivalent - PF (6 months and older) 10/02/2019    Influenza Split 11/07/2013    PPD Test 08/07/2012, 10/28/2015    Pneumococcal Conjugate - 13 Valent 11/14/2013    Pneumococcal Polysaccharide - 23 Valent 06/11/2014, 03/10/2020    Tdap 11/07/2013    Zoster 11/21/2013         Recent Results (from the past 1008 hour(s))   C-reactive protein    Collection Time: 07/09/20  7:37 AM   Result Value Ref Range    CRP 7.3 0.0 - 8.2 mg/L   Comprehensive metabolic panel    Collection Time: 07/09/20  7:37 AM   Result Value Ref Range    Sodium 138 136 - 145 mmol/L    Potassium 4.5 3.5 - 5.1 mmol/L    Chloride 99 95 - 110 mmol/L    CO2 26 23 - 29 mmol/L    Glucose 125 (H) 70 - 110 mg/dL    BUN, Bld 27 (H) 8 - 23 mg/dL    Creatinine 1.5 (H) 0.5 - 1.4 mg/dL    Calcium 9.6 8.7 - 10.5 mg/dL    Total Protein 7.9 6.0 - 8.4 g/dL    Albumin 3.8 3.5 - 5.2 g/dL    Total Bilirubin 0.8 0.1 - 1.0 mg/dL    Alkaline Phosphatase 102 55 - 135 U/L    AST 32 10 - 40 U/L    ALT 13 10 - 44 U/L    Anion Gap 13 8 - 16 mmol/L    eGFR if African American 54 (A) >60 mL/min/1.73 m^2    eGFR if non African American 47 (A) >60 mL/min/1.73 m^2   CBC auto differential    Collection Time: 07/09/20  7:37 AM   Result Value Ref Range    WBC 6.44 3.90 - 12.70 K/uL    RBC 4.50 (L) 4.60 - 6.20 M/uL    Hemoglobin 13.9 (L) 14.0 - 18.0 g/dL    Hematocrit 41.7 40.0 -  54.0 %    Mean Corpuscular Volume 93 82 - 98 fL    Mean Corpuscular Hemoglobin 30.9 27.0 - 31.0 pg    Mean Corpuscular Hemoglobin Conc 33.3 32.0 - 36.0 g/dL    RDW 13.2 11.5 - 14.5 %    Platelets 218 150 - 350 K/uL    MPV 10.9 9.2 - 12.9 fL    Immature Granulocytes 0.8 (H) 0.0 - 0.5 %    Gran # (ANC) 3.9 1.8 - 7.7 K/uL    Immature Grans (Abs) 0.05 (H) 0.00 - 0.04 K/uL    Lymph # 1.7 1.0 - 4.8 K/uL    Mono # 0.8 0.3 - 1.0 K/uL    Eos # 0.1 0.0 - 0.5 K/uL    Baso # 0.04 0.00 - 0.20 K/uL    nRBC 0 0 /100 WBC    Gran% 59.8 38.0 - 73.0 %    Lymph% 26.1 18.0 - 48.0 %    Mono% 11.8 4.0 - 15.0 %    Eosinophil% 1.7 0.0 - 8.0 %    Basophil% 0.6 0.0 - 1.9 %    Differential Method Automated        Assessment:       1. Rheumatoid arthritis of multiple sites with negative rheumatoid factor    2. Psoriasis    3. Immunocompromised patient    4. Primary osteoarthritis of both knees    5. Chronic musculoskeletal pain    6. High risk medication use    7. Screening-pulmonary TB    8. Chronic fatigue            1.  Seronegative rheumatoid arthritis -  Stable on Orenica monotherapy- mod  improvement in overall pain but still ongoing activity    still mod   PAULINO-28 (CRP): 3.67 (Moderate disease activity)  cdai 14, RENEE 1.4  Will have to be content with mild/mod activity for now      Had elevated lft with actemra/mtx/crestor- much better off all these agents   GI work up fatty liver- GI will follow    Will remain off mtx and crestor for now  Watch diet and loose weight  Follow lft closely     Who has failed mtx monotheraoy, failed Enbrel, Remicade, Humira, cimzia- IV Orencia 1000 mg every 4,  Orencia 125 mg SC weekly injection, and xeljanz along with mtx 20 mg weekly      2.  Skin psoriasis stable - has prn topical- no scalp rash today     3.  Chronic unspecified colitis  - not UC- Stable    4.  Osteoarthritis of both knees    5.  Vaccines up to date -had yearly flu vaccine-needs pneumovax for age >65 dose     6.  Medication Monitoring- no  current issues, no evidence of toxicity    7.  Immunocompromised no issues with recurrent infections  Tb and hep studies utd 2018      8.  Chronic pain on gabapentin 600 mg tid and cymbalta 120 mg/d- ongoing mild daily pain     9. Recent dx parkinsons on medication- seeing neurology     10. RI- CKD stage III- renal stones- sees urology  Passed recent renal stone  Cancelled renal biopsy during covid pandemic  Needs to get back to urology     11 left prepatellar bursitis- mod-large       Plan:       Orders Placed This Encounter   Procedures    Hepatitis Panel, Acute    Quantiferon Gold TB       When exacerbation occurs can change his  Orencia 125 mg injection go Q 5 days then back to Q 7 days when doing better    Remain off mtx for now  Remain off crestor for now    Watch diet   Loose weight  Watch lft and renal function     Maintain hydration  F/w with urology stanley in lieu of worsening renal function     Will keep ra trt monotherapy     Avoid steroid with his gluacoma    C/w gabapentin to 300 mg am and mid day, 600 mg at night  On days he has more pain can go up to 600 mg tid    C/w  Cymbalta to  60 mg once  daily     Repeat kaushik hand and foot X-rays in 1.5-2 years    Asp left prepatellar bursa  Large Joint Aspiration/Injection: L patellar bursa    Date/Time: 7/9/2020 8:30 AM  Performed by: Juanita Beckham PA-C  Authorized by: Juanita Beckham PA-C     Indications:  Joint swelling and diagnostic evaluation  Site marked: the procedure site was marked    Timeout: prior to procedure the correct patient, procedure, and site was verified    Prep: patient was prepped and draped in usual sterile fashion    Anesthesia:  Local infiltration  Local anesthetic:  Lidocaine 2% without epinephrine    Details:  Needle Size:  18 G  Approach:  Anterior  Location:  Knee  Site:  L patellar bursa  Patient tolerance:  Patient tolerated the procedure well with no immediate complications     Procedure note: The left knee  was prepared with  sterile prep.  The skin was anesthetized with 1% ethyl chloride.  The left patellar bursa  was injected  with  1.0 mL of 2% lidocaine to anesthetize the bursa  using 18 gauge needle the bursa was accessed and 3 mL of blood tinged fluid was aspirated. The patient tolerated procedure well with no complications. Hemostasis was obtained.  Patient diischarged with icing instructions  Pressure dressing placed             Get copper compression brace and knee pad  Wear compression to keep swelling down and knee pad to help avoid trauma when kneeling  Ice area after activity     rtc 16 weeks with labs (cbc,cmp, esr, crp)-acute hep and tb gold       call with any questions, changes, or concerns

## 2020-07-16 ENCOUNTER — DOCUMENTATION ONLY (OUTPATIENT)
Dept: RHEUMATOLOGY | Facility: CLINIC | Age: 70
End: 2020-07-16

## 2020-07-16 NOTE — PROGRESS NOTES
Received documentation that patient is eligible to receive co pay assistance for Orencia through the [place]rencia [place] Program

## 2020-11-03 ENCOUNTER — LAB VISIT (OUTPATIENT)
Dept: LAB | Facility: HOSPITAL | Age: 70
End: 2020-11-03
Attending: PHYSICIAN ASSISTANT
Payer: MEDICARE

## 2020-11-03 DIAGNOSIS — Z11.1 SCREENING-PULMONARY TB: ICD-10-CM

## 2020-11-03 DIAGNOSIS — Z79.899 HIGH RISK MEDICATION USE: ICD-10-CM

## 2020-11-03 DIAGNOSIS — M06.09 RHEUMATOID ARTHRITIS OF MULTIPLE SITES WITH NEGATIVE RHEUMATOID FACTOR: Chronic | ICD-10-CM

## 2020-11-03 PROCEDURE — 86480 TB TEST CELL IMMUN MEASURE: CPT

## 2020-11-05 LAB
GAMMA INTERFERON BACKGROUND BLD IA-ACNC: 0.02 IU/ML
M TB IFN-G CD4+ BCKGRND COR BLD-ACNC: 0.01 IU/ML
MITOGEN IGNF BCKGRD COR BLD-ACNC: >10 IU/ML
TB GOLD PLUS: NEGATIVE
TB2 - NIL: 0.02 IU/ML

## 2020-11-10 ENCOUNTER — OFFICE VISIT (OUTPATIENT)
Dept: RHEUMATOLOGY | Facility: CLINIC | Age: 70
End: 2020-11-10
Payer: MEDICARE

## 2020-11-10 VITALS
DIASTOLIC BLOOD PRESSURE: 73 MMHG | WEIGHT: 223.13 LBS | BODY MASS INDEX: 35.02 KG/M2 | HEART RATE: 63 BPM | HEIGHT: 67 IN | SYSTOLIC BLOOD PRESSURE: 136 MMHG

## 2020-11-10 DIAGNOSIS — G89.29 CHRONIC MUSCULOSKELETAL PAIN: ICD-10-CM

## 2020-11-10 DIAGNOSIS — D84.9 IMMUNOCOMPROMISED PATIENT: Chronic | ICD-10-CM

## 2020-11-10 DIAGNOSIS — M79.2 CHRONIC NEUROPATHIC PAIN: ICD-10-CM

## 2020-11-10 DIAGNOSIS — R16.0 HEPATOMEGALY: ICD-10-CM

## 2020-11-10 DIAGNOSIS — M79.18 CHRONIC MUSCULOSKELETAL PAIN: ICD-10-CM

## 2020-11-10 DIAGNOSIS — M47.816 SPONDYLOSIS OF LUMBAR REGION WITHOUT MYELOPATHY OR RADICULOPATHY: Chronic | ICD-10-CM

## 2020-11-10 DIAGNOSIS — M06.09 RHEUMATOID ARTHRITIS OF MULTIPLE SITES WITH NEGATIVE RHEUMATOID FACTOR: Primary | Chronic | ICD-10-CM

## 2020-11-10 DIAGNOSIS — M17.0 PRIMARY OSTEOARTHRITIS OF BOTH KNEES: ICD-10-CM

## 2020-11-10 DIAGNOSIS — G89.29 CHRONIC NEUROPATHIC PAIN: ICD-10-CM

## 2020-11-10 DIAGNOSIS — Z79.899 HIGH RISK MEDICATION USE: ICD-10-CM

## 2020-11-10 PROCEDURE — 99214 PR OFFICE/OUTPT VISIT, EST, LEVL IV, 30-39 MIN: ICD-10-PCS | Mod: S$PBB,,, | Performed by: PHYSICIAN ASSISTANT

## 2020-11-10 PROCEDURE — 99214 OFFICE O/P EST MOD 30 MIN: CPT | Mod: PBBFAC,25 | Performed by: PHYSICIAN ASSISTANT

## 2020-11-10 PROCEDURE — 99999 PR PBB SHADOW E&M-EST. PATIENT-LVL IV: CPT | Mod: PBBFAC,,, | Performed by: PHYSICIAN ASSISTANT

## 2020-11-10 PROCEDURE — 99214 OFFICE O/P EST MOD 30 MIN: CPT | Mod: S$PBB,,, | Performed by: PHYSICIAN ASSISTANT

## 2020-11-10 PROCEDURE — G0008 ADMIN INFLUENZA VIRUS VAC: HCPCS | Mod: PBBFAC

## 2020-11-10 PROCEDURE — 90694 VACC AIIV4 NO PRSRV 0.5ML IM: CPT | Mod: PBBFAC

## 2020-11-10 PROCEDURE — 99999 PR PBB SHADOW E&M-EST. PATIENT-LVL IV: ICD-10-PCS | Mod: PBBFAC,,, | Performed by: PHYSICIAN ASSISTANT

## 2020-11-10 RX ORDER — GABAPENTIN 800 MG/1
800 TABLET ORAL 3 TIMES DAILY
Qty: 270 TABLET | Refills: 3 | Status: SHIPPED | OUTPATIENT
Start: 2020-11-10 | End: 2022-01-20 | Stop reason: SDUPTHER

## 2020-11-10 ASSESSMENT — CLINICAL DISEASE ACTIVITY INDEX (CDAI)
TENDER_JOINTS_COUNT: 14
TOTAL_SCORE: 31
PHYSICIAN_ASSESSMENT: 6
PATIENT_ASSESSMENT: 6
SWOLLEN_JOINTS_COUNT: 5

## 2020-11-10 ASSESSMENT — ROUTINE ASSESSMENT OF PATIENT INDEX DATA (RAPID3): MDHAQ FUNCTION SCORE: 1.6

## 2020-11-10 NOTE — PROGRESS NOTES
"  Subjective:       [facility], [place]     Patient ID: Thomas Sarkar is a 70 y.o. male.    Chief Complaint: Disease Management (RA)    Thomas is back today for rheumatology followup.     We have followed thomas for seronegative RA with psoriasis overlap and chronic unspecified Colitis. He is followed by GI. His gi issues have been very quiet. RA has been refractory to trt He has been treated with  and failed mtx monotherapy and sulfasalazine. He has failed multiple tnf agents (remicade, enbrel, humira and cimzia), failed xeljanz, actemra caused elevated lft and low plt,   Was on IV Orencia 1000 mg Q 4 weeks  over 1 year- cost was high so Dr Douglass moved him to Orencia sub Q 125 mg weekly dose; this has been the best control we were able to get    He has been stable at mod DA; unable to get to Low DA or remission  The past few months a lot more gen pain  Pain "all over"  Stiffness in his hands lasting all day  Swelling and pain mcp and pip joints  kaushik hip, shoulder and elbow pain but no swelling   Advanced OA kaushik hips; scheduled for Left FAIZAN in 7 days  Then 4 wks later will get cochlear implants    Today pain level rated 9/10  Skin doing fair psoriasis comes and goes but under control for most part.      Now on gabapentin 600 mg tid, and Cymbalta 60 mg Q day; his neurologist does not want him on higher cymbalta deniz to his parkinson's dz.   on sinemet seeing DR Espinosa at [facility]       No fever or infections  No cough or sob  No Chest pain  No N/V/D  No chills or rigors  No sick contacts                     He complains of joint swelling. Associated symptoms include myalgias. Pertinent negatives include no dysuria, fatigue, fever or trouble swallowing.     His past medical history is significant for osteoarthritis.   Psoriasis  Associated symptoms include arthralgias, joint swelling and myalgias. Pertinent negatives include no abdominal pain, chest pain, chills, congestion, " "coughing, fatigue, fever, nausea, numbness, rash, vomiting or weakness.   Osteoarthritis  Associated symptoms include arthralgias, joint swelling and myalgias. Pertinent negatives include no abdominal pain, chest pain, chills, congestion, coughing, fatigue, fever, nausea, numbness, rash, vomiting or weakness.     Review of Systems   Constitutional: Negative.  Negative for chills, fatigue and fever.   HENT: Negative.  Negative for congestion, mouth sores and trouble swallowing.    Eyes: Negative.  Negative for photophobia, redness and visual disturbance.   Respiratory: Negative.  Negative for cough, shortness of breath and wheezing.    Cardiovascular: Negative.  Negative for chest pain and leg swelling.   Gastrointestinal: Negative.  Negative for abdominal distention, abdominal pain, diarrhea, nausea and vomiting.   Genitourinary: Negative.  Negative for dysuria, flank pain, frequency and urgency.   Musculoskeletal: Positive for arthralgias, gait problem, joint swelling and myalgias.   Skin: Negative.  Negative for rash.   Neurological: Negative for dizziness, weakness and numbness.         Objective:   /73   Pulse 63   Ht 5' 7" (1.702 m)   Wt 101.2 kg (223 lb 1.7 oz)   BMI 34.94 kg/m²        Past Medical History:   Diagnosis Date    Acid reflux     Allergy     Arthritis     PSORIATIC    Cataract     CHF (congestive heart failure)     Chronic kidney disease     Diabetes mellitus, type 2     Dry eyes     Glaucoma     SECONDARY TO STEROIDS    Heart murmur     Hyperlipidemia     Myocardial infarction     Parkinson's disease     Rheumatoid arthritis(714.0)     Scalp tenderness     FROM PSORIASIS      Immunization History   Administered Date(s) Administered    Influenza 10/02/2018    Influenza - High Dose - PF (65 years and older) 10/30/2014, 10/28/2015, 10/11/2016    Influenza - Quadrivalent 10/30/2017    Influenza - Quadrivalent - PF *Preferred* (6 months and older) 10/30/2017, 10/02/2018, " 10/02/2019    Influenza Split 11/07/2013    PPD Test 08/07/2012, 10/28/2015    Pneumococcal Conjugate - 13 Valent 11/14/2013    Pneumococcal Polysaccharide - 23 Valent 06/11/2014, 03/10/2020    Tdap 11/07/2013    Zoster 11/21/2013    Zoster Recombinant 05/19/2020          Physical Exam   Constitutional: He is oriented to person, place, and time and well-developed, well-nourished, and in no distress. No distress.   HENT:   Head: Normocephalic and atraumatic.   Right Ear: External ear normal.   Left Ear: External ear normal.   Mouth/Throat: No oropharyngeal exudate.   Eyes: Conjunctivae and EOM are normal. Pupils are equal, round, and reactive to light. No scleral icterus.   Neck: Neck supple. No thyromegaly present.   Cardiovascular: Normal rate, regular rhythm and normal heart sounds.    No murmur heard.  Pulmonary/Chest: Effort normal and breath sounds normal. No respiratory distress.   Abdominal: Soft. Bowel sounds are normal.   Lymphadenopathy:     He has no cervical adenopathy.   Neurological: He is alert and oriented to person, place, and time. No cranial nerve deficit. He exhibits normal muscle tone. Gait normal. Coordination normal.   Skin: Skin is warm and dry.     Musculoskeletal: Tenderness and edema present.      Comments: Diffuse tenderness to palpation w/swelling across several mcp and pip joints  Decreased   Decreased flexion mcp and pip joints    A lot of generalized ttp as well           Recent Results (from the past 336 hour(s))   Sedimentation rate, manual    Collection Time: 11/03/20  7:42 AM   Result Value Ref Range    Sed Rate 9 0 - 23 mm/Hr   C-reactive protein    Collection Time: 11/03/20  7:42 AM   Result Value Ref Range    CRP 2.4 0.0 - 8.2 mg/L   Comprehensive metabolic panel    Collection Time: 11/03/20  7:42 AM   Result Value Ref Range    Sodium 142 136 - 145 mmol/L    Potassium 3.9 3.5 - 5.1 mmol/L    Chloride 107 95 - 110 mmol/L    CO2 24 23 - 29 mmol/L    Glucose 106 70 -  110 mg/dL    BUN 15 8 - 23 mg/dL    Creatinine 0.9 0.5 - 1.4 mg/dL    Calcium 8.9 8.7 - 10.5 mg/dL    Total Protein 6.5 6.0 - 8.4 g/dL    Albumin 3.5 3.5 - 5.2 g/dL    Total Bilirubin 0.5 0.1 - 1.0 mg/dL    Alkaline Phosphatase 77 55 - 135 U/L    AST 30 10 - 40 U/L    ALT 25 10 - 44 U/L    Anion Gap 11 8 - 16 mmol/L    eGFR if African American >60 >60 mL/min/1.73 m^2    eGFR if non African American >60 >60 mL/min/1.73 m^2   CBC auto differential    Collection Time: 11/03/20  7:42 AM   Result Value Ref Range    WBC 5.20 3.90 - 12.70 K/uL    RBC 4.21 (L) 4.60 - 6.20 M/uL    Hemoglobin 12.9 (L) 14.0 - 18.0 g/dL    Hematocrit 39.6 (L) 40.0 - 54.0 %    MCV 94 82 - 98 fL    MCH 30.6 27.0 - 31.0 pg    MCHC 32.6 32.0 - 36.0 g/dL    RDW 12.6 11.5 - 14.5 %    Platelets 176 150 - 350 K/uL    MPV 10.9 9.2 - 12.9 fL    Immature Granulocytes 1.3 (H) 0.0 - 0.5 %    Gran # (ANC) 3.2 1.8 - 7.7 K/uL    Immature Grans (Abs) 0.07 (H) 0.00 - 0.04 K/uL    Lymph # 1.2 1.0 - 4.8 K/uL    Mono # 0.5 0.3 - 1.0 K/uL    Eos # 0.3 0.0 - 0.5 K/uL    Baso # 0.03 0.00 - 0.20 K/uL    nRBC 0 0 /100 WBC    Gran % 61.0 38.0 - 73.0 %    Lymph % 22.9 18.0 - 48.0 %    Mono % 8.8 4.0 - 15.0 %    Eosinophil % 5.4 0.0 - 8.0 %    Basophil % 0.6 0.0 - 1.9 %    Differential Method Automated    Hepatitis Panel, Acute    Collection Time: 11/03/20  7:42 AM   Result Value Ref Range    Hepatitis B Surface Ag Negative Negative    Hep B C IgM Negative Negative    Hep A IgM Negative Negative    Hepatitis C Ab Negative Negative   Quantiferon Gold TB    Collection Time: 11/03/20  7:42 AM   Result Value Ref Range    NIL 0.020 See text IU/mL    TB1 - Nil 0.010 See text IU/mL    TB2 - Nil 0.020 See text IU/mL    Mitogen - Nil >10.000 See text IU/mL    TB Gold Plus Negative        Lab Results   Component Value Date    TBGOLDPLUS Negative 11/03/2020     Lab Results   Component Value Date    HEPAIGM Negative 11/03/2020    HEPBIGM Negative 11/03/2020    HEPBCAB Negative  10/01/2018    HEPCAB Negative 11/03/2020           Results for orders placed during the hospital encounter of 01/03/17   X-Ray Hand 3 View Bilateral    Narrative 3 views of either hand, 6 views total    Comparison: 03/10/2015    Findings: There is joint space narrowing and osteophyte formation seen at multiple interphalangeal joints bilaterally particularly the DIP joints and the 1st interphalangeal joint on the right.  A metallic foreign body is seen adjacent to the middle phalanx of the 2nd digit.  There are stable degenerative changes present at the 1st, 2nd and 3rd MCP joints on the left and also at the 5th and 1st MCP joints on the right.  No definite erosive changes are identified.    Impression  As above      Electronically signed by: ARNIE DUNHAM D.O.  Date:     01/03/17  Time:    08:58      Results for orders placed during the hospital encounter of 01/03/17   X-Ray Foot Complete Bilateral    Narrative 3 views of either foot, 6 views total    Comparison: Plain films from 2012    Findings: There are degenerative changes noted at both 1st MTP joints right greater than the left.  No acute fracture or dislocation is identified.  No erosive changes are suggested.  Tiny bilateral plantar calcaneal enthesophytes are noted.    Impression  As above      Electronically signed by: ARNIE DUNHAM D.O.  Date:     01/03/17  Time:    09:00      Results for orders placed during the hospital encounter of 06/11/14   X-Ray Chest PA And Lateral    Narrative Findings:    As compared with the prior study of 6/16/11, no significant interim change is identified.    Impression No acute abnormalities identified.      Electronically signed by: MIGUEL VALDIVIA MD  Date:     06/11/14  Time:    09:47        No results found for this or any previous visit.      Assessment:       1. Rheumatoid arthritis of multiple sites with negative rheumatoid factor    2. High risk medication use    3. Chronic musculoskeletal pain    4. Hepatomegaly     5. Immunocompromised patient          -Seronegative rheumatoid arthritis -   Orenica monotherapy-   Was stable but now worse    High DA  PAULINO-28 (CRP): 4.96 (Moderate disease activity)  cdai 31, RENEE 1.6  Our goal is to get and maintain at least mod DA      -Had elevated lft with actemra/mtx/crestor- much better off all these agents   GI work up fatty liver- GI will follow    Will remain off mtx and crestor for now  Watch diet and loose weight  Follow lft closely     Who has failed mtx monotheraoy, failed Enbrel, Remicade, Humira, cimzia- IV Orencia 1000 mg every 4,  Orencia 125 mg SC weekly injection, and xeljanz along with mtx 20 mg weekly      - Skin psoriasis stable - has prn topical- no scalp rash today     - Chronic unspecified colitis  - not UC- Stable    - Osteoarthritis of both knees and hips  Will have left FAIZAN next week     -Vaccines up to date -except  yearly flu vaccine-    - Medication Monitoring- no current issues, no evidence of toxicity    - Immunocompromised no issues with recurrent infections  Tb and hep studies utd 2018 and 11/2020    - Chronic pain on gabapentin 600 mg tid and cymbalta 60 mg/d- ongoing pain     - Recent dx parkinsons on medication- seeing neurology     - RI- CKD stage III- renal stones- sees urology  Passed recent renal stone  Cancelled renal biopsy during covid pandemic  Needs to get back to urology         Plan:     No orders of the defined types were placed in this encounter.         For now will maintain orencia 125 mg/wk  Post surgery and once healed  May do a 4 wks challenge of higher dose sub Q orencia to try and get back under better control    Skip orencia dose next week     Consider rinvoq  Consider rituxan       Increase his gabapentin up to 800 mg 3 X daily to help pain control  Keep cymbalta at 60 mg once daily and no higher per neurology     Flu Ad today   Tb gold utd 11/2020    rtc in early  Jan w/labs

## 2020-12-15 ENCOUNTER — TELEPHONE (OUTPATIENT)
Dept: RHEUMATOLOGY | Facility: CLINIC | Age: 70
End: 2020-12-15

## 2021-01-04 ENCOUNTER — TELEPHONE (OUTPATIENT)
Dept: RHEUMATOLOGY | Facility: CLINIC | Age: 71
End: 2021-01-04

## 2021-01-05 ENCOUNTER — PATIENT MESSAGE (OUTPATIENT)
Dept: RHEUMATOLOGY | Facility: CLINIC | Age: 71
End: 2021-01-05

## 2021-02-12 ENCOUNTER — TELEPHONE (OUTPATIENT)
Dept: RHEUMATOLOGY | Facility: CLINIC | Age: 71
End: 2021-02-12

## 2021-09-14 ENCOUNTER — TELEPHONE (OUTPATIENT)
Dept: RHEUMATOLOGY | Facility: CLINIC | Age: 71
End: 2021-09-14

## 2021-11-23 ENCOUNTER — TELEPHONE (OUTPATIENT)
Dept: RHEUMATOLOGY | Facility: CLINIC | Age: 71
End: 2021-11-23

## 2021-12-09 ENCOUNTER — TELEPHONE (OUTPATIENT)
Dept: RHEUMATOLOGY | Facility: CLINIC | Age: 71
End: 2021-12-09

## 2021-12-13 ENCOUNTER — TELEPHONE (OUTPATIENT)
Dept: RHEUMATOLOGY | Facility: CLINIC | Age: 71
End: 2021-12-13

## 2021-12-13 ENCOUNTER — PATIENT MESSAGE (OUTPATIENT)
Dept: RHEUMATOLOGY | Facility: CLINIC | Age: 71
End: 2021-12-13

## 2022-01-20 ENCOUNTER — HOSPITAL ENCOUNTER (OUTPATIENT)
Dept: RADIOLOGY | Facility: HOSPITAL | Age: 72
Discharge: HOME | End: 2022-01-20
Attending: PHYSICIAN ASSISTANT
Payer: MEDICARE

## 2022-01-20 ENCOUNTER — OFFICE VISIT (OUTPATIENT)
Dept: RHEUMATOLOGY | Facility: CLINIC | Age: 72
End: 2022-01-20
Payer: MEDICARE

## 2022-01-20 VITALS
SYSTOLIC BLOOD PRESSURE: 136 MMHG | WEIGHT: 227.94 LBS | BODY MASS INDEX: 35.78 KG/M2 | DIASTOLIC BLOOD PRESSURE: 70 MMHG | HEART RATE: 58 BPM | HEIGHT: 67 IN

## 2022-01-20 DIAGNOSIS — M47.816 SPONDYLOSIS OF LUMBAR REGION WITHOUT MYELOPATHY OR RADICULOPATHY: Chronic | ICD-10-CM

## 2022-01-20 DIAGNOSIS — G89.29 CHRONIC MUSCULOSKELETAL PAIN: ICD-10-CM

## 2022-01-20 DIAGNOSIS — M79.18 CHRONIC MUSCULOSKELETAL PAIN: ICD-10-CM

## 2022-01-20 DIAGNOSIS — M06.09 RHEUMATOID ARTHRITIS OF MULTIPLE SITES WITH NEGATIVE RHEUMATOID FACTOR: ICD-10-CM

## 2022-01-20 DIAGNOSIS — G89.29 CHRONIC NEUROPATHIC PAIN: ICD-10-CM

## 2022-01-20 DIAGNOSIS — M79.2 CHRONIC NEUROPATHIC PAIN: ICD-10-CM

## 2022-01-20 DIAGNOSIS — M06.09 RHEUMATOID ARTHRITIS OF MULTIPLE SITES WITH NEGATIVE RHEUMATOID FACTOR: Chronic | ICD-10-CM

## 2022-01-20 DIAGNOSIS — R53.83 FATIGUE, UNSPECIFIED TYPE: Primary | ICD-10-CM

## 2022-01-20 PROCEDURE — 73130 X-RAY EXAM OF HAND: CPT | Mod: 26,50,, | Performed by: RADIOLOGY

## 2022-01-20 PROCEDURE — 73630 X-RAY EXAM OF FOOT: CPT | Mod: 26,50,, | Performed by: RADIOLOGY

## 2022-01-20 PROCEDURE — 99999 PR PBB SHADOW E&M-EST. PATIENT-LVL IV: CPT | Mod: PBBFAC,,, | Performed by: PHYSICIAN ASSISTANT

## 2022-01-20 PROCEDURE — 73630 X-RAY EXAM OF FOOT: CPT | Mod: TC,50

## 2022-01-20 PROCEDURE — 73630 XR FOOT COMPLETE 3 VIEW BILATERAL: ICD-10-PCS | Mod: 26,50,, | Performed by: RADIOLOGY

## 2022-01-20 PROCEDURE — 99999 PR PBB SHADOW E&M-EST. PATIENT-LVL IV: ICD-10-PCS | Mod: PBBFAC,,, | Performed by: PHYSICIAN ASSISTANT

## 2022-01-20 PROCEDURE — 99215 PR OFFICE/OUTPT VISIT, EST, LEVL V, 40-54 MIN: ICD-10-PCS | Mod: S$GLB,,, | Performed by: PHYSICIAN ASSISTANT

## 2022-01-20 PROCEDURE — 99215 OFFICE O/P EST HI 40 MIN: CPT | Mod: S$GLB,,, | Performed by: PHYSICIAN ASSISTANT

## 2022-01-20 PROCEDURE — 73130 X-RAY EXAM OF HAND: CPT | Mod: TC,50

## 2022-01-20 PROCEDURE — 73130 XR HAND COMPLETE 3 VIEWS BILATERAL: ICD-10-PCS | Mod: 26,50,, | Performed by: RADIOLOGY

## 2022-01-20 RX ORDER — BETAMETHASONE DIPROPIONATE 0.5 MG/G
CREAM TOPICAL
COMMUNITY
Start: 2022-01-10 | End: 2023-06-28

## 2022-01-20 RX ORDER — SUCRALFATE 1 G/1
1 TABLET ORAL 3 TIMES DAILY
COMMUNITY
Start: 2021-12-30 | End: 2022-06-17

## 2022-01-20 RX ORDER — GABAPENTIN 800 MG/1
800 TABLET ORAL 3 TIMES DAILY
Qty: 270 TABLET | Refills: 3 | Status: SHIPPED | OUTPATIENT
Start: 2022-01-20 | End: 2022-06-17 | Stop reason: SDUPTHER

## 2022-01-20 NOTE — PROGRESS NOTES
"     RHEUMATOLOGY OUTPATIENT CLINIC NOTE    1/20/2022    Attending Rheumatologist: Rosangela Hampton PA-C  Primary Care Provider: Suzette Alvarez MD   Physician Requesting Consultation: João Alvarado MD  Chief Complaint/Reason For Consultation:  Rheumatoid Arthritis      Subjective:       HPI  Thomas Sarkar is a very pleasant 71 y.o. male back today for rheumatology followup.      We have followed thomas for seronegative RA with psoriasis overlap and chronic unspecified Colitis. He is followed by GI. His gi issues have been very quiet. RA has been refractory to trt He has been treated with  and failed mtx monotherapy and sulfasalazine. He has failed multiple tnf agents (remicade, enbrel, humira and cimzia), failed xeljanz, actemra caused elevated lft and low plt,   Was on IV Orencia 1000 mg Q 4 weeks  over 1 year- cost was high so Dr Douglass moved him to Orencia sub Q 125 mg weekly dose; this has been the best control we were able to get     He has been stable at mod DA; unable to get to Low DA or remission  Pain "all over"  Stiffness in his hands, wrists, elbows lasting all day  Swelling and pain mcp and pip joints  kaushik hip, shoulder and elbow pain but no swelling   Advanced OA kaushik hips; s/p R FAIZAN    Today pain level rated 9/10  Skin doing fair psoriasis comes and goes but under control for most part.     Now on gabapentin 800 mg tid, and Cymbalta 60 mg Q day; his neurologist does not want him on higher cymbalta deniz to his parkinson's dz.   on sinemet seeing DR Espinosa at [facility]     Vitals:    01/20/22 1402   BP: 136/70   Pulse: (!) 58   Weight: 103.4 kg (227 lb 15.3 oz)   Height: 5' 7" (1.702 m)     Review of Systems   Constitutional: Negative for chills, fever and weight loss.   Eyes: Negative for pain and redness.   Respiratory: Negative for cough, hemoptysis and shortness of breath.    Cardiovascular: Negative for chest pain.   Gastrointestinal: Negative for blood in stool and melena. "   Genitourinary: Negative for hematuria.   Musculoskeletal: Positive for joint pain.   Skin: Negative for rash.       Chronic comorbid conditions affecting medical decision making today:  Past Medical History:   Diagnosis Date    Acid reflux     Allergy     Arthritis     PSORIATIC    Cataract     CHF (congestive heart failure)     Chronic kidney disease     Diabetes mellitus, type 2     Dry eyes     Glaucoma     SECONDARY TO STEROIDS    Heart murmur     Hyperlipidemia     Myocardial infarction     Parkinson's disease     Rheumatoid arthritis(714.0)     Scalp tenderness     FROM PSORIASIS     Past Surgical History:   Procedure Laterality Date    ADENOIDECTOMY      CARDIAC CATHETERIZATION  2010    CATARACT EXTRACTION, BILATERAL  2012    EYE SURGERY      TONSILLECTOMY       Social History     Substance and Sexual Activity   Alcohol Use Not Currently     Social History     Tobacco Use   Smoking Status Former Smoker    Types: Cigarettes    Quit date: 1970    Years since quittin.5   Smokeless Tobacco Never Used     Social History     Substance and Sexual Activity   Drug Use No       Current Outpatient Medications:     aspirin (ECOTRIN) 81 MG EC tablet, Take 81 mg by mouth once daily., Disp: , Rfl:     carbidopa-levodopa  mg (SINEMET)  mg per tablet, Take 1 tablet by mouth 3 (three) times daily., Disp: , Rfl:     clonazePAM (KLONOPIN) 1 MG tablet, Take 1 mg by mouth every evening., Disp: , Rfl:     DULoxetine (CYMBALTA) 60 MG capsule, TAKE 1 CAPSULE DAILY, Disp: 90 capsule, Rfl: 3    finasteride (PROSCAR) 5 mg tablet, , Disp: , Rfl:     fish oil-omega-3 fatty acids 300-1,000 mg capsule, Take 1 g by mouth once daily., Disp: , Rfl:     gabapentin (NEURONTIN) 800 MG tablet, Take 1 tablet (800 mg total) by mouth 3 (three) times daily., Disp: 270 tablet, Rfl: 3    metFORMIN (GLUCOPHAGE-XR) 500 MG ER 24hr tablet, AT THE START OF THERAPY TAKE 1 TABLET AT BEDTIME FOR ONE WEEK  THEN INCREASE TO TWICE A DAY THEREAFTER, Disp: 180 tablet, Rfl: 3    methylcellulose, laxative, 500 mg Tab, Take by mouth once daily., Disp: , Rfl:     metoprolol succinate (TOPROL-XL) 50 MG 24 hr tablet, 1 tablet., Disp: , Rfl:     multivitamin capsule, Take 1 capsule by mouth., Disp: , Rfl:     ORENCIA CLICKJECT 125 mg/mL AtIn, INJECT 125 MG (1 ML) UNDER THE SKIN ONCE A WEEK, Disp: 4 mL, Rfl: 12    pantoprazole (PROTONIX) 40 MG tablet, 40 mg once daily., Disp: , Rfl:     REPATHA SURECLICK 140 mg/mL PnIj, INJECT 140MG UNDER THE SKIN EVERY 14 DAYS., Disp: , Rfl:     RESTASIS 0.05 % ophthalmic emulsion, , Disp: , Rfl:     tamsulosin (FLOMAX) 0.4 mg Cap, Take 1 capsule (0.4 mg total) by mouth once daily., Disp: 90 capsule, Rfl: 3    vitamin D (VITAMIN D3) 1000 units Tab, Take 2,000 Units by mouth once daily., Disp: , Rfl:      Objective:         Reviewed old and all outside pertinent medical records available.    Physical Exam  Vitals reviewed.   Constitutional:       Appearance: Normal appearance.   HENT:      Head: Normocephalic and atraumatic.   Eyes:      Extraocular Movements: Extraocular movements intact.      Pupils: Pupils are equal, round, and reactive to light.   Pulmonary:      Effort: Pulmonary effort is normal.   Musculoskeletal:         General: Tenderness present. No deformity or signs of injury.      Right elbow: Decreased range of motion.      Left elbow: Decreased range of motion.      Right wrist: Tenderness present. Decreased range of motion.      Left wrist: Tenderness present. Decreased range of motion.      Right hand: Swelling and tenderness present. Decreased strength.      Left hand: Swelling and tenderness present. Decreased strength.      Comments: 20-30% fist formation bilaterally   Skin:     General: Skin is warm and dry.   Neurological:      General: No focal deficit present.      Mental Status: He is alert and oriented to person, place, and time.   Psychiatric:         Mood and  Affect: Mood normal.         Behavior: Behavior normal.       Immunization History   Administered Date(s) Administered    COVID-19, MRNA, LN-S, PF (MODERNA FULL 0.5 ML DOSE) 05/20/2021, 06/17/2021    Influenza 10/02/2018    Influenza (FLUAD) - Quadrivalent - Adjuvanted - PF *Preferred* (65+) 11/10/2020    Influenza - High Dose - PF (65 years and older) 10/30/2014, 10/28/2015, 10/11/2016    Influenza - Quadrivalent 10/30/2017    Influenza - Quadrivalent - PF *Preferred* (6 months and older) 11/07/2013, 10/30/2017, 10/02/2018, 10/02/2019    Influenza Split 11/07/2013    PPD Test 08/07/2012, 10/28/2015    Pneumococcal Conjugate - 13 Valent 11/14/2013, 11/14/2013    Pneumococcal Polysaccharide - 23 Valent 06/11/2014, 03/10/2020    Tdap 11/07/2013    Zoster 11/21/2013    Zoster Recombinant 05/19/2020       All lab results personally reviewed and interpreted by me.    Lab Results   Component Value Date     11/03/2020    K 3.9 11/03/2020     11/03/2020    CO2 24 11/03/2020     11/03/2020    BUN 15 11/03/2020    CALCIUM 8.9 11/03/2020    PROT 6.5 11/03/2020    ALBUMIN 3.5 11/03/2020    BILITOT 0.5 11/03/2020    AST 30 11/03/2020    ALKPHOS 77 11/03/2020    ALT 25 11/03/2020       Imaging:  All imaging reviewed and independently interpreted by me.     ASSESSMENT / PLAN:     ASSESSMENT:  1. Rheumatoid arthritis of multiple sites with negative rheumatoid factor    2. High risk medication use    3. Chronic musculoskeletal pain    4. Hepatomegaly    5. Immunocompromised patient        -Seronegative rheumatoid arthritis -   Orenica monotherapy-   Was stable but now worse     -Had elevated lft with actemra/mtx/crestor- much better off all these agents   GI work up fatty liver- GI will follow     Will remain off mtx and crestor for now     Who has failed mtx monotheraoy, failed Enbrel, Remicade, Humira, cimzia- IV Orencia 1000 mg every 4,  Orencia 125 mg SC weekly injection, and xeljanz along with  mtx 20 mg weekly     - Skin psoriasis stable - has prn topical- no scalp rash today      - Chronic unspecified colitis  - not UC- Stable     - Osteoarthritis of both knees and hips     -Vaccines up to date -except  yearly flu vaccine-     - Medication Monitoring- no current issues, no evidence of toxicity     - Immunocompromised no issues with recurrent infections  Tb and hep studies updated today     - Chronic pain on gabapentin 800 mg tid and cymbalta 60 mg/d- ongoing pain       - RI- CKD stage III- renal stones-    PLAN:  Trial rinvoq in place of orencia     Consider increased dose of orencia  Consider rituxan     Get kaushik hand and foot XR today    Method of contact with patient concerns: MyChart attn Rheumatology    60 minutes of total time spent on the encounter, which includes face to face time and non-face to face time preparing to see the patient (eg, review of tests), Obtaining and/or reviewing separately obtained history, Documenting clinical information in the electronic or other health record, Independently interpreting results (not separately reported) and communicating results to the patient/family/caregiver, or Care coordination (not separately reported).        Rosangela Hampton PA-C  Rheumatology Department   [facility] [place]

## 2022-01-21 ENCOUNTER — DOCUMENTATION ONLY (OUTPATIENT)
Dept: RHEUMATOLOGY | Facility: CLINIC | Age: 72
End: 2022-01-21
Payer: MEDICARE

## 2022-03-07 ENCOUNTER — PATIENT MESSAGE (OUTPATIENT)
Dept: RHEUMATOLOGY | Facility: CLINIC | Age: 72
End: 2022-03-07
Payer: MEDICARE

## 2022-03-08 ENCOUNTER — TELEPHONE (OUTPATIENT)
Dept: RHEUMATOLOGY | Facility: CLINIC | Age: 72
End: 2022-03-08
Payer: MEDICARE

## 2022-03-09 ENCOUNTER — LAB VISIT (OUTPATIENT)
Dept: LAB | Facility: HOSPITAL | Age: 72
End: 2022-03-09
Attending: FAMILY MEDICINE
Payer: MEDICARE

## 2022-03-09 ENCOUNTER — OFFICE VISIT (OUTPATIENT)
Dept: RHEUMATOLOGY | Facility: CLINIC | Age: 72
End: 2022-03-09
Payer: MEDICARE

## 2022-03-09 VITALS
DIASTOLIC BLOOD PRESSURE: 65 MMHG | WEIGHT: 228.38 LBS | SYSTOLIC BLOOD PRESSURE: 114 MMHG | HEIGHT: 67 IN | HEART RATE: 57 BPM | BODY MASS INDEX: 35.84 KG/M2

## 2022-03-09 DIAGNOSIS — Z79.899 HIGH RISK MEDICATION USE: Primary | ICD-10-CM

## 2022-03-09 DIAGNOSIS — D84.9 IMMUNOCOMPROMISED PATIENT: ICD-10-CM

## 2022-03-09 DIAGNOSIS — Z79.899 HIGH RISK MEDICATION USE: ICD-10-CM

## 2022-03-09 DIAGNOSIS — M79.641 PAIN OF RIGHT HAND: ICD-10-CM

## 2022-03-09 DIAGNOSIS — G89.29 CHRONIC MUSCULOSKELETAL PAIN: ICD-10-CM

## 2022-03-09 DIAGNOSIS — L40.9 PSORIASIS: ICD-10-CM

## 2022-03-09 DIAGNOSIS — L40.50 PSORIATIC ARTHRITIS: ICD-10-CM

## 2022-03-09 DIAGNOSIS — M06.09 RHEUMATOID ARTHRITIS OF MULTIPLE SITES WITH NEGATIVE RHEUMATOID FACTOR: Primary | ICD-10-CM

## 2022-03-09 DIAGNOSIS — M79.18 CHRONIC MUSCULOSKELETAL PAIN: ICD-10-CM

## 2022-03-09 DIAGNOSIS — M25.531 RIGHT WRIST PAIN: ICD-10-CM

## 2022-03-09 DIAGNOSIS — M06.09 RHEUMATOID ARTHRITIS OF MULTIPLE SITES WITH NEGATIVE RHEUMATOID FACTOR: Chronic | ICD-10-CM

## 2022-03-09 LAB
ALBUMIN SERPL BCP-MCNC: 3.7 G/DL (ref 3.5–5.2)
ALP SERPL-CCNC: 81 U/L (ref 55–135)
ALT SERPL W/O P-5'-P-CCNC: 47 U/L (ref 10–44)
ANION GAP SERPL CALC-SCNC: 10 MMOL/L (ref 8–16)
AST SERPL-CCNC: 58 U/L (ref 10–40)
BASOPHILS # BLD AUTO: 0.05 K/UL (ref 0–0.2)
BASOPHILS NFR BLD: 0.7 % (ref 0–1.9)
BILIRUB SERPL-MCNC: 0.8 MG/DL (ref 0.1–1)
BUN SERPL-MCNC: 15 MG/DL (ref 8–23)
CALCIUM SERPL-MCNC: 9.1 MG/DL (ref 8.7–10.5)
CHLORIDE SERPL-SCNC: 103 MMOL/L (ref 95–110)
CO2 SERPL-SCNC: 25 MMOL/L (ref 23–29)
CREAT SERPL-MCNC: 0.9 MG/DL (ref 0.5–1.4)
CRP SERPL-MCNC: 4.2 MG/L (ref 0–8.2)
DIFFERENTIAL METHOD: ABNORMAL
EOSINOPHIL # BLD AUTO: 0.2 K/UL (ref 0–0.5)
EOSINOPHIL NFR BLD: 2.3 % (ref 0–8)
ERYTHROCYTE [DISTWIDTH] IN BLOOD BY AUTOMATED COUNT: 13.2 % (ref 11.5–14.5)
ERYTHROCYTE [SEDIMENTATION RATE] IN BLOOD BY WESTERGREN METHOD: 22 MM/HR (ref 0–23)
EST. GFR  (AFRICAN AMERICAN): >60 ML/MIN/1.73 M^2
EST. GFR  (NON AFRICAN AMERICAN): >60 ML/MIN/1.73 M^2
GLUCOSE SERPL-MCNC: 106 MG/DL (ref 70–110)
HCT VFR BLD AUTO: 39.4 % (ref 40–54)
HGB BLD-MCNC: 13.4 G/DL (ref 14–18)
IMM GRANULOCYTES # BLD AUTO: 0.1 K/UL (ref 0–0.04)
IMM GRANULOCYTES NFR BLD AUTO: 1.4 % (ref 0–0.5)
LYMPHOCYTES # BLD AUTO: 1.5 K/UL (ref 1–4.8)
LYMPHOCYTES NFR BLD: 22.1 % (ref 18–48)
MCH RBC QN AUTO: 30.9 PG (ref 27–31)
MCHC RBC AUTO-ENTMCNC: 34 G/DL (ref 32–36)
MCV RBC AUTO: 91 FL (ref 82–98)
MONOCYTES # BLD AUTO: 0.7 K/UL (ref 0.3–1)
MONOCYTES NFR BLD: 10.5 % (ref 4–15)
NEUTROPHILS # BLD AUTO: 4.4 K/UL (ref 1.8–7.7)
NEUTROPHILS NFR BLD: 63 % (ref 38–73)
NRBC BLD-RTO: 0 /100 WBC
PLATELET # BLD AUTO: 231 K/UL (ref 150–450)
PMV BLD AUTO: 10.8 FL (ref 9.2–12.9)
POTASSIUM SERPL-SCNC: 4 MMOL/L (ref 3.5–5.1)
PROT SERPL-MCNC: 7.2 G/DL (ref 6–8.4)
RBC # BLD AUTO: 4.33 M/UL (ref 4.6–6.2)
SODIUM SERPL-SCNC: 138 MMOL/L (ref 136–145)
WBC # BLD AUTO: 6.96 K/UL (ref 3.9–12.7)

## 2022-03-09 PROCEDURE — 3288F FALL RISK ASSESSMENT DOCD: CPT | Mod: CPTII,S$GLB,ICN, | Performed by: PHYSICIAN ASSISTANT

## 2022-03-09 PROCEDURE — 3078F DIAST BP <80 MM HG: CPT | Mod: CPTII,S$GLB,ICN, | Performed by: PHYSICIAN ASSISTANT

## 2022-03-09 PROCEDURE — 1125F AMNT PAIN NOTED PAIN PRSNT: CPT | Mod: CPTII,S$GLB,ICN, | Performed by: PHYSICIAN ASSISTANT

## 2022-03-09 PROCEDURE — 1125F PR PAIN SEVERITY QUANTIFIED, PAIN PRESENT: ICD-10-PCS | Mod: CPTII,S$GLB,ICN, | Performed by: PHYSICIAN ASSISTANT

## 2022-03-09 PROCEDURE — 85652 RBC SED RATE AUTOMATED: CPT | Performed by: PHYSICIAN ASSISTANT

## 2022-03-09 PROCEDURE — 1100F PR PT FALLS ASSESS DOC 2+ FALLS/FALL W/INJURY/YR: ICD-10-PCS | Mod: CPTII,S$GLB,ICN, | Performed by: PHYSICIAN ASSISTANT

## 2022-03-09 PROCEDURE — 1159F PR MEDICATION LIST DOCUMENTED IN MEDICAL RECORD: ICD-10-PCS | Mod: CPTII,S$GLB,ICN, | Performed by: PHYSICIAN ASSISTANT

## 2022-03-09 PROCEDURE — G0008 ADMIN INFLUENZA VIRUS VAC: HCPCS | Mod: S$GLB,ICN,, | Performed by: PHYSICIAN ASSISTANT

## 2022-03-09 PROCEDURE — 3008F PR BODY MASS INDEX (BMI) DOCUMENTED: ICD-10-PCS | Mod: CPTII,S$GLB,ICN, | Performed by: PHYSICIAN ASSISTANT

## 2022-03-09 PROCEDURE — 3074F SYST BP LT 130 MM HG: CPT | Mod: CPTII,S$GLB,ICN, | Performed by: PHYSICIAN ASSISTANT

## 2022-03-09 PROCEDURE — 86140 C-REACTIVE PROTEIN: CPT | Performed by: PHYSICIAN ASSISTANT

## 2022-03-09 PROCEDURE — 86480 TB TEST CELL IMMUN MEASURE: CPT | Performed by: PHYSICIAN ASSISTANT

## 2022-03-09 PROCEDURE — 3078F PR MOST RECENT DIASTOLIC BLOOD PRESSURE < 80 MM HG: ICD-10-PCS | Mod: CPTII,S$GLB,ICN, | Performed by: PHYSICIAN ASSISTANT

## 2022-03-09 PROCEDURE — 1100F PTFALLS ASSESS-DOCD GE2>/YR: CPT | Mod: CPTII,S$GLB,ICN, | Performed by: PHYSICIAN ASSISTANT

## 2022-03-09 PROCEDURE — 3008F BODY MASS INDEX DOCD: CPT | Mod: CPTII,S$GLB,ICN, | Performed by: PHYSICIAN ASSISTANT

## 2022-03-09 PROCEDURE — 99215 PR OFFICE/OUTPT VISIT, EST, LEVL V, 40-54 MIN: ICD-10-PCS | Mod: 25,S$GLB,ICN, | Performed by: PHYSICIAN ASSISTANT

## 2022-03-09 PROCEDURE — 3288F PR FALLS RISK ASSESSMENT DOCUMENTED: ICD-10-PCS | Mod: CPTII,S$GLB,ICN, | Performed by: PHYSICIAN ASSISTANT

## 2022-03-09 PROCEDURE — 3074F PR MOST RECENT SYSTOLIC BLOOD PRESSURE < 130 MM HG: ICD-10-PCS | Mod: CPTII,S$GLB,ICN, | Performed by: PHYSICIAN ASSISTANT

## 2022-03-09 PROCEDURE — 36415 COLL VENOUS BLD VENIPUNCTURE: CPT | Performed by: PHYSICIAN ASSISTANT

## 2022-03-09 PROCEDURE — 85025 COMPLETE CBC W/AUTO DIFF WBC: CPT | Performed by: PHYSICIAN ASSISTANT

## 2022-03-09 PROCEDURE — 1159F MED LIST DOCD IN RCRD: CPT | Mod: CPTII,S$GLB,ICN, | Performed by: PHYSICIAN ASSISTANT

## 2022-03-09 PROCEDURE — 99999 PR PBB SHADOW E&M-EST. PATIENT-LVL IV: CPT | Mod: PBBFAC,,, | Performed by: PHYSICIAN ASSISTANT

## 2022-03-09 PROCEDURE — G0008 PR ADMIN INFLUENZA VIRUS VAC: ICD-10-PCS | Mod: S$GLB,ICN,, | Performed by: PHYSICIAN ASSISTANT

## 2022-03-09 PROCEDURE — 99215 OFFICE O/P EST HI 40 MIN: CPT | Mod: 25,S$GLB,ICN, | Performed by: PHYSICIAN ASSISTANT

## 2022-03-09 PROCEDURE — 99999 PR PBB SHADOW E&M-EST. PATIENT-LVL IV: ICD-10-PCS | Mod: PBBFAC,,, | Performed by: PHYSICIAN ASSISTANT

## 2022-03-09 PROCEDURE — 80053 COMPREHEN METABOLIC PANEL: CPT | Performed by: PHYSICIAN ASSISTANT

## 2022-03-09 PROCEDURE — 90653 FLU VACCINE - ADJUVANTED: ICD-10-PCS | Mod: S$GLB,ICN,, | Performed by: PHYSICIAN ASSISTANT

## 2022-03-09 PROCEDURE — 90653 IIV ADJUVANT VACCINE IM: CPT | Mod: S$GLB,ICN,, | Performed by: PHYSICIAN ASSISTANT

## 2022-03-09 RX ORDER — DICYCLOMINE HYDROCHLORIDE 20 MG/1
20 TABLET ORAL 4 TIMES DAILY
COMMUNITY
Start: 2022-02-14

## 2022-03-09 NOTE — PROGRESS NOTES
RHEUMATOLOGY OUTPATIENT CLINIC NOTE    3/9/2022    Attending Rheumatologist: Rosangela Hampton PA-C  Primary Care Provider: Suzette Alvarez MD   Chief Complaint/Reason For Consultation:  Rheumatoid Arthritis      Subjective:        Thomas Sarkar is a 71 y.o. male here today for follow up refractory to treatment seronegative RA with psoriasis overlap and chronic unspecified Colitis. Last visit RA seemed to be worsening overall. Decided to try rinvoq in place of orencia. prior Orencia sub Q 125 mg weekly dose was best control we were able to get. Higher cost for patient with IV infusions. Would like to go to orencia infustions but cost very high. Skin doing fair psoriasis comes and goes but under control for most part. Hx Advanced OA kaushik hips; s/p R FAIZAN. Physical exam shows swelling and pain mcp and pip joints. Rheumatologic systems otherwise negative.    Now on gabapentin 800 mg tid, and Cymbalta 60 mg Q day; his neurologist does not want him on higher cymbalta deniz to his parkinson's dz. on sinemet seeing DR Espinosa at [facility] [place]     Serologies  Lab Results   Component Value Date    TBGOLDPLUS Negative 03/09/2022     Lab Results   Component Value Date    DALTON Negative 08/31/2010      Lab Results   Component Value Date    RF Negative 08/31/2010      Lab Results   Component Value Date    HEPAIGM Negative 01/20/2022    HEPBIGM Negative 01/20/2022    HEPBCAB Negative 10/01/2018    HEPCAB Negative 01/20/2022         Current Rheum Medications:  · rinvoq  Previous Rheum Medications:   · MTX monotherapy, SSZ, remicade, enbrel, humira, cimzia, xeljanz, actemra (increased LFTs and decreased platelets), orencia    Past Medical History:   Diagnosis Date    Acid reflux     Allergy     Arthritis     PSORIATIC    Cataract     CHF (congestive heart failure)     Chronic kidney disease     Diabetes mellitus, type 2     Dry eyes     Glaucoma     SECONDARY TO STEROIDS    Heart murmur     Hyperlipidemia      "Myocardial infarction     Parkinson's disease     Rheumatoid arthritis(714.0)     Scalp tenderness     FROM PSORIASIS     Social History     Socioeconomic History    Marital status:    Tobacco Use    Smoking status: Former Smoker     Types: Cigarettes     Quit date: 1970     Years since quittin.7    Smokeless tobacco: Never Used   Substance and Sexual Activity    Alcohol use: Not Currently    Drug use: No    Sexual activity: Yes     Partners: Female     Review of patient's allergies indicates:   Allergen Reactions    Bactrim [sulfamethoxazole-trimethoprim]      Heart attack/kidney shut down    Iodinated contrast media      "Renal failure"    Tetanus vaccines and toxoid Other (See Comments)     Flu like symptoms (old style tetanus-with horse serum)    Corticosteroids (glucocorticoids)      Builds up occular pressure    Dye      Contrast dye for CAT scans etc. Caused renal failure       Objective:   /65   Pulse (!) 57   Ht 5' 7" (1.702 m)   Wt 103.6 kg (228 lb 6.3 oz)   BMI 35.77 kg/m²     Immunization History   Administered Date(s) Administered    Influenza 10/02/2018    Influenza (FLUAD) - Quadrivalent - Adjuvanted - PF *Preferred* (65+) 11/10/2020    Influenza - High Dose - PF (65 years and older) 10/30/2014, 10/28/2015, 10/11/2016    Influenza - Quadrivalent 10/30/2017    Influenza - Quadrivalent - PF *Preferred* (6 months and older) 2013, 10/30/2017, 10/02/2018, 10/02/2019    Influenza Split 2013    PPD Test 2012, 10/28/2015    Pneumococcal Conjugate - 13 Valent 2013, 2013    Pneumococcal Polysaccharide - 23 Valent 2014, 03/10/2020    Tdap 2013    Zoster 2013    Zoster Recombinant 2020       Current Outpatient Medications:     aspirin (ECOTRIN) 81 MG EC tablet, Take 81 mg by mouth once daily., Disp: , Rfl:     betamethasone dipropionate 0.05 % cream, Apply topically., Disp: , Rfl:     carbidopa-levodopa "  mg (SINEMET)  mg per tablet, Take 1 tablet by mouth 3 (three) times daily., Disp: , Rfl:     clonazePAM (KLONOPIN) 1 MG tablet, Take 1 mg by mouth every evening., Disp: , Rfl:     DULoxetine (CYMBALTA) 60 MG capsule, TAKE 1 CAPSULE DAILY, Disp: 90 capsule, Rfl: 3    fish oil-omega-3 fatty acids 300-1,000 mg capsule, Take 1 g by mouth once daily., Disp: , Rfl:     gabapentin (NEURONTIN) 800 MG tablet, Take 1 tablet (800 mg total) by mouth 3 (three) times daily., Disp: 270 tablet, Rfl: 3    metFORMIN (GLUCOPHAGE-XR) 500 MG ER 24hr tablet, AT THE START OF THERAPY TAKE 1 TABLET AT BEDTIME FOR ONE WEEK THEN INCREASE TO TWICE A DAY THEREAFTER, Disp: 180 tablet, Rfl: 3    methylcellulose, laxative, 500 mg Tab, Take by mouth once daily., Disp: , Rfl:     metoprolol succinate (TOPROL-XL) 50 MG 24 hr tablet, 1 tablet., Disp: , Rfl:     multivitamin capsule, Take 1 capsule by mouth., Disp: , Rfl:     ORENCIA CLICKJECT 125 mg/mL AtIn, INJECT 125 MG (1 ML) UNDER THE SKIN ONCE A WEEK, Disp: 4 mL, Rfl: 12    pantoprazole (PROTONIX) 40 MG tablet, 40 mg once daily., Disp: , Rfl:     REPATHA SURECLICK 140 mg/mL PnIj, INJECT 140MG UNDER THE SKIN EVERY 14 DAYS., Disp: , Rfl:     sucralfate (CARAFATE) 1 gram tablet, Take 1 g by mouth 3 (three) times daily., Disp: , Rfl:     tamsulosin (FLOMAX) 0.4 mg Cap, Take 1 capsule (0.4 mg total) by mouth once daily., Disp: 90 capsule, Rfl: 3    vitamin D (VITAMIN D3) 1000 units Tab, Take 2,000 Units by mouth once daily., Disp: , Rfl:       Imaging:  All imaging reviewed and independently interpreted by me.    XR HAND COMPLETE 3 VIEWS BILATERAL 1/20/22  FINDINGS:  Stable appearance of a metallic foreign body adjacent to the middle phalanx of the 2nd digit on the right.  Stable degenerative changes seen scattered throughout the interphalangeal joints bilaterally.  No erosive changes are noted.  There also some degenerative changes seen involving the MCP joints bilaterally  that are also similar in appearance to the prior study and greatest at the 1st through 3rd MCP joints on the left and at the 1st, 3rd and 5th MCP joints on the right    XR FOOT COMPLETE 3 VIEW BILATERAL 1/20/22  FINDINGS:  Moderate to severe degenerative change seen the bilateral 1st MTP joints.  No acute fracture or dislocation.  Small bilateral plantar calcaneal enthesophytes are noted.  No erosive changes are suggested.  There is a subchondral cyst seen within the 5th metatarsal head on the left.    Assessment:     1. Rheumatoid arthritis of multiple sites with negative rheumatoid factor    2. High risk medication use    3. Immunocompromised patient    4. Chronic musculoskeletal pain    5. Psoriasis    6. Psoriatic arthritis          Plan:   Assessment and plan discussed with Dr. Tomlinson    · Seronegative RA w/ psoriasis overlap. PE findings more consistent now w/ PsA. Will get updated MRI of the hands and wrists; can consider IL 17,23 if more tenosynovitis seen. Also can consider use of prograf starting with 1mg BID. Discussed rituxan but does not work as well in seroneg RA  · Give high dose flu vaccine today  · Compromised immune system secondary to autoimmune disease and use of immunosuppressive drugs. Monitor carefully for infections and toxicities- Currently denies issues with recurrent infections. Advised to get immediate medical care if any infection.  · Recommend Yearly Skin Cancer Screening by Dermatology for all patients on biologic or Altagracia. advised strict adherence to age appropriate vaccinations (shingles, pneumonia, flu and covid) and cancer screenings with PCP   · Patient advised to hold DMARD and/or biologic therapy for signs of infection or for surgery. If you are unsure what to do please call our office for instruction.[facility] [phone]  · no current medication related issues, no evidence of toxicity. I ordered labs for toxicity monitoring;  results reviewed and  discussed findings with the patient   · Return to clinic: 3 mos w/ early reg 4    The patient understands, chooses and consents to this plan and accepts all the risks which include but are not limited to the risks mentioned above.     Method of contact with patient concerns: [facility]    60 minutes of total time spent on the encounter, which includes face to face time and non-face to face time preparing to see the patient (eg, review of tests), Obtaining and/or reviewing separately obtained history, Documenting clinical information in the electronic or other health record, Independently interpreting results (not separately reported) and communicating results to the patient/family/caregiver, or Care coordination (not separately reported).             Rosangela Hampton PA-C  Rheumatology Department   [facility] [place]

## 2022-03-11 ENCOUNTER — TELEPHONE (OUTPATIENT)
Dept: RHEUMATOLOGY | Facility: CLINIC | Age: 72
End: 2022-03-11
Payer: MEDICARE

## 2022-03-11 ENCOUNTER — PATIENT MESSAGE (OUTPATIENT)
Dept: RHEUMATOLOGY | Facility: CLINIC | Age: 72
End: 2022-03-11
Payer: MEDICARE

## 2022-03-11 LAB
GAMMA INTERFERON BACKGROUND BLD IA-ACNC: 0.05 IU/ML
M TB IFN-G CD4+ BCKGRND COR BLD-ACNC: -0.02 IU/ML
M TB IFN-G CD4+CD8+ BCKGRND COR BLD-ACNC: -0.02 IU/ML
MITOGEN IGNF BCKGRD COR BLD-ACNC: >10 IU/ML
TB GOLD PLUS: NEGATIVE

## 2022-03-11 NOTE — TELEPHONE ENCOUNTER
Spoke to wife regarding pt's lab results. Phone was disconnected and couldn't hear the response. Attempted to call back but went to voicemail. Advised them of lab results in msg and also said I would sent portal msg.

## 2022-03-11 NOTE — TELEPHONE ENCOUNTER
----- Message from Rosangela Hampton PA-C sent at 3/11/2022 12:43 PM CST -----  Normal inflammatory markers; mild elevation liver enzymes. Repeat in 1 month. Please schedule a CMP lab only for this patient

## 2022-03-25 ENCOUNTER — PATIENT MESSAGE (OUTPATIENT)
Dept: RHEUMATOLOGY | Facility: CLINIC | Age: 72
End: 2022-03-25
Payer: MEDICARE

## 2022-03-30 ENCOUNTER — PATIENT MESSAGE (OUTPATIENT)
Dept: RHEUMATOLOGY | Facility: CLINIC | Age: 72
End: 2022-03-30
Payer: MEDICARE

## 2022-04-06 ENCOUNTER — LAB VISIT (OUTPATIENT)
Dept: LAB | Facility: HOSPITAL | Age: 72
End: 2022-04-06
Attending: PHYSICIAN ASSISTANT
Payer: MEDICARE

## 2022-04-06 DIAGNOSIS — M06.09 RHEUMATOID ARTHRITIS OF MULTIPLE SITES WITH NEGATIVE RHEUMATOID FACTOR: Chronic | ICD-10-CM

## 2022-04-06 LAB
ALBUMIN SERPL BCP-MCNC: 3.6 G/DL (ref 3.5–5.2)
ALP SERPL-CCNC: 85 U/L (ref 55–135)
ALT SERPL W/O P-5'-P-CCNC: 58 U/L (ref 10–44)
ANION GAP SERPL CALC-SCNC: 11 MMOL/L (ref 8–16)
AST SERPL-CCNC: 50 U/L (ref 10–40)
BILIRUB SERPL-MCNC: 0.5 MG/DL (ref 0.1–1)
BUN SERPL-MCNC: 12 MG/DL (ref 8–23)
CALCIUM SERPL-MCNC: 9.2 MG/DL (ref 8.7–10.5)
CHLORIDE SERPL-SCNC: 104 MMOL/L (ref 95–110)
CO2 SERPL-SCNC: 26 MMOL/L (ref 23–29)
CREAT SERPL-MCNC: 1 MG/DL (ref 0.5–1.4)
EST. GFR  (AFRICAN AMERICAN): >60 ML/MIN/1.73 M^2
EST. GFR  (NON AFRICAN AMERICAN): >60 ML/MIN/1.73 M^2
GLUCOSE SERPL-MCNC: 138 MG/DL (ref 70–110)
POTASSIUM SERPL-SCNC: 4.5 MMOL/L (ref 3.5–5.1)
PROT SERPL-MCNC: 7 G/DL (ref 6–8.4)
SODIUM SERPL-SCNC: 141 MMOL/L (ref 136–145)

## 2022-04-06 PROCEDURE — 36415 COLL VENOUS BLD VENIPUNCTURE: CPT | Performed by: PHYSICIAN ASSISTANT

## 2022-04-06 PROCEDURE — 80053 COMPREHEN METABOLIC PANEL: CPT | Performed by: PHYSICIAN ASSISTANT

## 2022-04-08 ENCOUNTER — TELEPHONE (OUTPATIENT)
Dept: RHEUMATOLOGY | Facility: CLINIC | Age: 72
End: 2022-04-08
Payer: MEDICARE

## 2022-04-08 DIAGNOSIS — R74.8 ELEVATED LIVER ENZYMES: Primary | ICD-10-CM

## 2022-04-11 ENCOUNTER — PATIENT MESSAGE (OUTPATIENT)
Dept: RHEUMATOLOGY | Facility: CLINIC | Age: 72
End: 2022-04-11
Payer: MEDICARE

## 2022-04-28 ENCOUNTER — PATIENT MESSAGE (OUTPATIENT)
Dept: HEPATOLOGY | Facility: CLINIC | Age: 72
End: 2022-04-28
Payer: MEDICARE

## 2022-04-29 ENCOUNTER — HOSPITAL ENCOUNTER (OUTPATIENT)
Dept: RADIOLOGY | Facility: HOSPITAL | Age: 72
Discharge: HOME | End: 2022-04-29
Attending: PHYSICIAN ASSISTANT
Payer: MEDICARE

## 2022-04-29 DIAGNOSIS — M79.641 PAIN OF RIGHT HAND: ICD-10-CM

## 2022-04-29 DIAGNOSIS — M25.531 RIGHT WRIST PAIN: ICD-10-CM

## 2022-04-29 PROCEDURE — 73220 MRI UPPR EXTREMITY W/O&W/DYE: CPT | Mod: TC,RT

## 2022-04-29 PROCEDURE — A9585 GADOBUTROL INJECTION: HCPCS | Performed by: PHYSICIAN ASSISTANT

## 2022-04-29 PROCEDURE — 73223 MRI WRIST W WO CONTRAST RIGHT: ICD-10-PCS | Mod: 26,RT,, | Performed by: RADIOLOGY

## 2022-04-29 PROCEDURE — 73223 MRI JOINT UPR EXTR W/O&W/DYE: CPT | Mod: TC,RT

## 2022-04-29 PROCEDURE — 25500020 PHARM REV CODE 255: Performed by: PHYSICIAN ASSISTANT

## 2022-04-29 PROCEDURE — 73220 MRI UPPR EXTREMITY W/O&W/DYE: CPT | Mod: 26,RT,, | Performed by: RADIOLOGY

## 2022-04-29 PROCEDURE — 73220 MRI HAND FINGERS W WO CONTRAST RIGHT: ICD-10-PCS | Mod: 26,RT,, | Performed by: RADIOLOGY

## 2022-04-29 PROCEDURE — 73223 MRI JOINT UPR EXTR W/O&W/DYE: CPT | Mod: 26,RT,, | Performed by: RADIOLOGY

## 2022-04-29 RX ORDER — GADOBUTROL 604.72 MG/ML
10 INJECTION INTRAVENOUS
Status: COMPLETED | OUTPATIENT
Start: 2022-04-29 | End: 2022-04-29

## 2022-04-29 RX ADMIN — GADOBUTROL 10 ML: 604.72 INJECTION INTRAVENOUS at 11:04

## 2022-05-05 ENCOUNTER — PATIENT MESSAGE (OUTPATIENT)
Dept: RHEUMATOLOGY | Facility: CLINIC | Age: 72
End: 2022-05-05
Payer: MEDICARE

## 2022-05-11 ENCOUNTER — PATIENT MESSAGE (OUTPATIENT)
Dept: HEPATOLOGY | Facility: CLINIC | Age: 72
End: 2022-05-11
Payer: MEDICARE

## 2022-05-31 DIAGNOSIS — G89.29 CHRONIC MUSCULOSKELETAL PAIN: ICD-10-CM

## 2022-05-31 DIAGNOSIS — M17.0 PRIMARY OSTEOARTHRITIS OF BOTH KNEES: ICD-10-CM

## 2022-05-31 DIAGNOSIS — M47.816 SPONDYLOSIS OF LUMBAR REGION WITHOUT MYELOPATHY OR RADICULOPATHY: Chronic | ICD-10-CM

## 2022-05-31 DIAGNOSIS — M79.18 CHRONIC MUSCULOSKELETAL PAIN: ICD-10-CM

## 2022-05-31 DIAGNOSIS — M06.09 RHEUMATOID ARTHRITIS OF MULTIPLE SITES WITH NEGATIVE RHEUMATOID FACTOR: Chronic | ICD-10-CM

## 2022-05-31 RX ORDER — DULOXETIN HYDROCHLORIDE 60 MG/1
60 CAPSULE, DELAYED RELEASE ORAL DAILY
Qty: 30 CAPSULE | Refills: 0 | Status: SHIPPED | OUTPATIENT
Start: 2022-05-31 | End: 2022-07-18

## 2022-05-31 RX ORDER — DULOXETIN HYDROCHLORIDE 60 MG/1
60 CAPSULE, DELAYED RELEASE ORAL DAILY
Qty: 30 CAPSULE | Refills: 0 | OUTPATIENT
Start: 2022-05-31

## 2022-06-17 ENCOUNTER — OFFICE VISIT (OUTPATIENT)
Dept: RHEUMATOLOGY | Facility: CLINIC | Age: 72
End: 2022-06-17
Payer: MEDICARE

## 2022-06-17 ENCOUNTER — LAB VISIT (OUTPATIENT)
Dept: LAB | Facility: HOSPITAL | Age: 72
End: 2022-06-17
Attending: PHYSICIAN ASSISTANT
Payer: MEDICARE

## 2022-06-17 VITALS
WEIGHT: 229.25 LBS | HEIGHT: 67 IN | HEART RATE: 72 BPM | SYSTOLIC BLOOD PRESSURE: 133 MMHG | BODY MASS INDEX: 35.98 KG/M2 | RESPIRATION RATE: 16 BRPM | DIASTOLIC BLOOD PRESSURE: 71 MMHG

## 2022-06-17 DIAGNOSIS — M79.18 CHRONIC MUSCULOSKELETAL PAIN: ICD-10-CM

## 2022-06-17 DIAGNOSIS — M06.09 RHEUMATOID ARTHRITIS OF MULTIPLE SITES WITH NEGATIVE RHEUMATOID FACTOR: Chronic | ICD-10-CM

## 2022-06-17 DIAGNOSIS — M47.816 SPONDYLOSIS OF LUMBAR REGION WITHOUT MYELOPATHY OR RADICULOPATHY: Chronic | ICD-10-CM

## 2022-06-17 DIAGNOSIS — L40.9 PSORIASIS: Primary | ICD-10-CM

## 2022-06-17 DIAGNOSIS — G89.29 CHRONIC NEUROPATHIC PAIN: ICD-10-CM

## 2022-06-17 DIAGNOSIS — M79.2 CHRONIC NEUROPATHIC PAIN: ICD-10-CM

## 2022-06-17 DIAGNOSIS — G89.29 CHRONIC MUSCULOSKELETAL PAIN: ICD-10-CM

## 2022-06-17 LAB
ALBUMIN SERPL BCP-MCNC: 3.6 G/DL (ref 3.5–5.2)
ALP SERPL-CCNC: 88 U/L (ref 55–135)
ALT SERPL W/O P-5'-P-CCNC: 29 U/L (ref 10–44)
ANION GAP SERPL CALC-SCNC: 14 MMOL/L (ref 8–16)
AST SERPL-CCNC: 53 U/L (ref 10–40)
BASOPHILS # BLD AUTO: 0.04 K/UL (ref 0–0.2)
BASOPHILS NFR BLD: 0.5 % (ref 0–1.9)
BILIRUB SERPL-MCNC: 0.7 MG/DL (ref 0.1–1)
BUN SERPL-MCNC: 21 MG/DL (ref 8–23)
CALCIUM SERPL-MCNC: 8.9 MG/DL (ref 8.7–10.5)
CHLORIDE SERPL-SCNC: 105 MMOL/L (ref 95–110)
CO2 SERPL-SCNC: 19 MMOL/L (ref 23–29)
CREAT SERPL-MCNC: 1 MG/DL (ref 0.5–1.4)
CRP SERPL-MCNC: 24.1 MG/L (ref 0–8.2)
DIFFERENTIAL METHOD: ABNORMAL
EOSINOPHIL # BLD AUTO: 0.1 K/UL (ref 0–0.5)
EOSINOPHIL NFR BLD: 1.2 % (ref 0–8)
ERYTHROCYTE [DISTWIDTH] IN BLOOD BY AUTOMATED COUNT: 12.9 % (ref 11.5–14.5)
ERYTHROCYTE [SEDIMENTATION RATE] IN BLOOD BY WESTERGREN METHOD: 19 MM/HR (ref 0–23)
EST. GFR  (AFRICAN AMERICAN): >60 ML/MIN/1.73 M^2
EST. GFR  (NON AFRICAN AMERICAN): >60 ML/MIN/1.73 M^2
GLUCOSE SERPL-MCNC: 181 MG/DL (ref 70–110)
HCT VFR BLD AUTO: 40.5 % (ref 40–54)
HGB BLD-MCNC: 13.7 G/DL (ref 14–18)
IMM GRANULOCYTES # BLD AUTO: 0.07 K/UL (ref 0–0.04)
IMM GRANULOCYTES NFR BLD AUTO: 0.8 % (ref 0–0.5)
LYMPHOCYTES # BLD AUTO: 1.5 K/UL (ref 1–4.8)
LYMPHOCYTES NFR BLD: 17.5 % (ref 18–48)
MCH RBC QN AUTO: 30.4 PG (ref 27–31)
MCHC RBC AUTO-ENTMCNC: 33.8 G/DL (ref 32–36)
MCV RBC AUTO: 90 FL (ref 82–98)
MONOCYTES # BLD AUTO: 0.7 K/UL (ref 0.3–1)
MONOCYTES NFR BLD: 8.7 % (ref 4–15)
NEUTROPHILS # BLD AUTO: 6 K/UL (ref 1.8–7.7)
NEUTROPHILS NFR BLD: 71.3 % (ref 38–73)
NRBC BLD-RTO: 0 /100 WBC
PLATELET # BLD AUTO: 190 K/UL (ref 150–450)
PMV BLD AUTO: 11.1 FL (ref 9.2–12.9)
POTASSIUM SERPL-SCNC: 4.7 MMOL/L (ref 3.5–5.1)
PROT SERPL-MCNC: 7 G/DL (ref 6–8.4)
RBC # BLD AUTO: 4.5 M/UL (ref 4.6–6.2)
SODIUM SERPL-SCNC: 138 MMOL/L (ref 136–145)
WBC # BLD AUTO: 8.39 K/UL (ref 3.9–12.7)

## 2022-06-17 PROCEDURE — 3008F PR BODY MASS INDEX (BMI) DOCUMENTED: ICD-10-PCS | Mod: CPTII,S$GLB,, | Performed by: PHYSICIAN ASSISTANT

## 2022-06-17 PROCEDURE — 1125F AMNT PAIN NOTED PAIN PRSNT: CPT | Mod: CPTII,S$GLB,, | Performed by: PHYSICIAN ASSISTANT

## 2022-06-17 PROCEDURE — 3288F FALL RISK ASSESSMENT DOCD: CPT | Mod: CPTII,S$GLB,, | Performed by: PHYSICIAN ASSISTANT

## 2022-06-17 PROCEDURE — 3075F SYST BP GE 130 - 139MM HG: CPT | Mod: CPTII,S$GLB,, | Performed by: PHYSICIAN ASSISTANT

## 2022-06-17 PROCEDURE — 99999 PR PBB SHADOW E&M-EST. PATIENT-LVL IV: CPT | Mod: PBBFAC,,, | Performed by: PHYSICIAN ASSISTANT

## 2022-06-17 PROCEDURE — 85652 RBC SED RATE AUTOMATED: CPT | Performed by: PHYSICIAN ASSISTANT

## 2022-06-17 PROCEDURE — 1159F PR MEDICATION LIST DOCUMENTED IN MEDICAL RECORD: ICD-10-PCS | Mod: CPTII,S$GLB,, | Performed by: PHYSICIAN ASSISTANT

## 2022-06-17 PROCEDURE — 36415 COLL VENOUS BLD VENIPUNCTURE: CPT | Performed by: PHYSICIAN ASSISTANT

## 2022-06-17 PROCEDURE — 85025 COMPLETE CBC W/AUTO DIFF WBC: CPT | Performed by: PHYSICIAN ASSISTANT

## 2022-06-17 PROCEDURE — 1159F MED LIST DOCD IN RCRD: CPT | Mod: CPTII,S$GLB,, | Performed by: PHYSICIAN ASSISTANT

## 2022-06-17 PROCEDURE — 99214 PR OFFICE/OUTPT VISIT, EST, LEVL IV, 30-39 MIN: ICD-10-PCS | Mod: S$GLB,,, | Performed by: PHYSICIAN ASSISTANT

## 2022-06-17 PROCEDURE — 99214 OFFICE O/P EST MOD 30 MIN: CPT | Mod: S$GLB,,, | Performed by: PHYSICIAN ASSISTANT

## 2022-06-17 PROCEDURE — 99999 PR PBB SHADOW E&M-EST. PATIENT-LVL IV: ICD-10-PCS | Mod: PBBFAC,,, | Performed by: PHYSICIAN ASSISTANT

## 2022-06-17 PROCEDURE — 86140 C-REACTIVE PROTEIN: CPT | Performed by: PHYSICIAN ASSISTANT

## 2022-06-17 PROCEDURE — 3078F PR MOST RECENT DIASTOLIC BLOOD PRESSURE < 80 MM HG: ICD-10-PCS | Mod: CPTII,S$GLB,, | Performed by: PHYSICIAN ASSISTANT

## 2022-06-17 PROCEDURE — 3075F PR MOST RECENT SYSTOLIC BLOOD PRESS GE 130-139MM HG: ICD-10-PCS | Mod: CPTII,S$GLB,, | Performed by: PHYSICIAN ASSISTANT

## 2022-06-17 PROCEDURE — 1100F PR PT FALLS ASSESS DOC 2+ FALLS/FALL W/INJURY/YR: ICD-10-PCS | Mod: CPTII,S$GLB,, | Performed by: PHYSICIAN ASSISTANT

## 2022-06-17 PROCEDURE — 3008F BODY MASS INDEX DOCD: CPT | Mod: CPTII,S$GLB,, | Performed by: PHYSICIAN ASSISTANT

## 2022-06-17 PROCEDURE — 80053 COMPREHEN METABOLIC PANEL: CPT | Performed by: PHYSICIAN ASSISTANT

## 2022-06-17 PROCEDURE — 1125F PR PAIN SEVERITY QUANTIFIED, PAIN PRESENT: ICD-10-PCS | Mod: CPTII,S$GLB,, | Performed by: PHYSICIAN ASSISTANT

## 2022-06-17 PROCEDURE — 3288F PR FALLS RISK ASSESSMENT DOCUMENTED: ICD-10-PCS | Mod: CPTII,S$GLB,, | Performed by: PHYSICIAN ASSISTANT

## 2022-06-17 PROCEDURE — 3078F DIAST BP <80 MM HG: CPT | Mod: CPTII,S$GLB,, | Performed by: PHYSICIAN ASSISTANT

## 2022-06-17 PROCEDURE — 1100F PTFALLS ASSESS-DOCD GE2>/YR: CPT | Mod: CPTII,S$GLB,, | Performed by: PHYSICIAN ASSISTANT

## 2022-06-17 RX ORDER — GABAPENTIN 800 MG/1
800 TABLET ORAL 3 TIMES DAILY
Qty: 270 TABLET | Refills: 3 | Status: SHIPPED | OUTPATIENT
Start: 2022-06-17

## 2022-06-17 RX ORDER — HYDROXYCHLOROQUINE SULFATE 200 MG/1
200 TABLET, FILM COATED ORAL 2 TIMES DAILY
Qty: 180 TABLET | Refills: 3 | Status: SHIPPED | OUTPATIENT
Start: 2022-06-17

## 2022-06-17 NOTE — PROGRESS NOTES
RHEUMATOLOGY OUTPATIENT CLINIC NOTE    6/17/2022    Attending Rheumatologist: Rosangela Hampton PA-C  Primary Care Provider: Suzette Alvarez MD   Chief Complaint/Reason For Consultation:  Rheumatoid Arthritis      Subjective:        Thomas Sarkar is a 71 y.o. male here today for a 3 month follow up.  History treatment resistant seronegative RA with psoriasis overlap and chronic unspecified Colitis.  Most recent MRI of the hands and wrist did not show any synovitis or tenosynovitis.  It did show extensive osteoarthritis. Skin doing fair psoriasis comes and goes but under control for most part. Hx Advanced OA kaushik hips; s/p R FAIZAN. Physical exam shows no active synovitis. Rheumatologic systems otherwise negative.    Now on gabapentin 800 mg tid, and Cymbalta 60 mg Q day; his neurologist does not want him on higher cymbalta deniz to his parkinson's dz. on sinemet seeing DR Espinosa at [facility] [place].  Needs a refill of gabapentin.  Patient has never tried Plaquenil.    Serologies  Lab Results   Component Value Date    TBGOLDPLUS Negative 03/09/2022     Lab Results   Component Value Date    DALTON Negative 08/31/2010     Lab Results   Component Value Date    RF Negative 08/31/2010     Lab Results   Component Value Date    HEPAIGM Negative 01/20/2022    HEPBIGM Negative 01/20/2022    HEPBCAB Negative 10/01/2018    HEPCAB Negative 01/20/2022         Current Rheum Medications:  · none  Previous Rheum Medications:   · MTX monotherapy, SSZ, remicade, enbrel, humira, cimzia, xeljanz, actemra (increased LFTs and decreased platelets), orencia, rinvoq    Past Medical History:   Diagnosis Date    Acid reflux     Allergy     Arthritis     PSORIATIC    Cataract     CHF (congestive heart failure)     Chronic kidney disease     Diabetes mellitus, type 2     Dry eyes     Glaucoma     SECONDARY TO STEROIDS    Heart murmur     Hyperlipidemia     Myocardial infarction     Parkinson's disease     Rheumatoid arthritis(714.0)   "   Scalp tenderness     FROM PSORIASIS     Social History     Socioeconomic History    Marital status:    Tobacco Use    Smoking status: Former Smoker     Types: Cigarettes     Quit date: 1970     Years since quittin.9    Smokeless tobacco: Never Used   Substance and Sexual Activity    Alcohol use: Not Currently    Drug use: No    Sexual activity: Yes     Partners: Female     Review of patient's allergies indicates:   Allergen Reactions    Bactrim [sulfamethoxazole-trimethoprim]      Heart attack/kidney shut down    Iodinated contrast media      "Renal failure"    Tetanus vaccines and toxoid Other (See Comments)     Flu like symptoms (old style tetanus-with horse serum)    Corticosteroids (glucocorticoids)      Builds up occular pressure    Dye      Contrast dye for CAT scans etc. Caused renal failure       Objective:   /71   Pulse 72   Resp 16   Ht 5' 7" (1.702 m)   Wt 104 kg (229 lb 4.5 oz)   BMI 35.91 kg/m²     Immunization History   Administered Date(s) Administered    Influenza 10/02/2018    Influenza (FLUAD) - Quadrivalent - Adjuvanted - PF *Preferred* (65+) 11/10/2020    Influenza (FLUAD) - Trivalent - Adjuvanted - PF (65+) 2022    Influenza - High Dose - PF (65 years and older) 10/30/2014, 10/28/2015, 10/11/2016    Influenza - Quadrivalent 10/30/2017    Influenza - Quadrivalent - PF *Preferred* (6 months and older) 2013, 10/30/2017, 10/02/2018, 10/02/2019    Influenza Split 2013    PPD Test 2012, 10/28/2015    Pneumococcal Conjugate - 13 Valent 2013, 2013    Pneumococcal Polysaccharide - 23 Valent 2014, 03/10/2020    Tdap 2013    Zoster 2013    Zoster Recombinant 2020       Current Outpatient Medications:     aspirin (ECOTRIN) 81 MG EC tablet, Take 81 mg by mouth once daily., Disp: , Rfl:     betamethasone dipropionate 0.05 % cream, Apply topically., Disp: , Rfl:     carbidopa-levodopa  mg " (SINEMET)  mg per tablet, Take 1 tablet by mouth 3 (three) times daily., Disp: , Rfl:     clonazePAM (KLONOPIN) 1 MG tablet, Take 1 mg by mouth every evening., Disp: , Rfl:     dicyclomine (BENTYL) 20 mg tablet, Take 20 mg by mouth 4 (four) times daily., Disp: , Rfl:     DULoxetine (CYMBALTA) 60 MG capsule, Take 1 capsule (60 mg total) by mouth once daily., Disp: 30 capsule, Rfl: 0    fish oil-omega-3 fatty acids 300-1,000 mg capsule, Take 1 g by mouth once daily., Disp: , Rfl:     metFORMIN (GLUCOPHAGE-XR) 500 MG ER 24hr tablet, AT THE START OF THERAPY TAKE 1 TABLET AT BEDTIME FOR ONE WEEK THEN INCREASE TO TWICE A DAY THEREAFTER, Disp: 180 tablet, Rfl: 3    methylcellulose, laxative, 500 mg Tab, Take by mouth once daily., Disp: , Rfl:     metoprolol succinate (TOPROL-XL) 50 MG 24 hr tablet, 1 tablet., Disp: , Rfl:     multivitamin capsule, Take 1 capsule by mouth., Disp: , Rfl:     pantoprazole (PROTONIX) 40 MG tablet, 40 mg once daily., Disp: , Rfl:     tamsulosin (FLOMAX) 0.4 mg Cap, Take 1 capsule (0.4 mg total) by mouth once daily., Disp: 90 capsule, Rfl: 3    vitamin D (VITAMIN D3) 1000 units Tab, Take 2,000 Units by mouth once daily., Disp: , Rfl:     gabapentin (NEURONTIN) 800 MG tablet, Take 1 tablet (800 mg total) by mouth 3 (three) times daily., Disp: 270 tablet, Rfl: 3    hydrOXYchloroQUINE (PLAQUENIL) 200 mg tablet, Take 1 tablet (200 mg total) by mouth 2 (two) times daily., Disp: 180 tablet, Rfl: 3      Imaging:  All imaging reviewed and independently interpreted by me.    XR HAND COMPLETE 3 VIEWS BILATERAL 1/20/22  FINDINGS:  Stable appearance of a metallic foreign body adjacent to the middle phalanx of the 2nd digit on the right.  Stable degenerative changes seen scattered throughout the interphalangeal joints bilaterally.  No erosive changes are noted.  There also some degenerative changes seen involving the MCP joints bilaterally that are also similar in appearance to the prior  study and greatest at the 1st through 3rd MCP joints on the left and at the 1st, 3rd and 5th MCP joints on the right    XR FOOT COMPLETE 3 VIEW BILATERAL 1/20/22  FINDINGS:  Moderate to severe degenerative change seen the bilateral 1st MTP joints.  No acute fracture or dislocation.  Small bilateral plantar calcaneal enthesophytes are noted.  No erosive changes are suggested.  There is a subchondral cyst seen within the 5th metatarsal head on the left.    Assessment:     1. Psoriasis    2. Rheumatoid arthritis of multiple sites with negative rheumatoid factor    3. Chronic musculoskeletal pain    4. Spondylosis of lumbar region without myelopathy or radiculopathy    5. Chronic neuropathic pain          Plan:     · Seronegative RA w/ psoriasis overlap.  Imaging now more consistent with burnout disease.  · Begin Plaquenil 1 p.o. b.i.d. for erosive osteoarthritis.  · Also can consider use of prograf starting with 1mg BID.  · Compromised immune system secondary to autoimmune disease and use of immunosuppressive drugs. Monitor carefully for infections and toxicities- Currently denies issues with recurrent infections. Advised to get immediate medical care if any infection.  · Recommend Yearly Skin Cancer Screening by Dermatology for all patients on biologic or Altagracia. advised strict adherence to age appropriate vaccinations (shingles, pneumonia, flu and covid) and cancer screenings with PCP   · Patient advised to hold DMARD and/or biologic therapy for signs of infection or for surgery. If you are unsure what to do please call our office for instruction.[facility] [phone]  · no current medication related issues, no evidence of toxicity. I ordered labs for toxicity monitoring;  results reviewed and discussed findings with the patient   · Return to clinic: 5 mos w/ early reg 4 and prn    The patient understands, chooses and consents to this plan and accepts all the risks which include but are not limited  to the risks mentioned above.     Method of contact with patient concerns: [facility]    60 minutes of total time spent on the encounter, which includes face to face time and non-face to face time preparing to see the patient (eg, review of tests), Obtaining and/or reviewing separately obtained history, Documenting clinical information in the electronic or other health record, Independently interpreting results (not separately reported) and communicating results to the patient/family/caregiver, or Care coordination (not separately reported).           Rosangela Hampton PA-C  Rheumatology Department   [facility] [place]

## 2022-06-20 ENCOUNTER — PATIENT MESSAGE (OUTPATIENT)
Dept: RHEUMATOLOGY | Facility: CLINIC | Age: 72
End: 2022-06-20
Payer: MEDICARE

## 2022-07-16 DIAGNOSIS — M06.09 RHEUMATOID ARTHRITIS OF MULTIPLE SITES WITH NEGATIVE RHEUMATOID FACTOR: Chronic | ICD-10-CM

## 2022-07-16 DIAGNOSIS — M47.816 SPONDYLOSIS OF LUMBAR REGION WITHOUT MYELOPATHY OR RADICULOPATHY: Chronic | ICD-10-CM

## 2022-07-16 DIAGNOSIS — M79.18 CHRONIC MUSCULOSKELETAL PAIN: ICD-10-CM

## 2022-07-16 DIAGNOSIS — M17.0 PRIMARY OSTEOARTHRITIS OF BOTH KNEES: ICD-10-CM

## 2022-07-16 DIAGNOSIS — G89.29 CHRONIC MUSCULOSKELETAL PAIN: ICD-10-CM

## 2022-07-18 RX ORDER — DULOXETIN HYDROCHLORIDE 60 MG/1
CAPSULE, DELAYED RELEASE ORAL
Qty: 90 CAPSULE | Refills: 3 | Status: SHIPPED | OUTPATIENT
Start: 2022-07-18 | End: 2023-06-26

## 2022-11-17 ENCOUNTER — LAB VISIT (OUTPATIENT)
Dept: LAB | Facility: HOSPITAL | Age: 72
End: 2022-11-17
Attending: FAMILY MEDICINE
Payer: MEDICARE

## 2022-11-17 ENCOUNTER — OFFICE VISIT (OUTPATIENT)
Dept: RHEUMATOLOGY | Facility: CLINIC | Age: 72
End: 2022-11-17
Payer: MEDICARE

## 2022-11-17 VITALS
WEIGHT: 219.13 LBS | HEIGHT: 67 IN | BODY MASS INDEX: 34.39 KG/M2 | SYSTOLIC BLOOD PRESSURE: 139 MMHG | HEART RATE: 91 BPM | DIASTOLIC BLOOD PRESSURE: 84 MMHG

## 2022-11-17 DIAGNOSIS — M15.9 PRIMARY OSTEOARTHRITIS INVOLVING MULTIPLE JOINTS: ICD-10-CM

## 2022-11-17 DIAGNOSIS — M06.09 RHEUMATOID ARTHRITIS OF MULTIPLE SITES WITH NEGATIVE RHEUMATOID FACTOR: Chronic | ICD-10-CM

## 2022-11-17 DIAGNOSIS — M06.09 RHEUMATOID ARTHRITIS OF MULTIPLE SITES WITH NEGATIVE RHEUMATOID FACTOR: Primary | ICD-10-CM

## 2022-11-17 DIAGNOSIS — L40.9 PSORIASIS: ICD-10-CM

## 2022-11-17 LAB
ALBUMIN SERPL BCP-MCNC: 3.2 G/DL (ref 3.5–5.2)
ALP SERPL-CCNC: 77 U/L (ref 55–135)
ALT SERPL W/O P-5'-P-CCNC: 9 U/L (ref 10–44)
ANION GAP SERPL CALC-SCNC: 11 MMOL/L (ref 8–16)
AST SERPL-CCNC: 21 U/L (ref 10–40)
BASOPHILS # BLD AUTO: 0.07 K/UL (ref 0–0.2)
BASOPHILS NFR BLD: 0.7 % (ref 0–1.9)
BILIRUB SERPL-MCNC: 0.6 MG/DL (ref 0.1–1)
BUN SERPL-MCNC: 13 MG/DL (ref 8–23)
CALCIUM SERPL-MCNC: 9.9 MG/DL (ref 8.7–10.5)
CHLORIDE SERPL-SCNC: 101 MMOL/L (ref 95–110)
CO2 SERPL-SCNC: 24 MMOL/L (ref 23–29)
CREAT SERPL-MCNC: 1.2 MG/DL (ref 0.5–1.4)
CRP SERPL-MCNC: 82.3 MG/L (ref 0–8.2)
DIFFERENTIAL METHOD: ABNORMAL
EOSINOPHIL # BLD AUTO: 0.1 K/UL (ref 0–0.5)
EOSINOPHIL NFR BLD: 0.9 % (ref 0–8)
ERYTHROCYTE [DISTWIDTH] IN BLOOD BY AUTOMATED COUNT: 12.7 % (ref 11.5–14.5)
ERYTHROCYTE [SEDIMENTATION RATE] IN BLOOD BY PHOTOMETRIC METHOD: 54 MM/HR (ref 0–23)
EST. GFR  (NO RACE VARIABLE): >60 ML/MIN/1.73 M^2
GLUCOSE SERPL-MCNC: 143 MG/DL (ref 70–110)
HCT VFR BLD AUTO: 38.2 % (ref 40–54)
HGB BLD-MCNC: 12.6 G/DL (ref 14–18)
IMM GRANULOCYTES # BLD AUTO: 0.08 K/UL (ref 0–0.04)
IMM GRANULOCYTES NFR BLD AUTO: 0.8 % (ref 0–0.5)
LYMPHOCYTES # BLD AUTO: 1.5 K/UL (ref 1–4.8)
LYMPHOCYTES NFR BLD: 15.3 % (ref 18–48)
MCH RBC QN AUTO: 29.7 PG (ref 27–31)
MCHC RBC AUTO-ENTMCNC: 33 G/DL (ref 32–36)
MCV RBC AUTO: 90 FL (ref 82–98)
MONOCYTES # BLD AUTO: 1 K/UL (ref 0.3–1)
MONOCYTES NFR BLD: 10.6 % (ref 4–15)
NEUTROPHILS # BLD AUTO: 6.9 K/UL (ref 1.8–7.7)
NEUTROPHILS NFR BLD: 71.7 % (ref 38–73)
NRBC BLD-RTO: 0 /100 WBC
PLATELET # BLD AUTO: 346 K/UL (ref 150–450)
PMV BLD AUTO: 10 FL (ref 9.2–12.9)
POTASSIUM SERPL-SCNC: 4.6 MMOL/L (ref 3.5–5.1)
PROT SERPL-MCNC: 7.8 G/DL (ref 6–8.4)
RBC # BLD AUTO: 4.24 M/UL (ref 4.6–6.2)
SODIUM SERPL-SCNC: 136 MMOL/L (ref 136–145)
WBC # BLD AUTO: 9.6 K/UL (ref 3.9–12.7)

## 2022-11-17 PROCEDURE — 36415 COLL VENOUS BLD VENIPUNCTURE: CPT | Performed by: PHYSICIAN ASSISTANT

## 2022-11-17 PROCEDURE — 80053 COMPREHEN METABOLIC PANEL: CPT | Performed by: PHYSICIAN ASSISTANT

## 2022-11-17 PROCEDURE — 1125F AMNT PAIN NOTED PAIN PRSNT: CPT | Mod: CPTII,S$GLB,, | Performed by: PHYSICIAN ASSISTANT

## 2022-11-17 PROCEDURE — 1159F MED LIST DOCD IN RCRD: CPT | Mod: CPTII,S$GLB,, | Performed by: PHYSICIAN ASSISTANT

## 2022-11-17 PROCEDURE — 99214 OFFICE O/P EST MOD 30 MIN: CPT | Mod: S$GLB,,, | Performed by: PHYSICIAN ASSISTANT

## 2022-11-17 PROCEDURE — 99214 PR OFFICE/OUTPT VISIT, EST, LEVL IV, 30-39 MIN: ICD-10-PCS | Mod: S$GLB,,, | Performed by: PHYSICIAN ASSISTANT

## 2022-11-17 PROCEDURE — 85652 RBC SED RATE AUTOMATED: CPT | Performed by: PHYSICIAN ASSISTANT

## 2022-11-17 PROCEDURE — 3288F FALL RISK ASSESSMENT DOCD: CPT | Mod: CPTII,S$GLB,, | Performed by: PHYSICIAN ASSISTANT

## 2022-11-17 PROCEDURE — 86140 C-REACTIVE PROTEIN: CPT | Performed by: PHYSICIAN ASSISTANT

## 2022-11-17 PROCEDURE — 3288F PR FALLS RISK ASSESSMENT DOCUMENTED: ICD-10-PCS | Mod: CPTII,S$GLB,, | Performed by: PHYSICIAN ASSISTANT

## 2022-11-17 PROCEDURE — 1100F PR PT FALLS ASSESS DOC 2+ FALLS/FALL W/INJURY/YR: ICD-10-PCS | Mod: CPTII,S$GLB,, | Performed by: PHYSICIAN ASSISTANT

## 2022-11-17 PROCEDURE — 3079F DIAST BP 80-89 MM HG: CPT | Mod: CPTII,S$GLB,, | Performed by: PHYSICIAN ASSISTANT

## 2022-11-17 PROCEDURE — 3075F PR MOST RECENT SYSTOLIC BLOOD PRESS GE 130-139MM HG: ICD-10-PCS | Mod: CPTII,S$GLB,, | Performed by: PHYSICIAN ASSISTANT

## 2022-11-17 PROCEDURE — 1159F PR MEDICATION LIST DOCUMENTED IN MEDICAL RECORD: ICD-10-PCS | Mod: CPTII,S$GLB,, | Performed by: PHYSICIAN ASSISTANT

## 2022-11-17 PROCEDURE — 1100F PTFALLS ASSESS-DOCD GE2>/YR: CPT | Mod: CPTII,S$GLB,, | Performed by: PHYSICIAN ASSISTANT

## 2022-11-17 PROCEDURE — 3008F BODY MASS INDEX DOCD: CPT | Mod: CPTII,S$GLB,, | Performed by: PHYSICIAN ASSISTANT

## 2022-11-17 PROCEDURE — 3075F SYST BP GE 130 - 139MM HG: CPT | Mod: CPTII,S$GLB,, | Performed by: PHYSICIAN ASSISTANT

## 2022-11-17 PROCEDURE — 3008F PR BODY MASS INDEX (BMI) DOCUMENTED: ICD-10-PCS | Mod: CPTII,S$GLB,, | Performed by: PHYSICIAN ASSISTANT

## 2022-11-17 PROCEDURE — 3079F PR MOST RECENT DIASTOLIC BLOOD PRESSURE 80-89 MM HG: ICD-10-PCS | Mod: CPTII,S$GLB,, | Performed by: PHYSICIAN ASSISTANT

## 2022-11-17 PROCEDURE — 1125F PR PAIN SEVERITY QUANTIFIED, PAIN PRESENT: ICD-10-PCS | Mod: CPTII,S$GLB,, | Performed by: PHYSICIAN ASSISTANT

## 2022-11-17 PROCEDURE — 99999 PR PBB SHADOW E&M-EST. PATIENT-LVL IV: CPT | Mod: PBBFAC,,, | Performed by: PHYSICIAN ASSISTANT

## 2022-11-17 PROCEDURE — 99999 PR PBB SHADOW E&M-EST. PATIENT-LVL IV: ICD-10-PCS | Mod: PBBFAC,,, | Performed by: PHYSICIAN ASSISTANT

## 2022-11-17 PROCEDURE — 85025 COMPLETE CBC W/AUTO DIFF WBC: CPT | Performed by: PHYSICIAN ASSISTANT

## 2022-11-17 RX ORDER — EVOLOCUMAB 140 MG/ML
INJECTION, SOLUTION SUBCUTANEOUS
COMMUNITY
Start: 2022-10-19

## 2022-11-17 NOTE — PROGRESS NOTES
RHEUMATOLOGY OUTPATIENT CLINIC NOTE    Attending Rheumatologist: Rosangela Hampton PA-C  Primary Care Provider: Suzette Alvarez MD   Chief Complaint/Reason For Consultation:  inflammatory arthritis      Subjective:        Thomas Sarkar III is a 72 y.o. male here today for a 3 month follow up.  History treatment resistant seronegative RA with psoriasis overlap and chronic unspecified Colitis.  Most recent MRI of the hands and wrist did not show any synovitis or tenosynovitis.  It did show extensive osteoarthritis. Skin doing fair psoriasis comes and goes but under control for most part. Hx Advanced OA kaushik hips; s/p R FAIZAN. Physical exam shows no active synovitis. Rheumatologic systems otherwise negative.    Now on gabapentin 800 mg tid, and Cymbalta 60 mg Q day; his neurologist does not want him on higher cymbalta deniz to his parkinson's dz. on sinemet seeing DR Espinosa at [facility].  Needs a refill of gabapentin.      Serologies  Lab Results   Component Value Date    TBGOLDPLUS Negative 03/09/2022     Lab Results   Component Value Date    DALTON Negative 08/31/2010     Lab Results   Component Value Date    RF Negative 08/31/2010     Lab Results   Component Value Date    HEPAIGM Negative 01/20/2022    HEPBIGM Negative 01/20/2022    HEPBCAB Negative 10/01/2018    HEPCAB Negative 01/20/2022        Current Rheum Medications:  none  Previous Rheum Medications:   MTX monotherapy, SSZ, remicade, enbrel, humira, cimzia, xeljanz, actemra (increased LFTs and decreased platelets), orencia, rinvoq    Past Medical History:   Diagnosis Date    Acid reflux     Allergy     Arthritis     PSORIATIC    Cataract     CHF (congestive heart failure)     Chronic kidney disease     Diabetes mellitus, type 2     Dry eyes     Glaucoma     SECONDARY TO STEROIDS    Heart murmur     Hyperlipidemia     Myocardial infarction     Parkinson's disease     Rheumatoid arthritis(714.0)     Scalp tenderness     FROM PSORIASIS     Social History  "    Tobacco Use    Smoking status: Former     Types: Cigarettes     Quit date: 1970     Years since quittin.4    Smokeless tobacco: Never   Substance Use Topics    Alcohol use: Not Currently       Review of patient's allergies indicates:   Allergen Reactions    Bactrim [sulfamethoxazole-trimethoprim]      Heart attack/kidney shut down    Iodinated contrast media      "Renal failure"    Tetanus vaccines and toxoid Other (See Comments)     Flu like symptoms (old style tetanus-with horse serum)    Corticosteroids (glucocorticoids)      Builds up occular pressure    Dye      Contrast dye for CAT scans etc. Caused renal failure       Objective:   /84   Pulse 91   Ht 5' 7" (1.702 m)   Wt 99.4 kg (219 lb 2.2 oz)   BMI 34.32 kg/m²     Immunization History   Administered Date(s) Administered    Influenza 10/02/2018    Influenza (FLUAD) - Quadrivalent - Adjuvanted - PF *Preferred* (65+) 11/10/2020    Influenza (FLUAD) - Trivalent - Adjuvanted - PF (65+) 2022    Influenza - High Dose - PF (65 years and older) 10/30/2014, 10/28/2015, 10/11/2016    Influenza - Quadrivalent 10/30/2017    Influenza - Quadrivalent - PF *Preferred* (6 months and older) 2013, 10/30/2017, 10/02/2018, 10/02/2019    Influenza Split 2013    PPD Test 2012, 10/28/2015    Pneumococcal Conjugate - 13 Valent 2013, 2013    Pneumococcal Polysaccharide - 23 Valent 2014, 03/10/2020    Tdap 2013    Zoster 2013    Zoster Recombinant 2020       Current Outpatient Medications:     aspirin (ECOTRIN) 81 MG EC tablet, Take 81 mg by mouth once daily., Disp: , Rfl:     betamethasone dipropionate 0.05 % cream, Apply topically., Disp: , Rfl:     carbidopa-levodopa  mg (SINEMET)  mg per tablet, Take 1 tablet by mouth 3 (three) times daily., Disp: , Rfl:     clonazePAM (KLONOPIN) 1 MG tablet, Take 1 mg by mouth every evening., Disp: , Rfl:     dicyclomine (BENTYL) 20 mg tablet, Take 20 " mg by mouth 4 (four) times daily., Disp: , Rfl:     DULoxetine (CYMBALTA) 60 MG capsule, TAKE 1 CAPSULE DAILY, Disp: 90 capsule, Rfl: 3    fish oil-omega-3 fatty acids 300-1,000 mg capsule, Take 1 g by mouth once daily., Disp: , Rfl:     gabapentin (NEURONTIN) 800 MG tablet, Take 1 tablet (800 mg total) by mouth 3 (three) times daily., Disp: 270 tablet, Rfl: 3    hydrOXYchloroQUINE (PLAQUENIL) 200 mg tablet, Take 1 tablet (200 mg total) by mouth 2 (two) times daily., Disp: 180 tablet, Rfl: 3    metFORMIN (GLUCOPHAGE-XR) 500 MG ER 24hr tablet, AT THE START OF THERAPY TAKE 1 TABLET AT BEDTIME FOR ONE WEEK THEN INCREASE TO TWICE A DAY THEREAFTER, Disp: 180 tablet, Rfl: 3    methylcellulose, laxative, 500 mg Tab, Take by mouth once daily., Disp: , Rfl:     metoprolol succinate (TOPROL-XL) 50 MG 24 hr tablet, 1 tablet., Disp: , Rfl:     multivitamin capsule, Take 1 capsule by mouth., Disp: , Rfl:     pantoprazole (PROTONIX) 40 MG tablet, 40 mg once daily., Disp: , Rfl:     REPATHA SURECLICK 140 mg/mL PnIj, SMARTSI Milligram(s) SUB-Q Every 2 Weeks, Disp: , Rfl:     vitamin D (VITAMIN D3) 1000 units Tab, Take 2,000 Units by mouth once daily., Disp: , Rfl:     tamsulosin (FLOMAX) 0.4 mg Cap, TAKE 1 CAPSULE DAILY, Disp: 90 capsule, Rfl: 3      Imaging:  All imaging reviewed and independently interpreted by me.    XR HAND COMPLETE 3 VIEWS BILATERAL 22  FINDINGS:  Stable appearance of a metallic foreign body adjacent to the middle phalanx of the 2nd digit on the right.  Stable degenerative changes seen scattered throughout the interphalangeal joints bilaterally.  No erosive changes are noted.  There also some degenerative changes seen involving the MCP joints bilaterally that are also similar in appearance to the prior study and greatest at the 1st through 3rd MCP joints on the left and at the 1st, 3rd and 5th MCP joints on the right    XR FOOT COMPLETE 3 VIEW BILATERAL 22  FINDINGS:  Moderate to severe  degenerative change seen the bilateral 1st MTP joints.  No acute fracture or dislocation.  Small bilateral plantar calcaneal enthesophytes are noted.  No erosive changes are suggested.  There is a subchondral cyst seen within the 5th metatarsal head on the left.    MRI Wrist Joint W WO Contrast Right  Narrative: EXAMINATION:  MRI HAND FINGERS W WO CONTRAST RIGHT; MRI WRIST W WO CONTRAST RIGHT    CLINICAL HISTORY:  Hand pain, chronic, rheumatoid arthritis suspected;seronegative RA with psoriatic arthritis overlap; increased pain;; Wrist pain, persistent, neg xray;hx seronegative RA with psoriatic arthritis overlap; worsening pain;  Pain in right hand; Pain in right wrist    TECHNIQUE:  Multiplanar multisequence MR imaging of the right hand and wrist was performed with and without contrast.  10 cc of Gadavist was administered without immediate complication.    COMPARISON:  Plain films from 01/20/2022    FINDINGS:  Signal loss seen adjacent to the middle phalanx of the 2nd digit due to known metallic foreign body.    Degenerative changes seen scattered throughout the interphalangeal joints and MCP joints and greatest at the 1st and 3rd MCP joints where there are some subchondral cystic changes.  There also some subchondral marrow edema changes and subchondral cystic changes seen at the interphalangeal joint of the 1st digit.  Enhancement seen on the postcontrast images at the 1st interphalangeal joint, 1st MCP joint and 3rd MCP joint due to reactive marrow edema and subchondral cystic changes..  No obvious synovitis seen associated with the hand or wrist.  The extensor carpi ulnaris appears to demonstrates intra tendinous intermediate signal intensity just to distal to the ulna suggesting tendinosis.  No evidence to suggest tenosynovitis.  Impression: No evidence to suggest synovitis or obvious erosions.  Extensive degenerative changes that are greatest at the 1st interphalangeal joint, 1st MCP joint and 3rd MCP  joint.    Electronically signed by: Rico Alfred DO  Date:    04/29/2022  Time:    11:38  MRI Hand Fingers W WO Contrast Right  Narrative: EXAMINATION:  MRI HAND FINGERS W WO CONTRAST RIGHT; MRI WRIST W WO CONTRAST RIGHT    CLINICAL HISTORY:  Hand pain, chronic, rheumatoid arthritis suspected;seronegative RA with psoriatic arthritis overlap; increased pain;; Wrist pain, persistent, neg xray;hx seronegative RA with psoriatic arthritis overlap; worsening pain;  Pain in right hand; Pain in right wrist    TECHNIQUE:  Multiplanar multisequence MR imaging of the right hand and wrist was performed with and without contrast.  10 cc of Gadavist was administered without immediate complication.    COMPARISON:  Plain films from 01/20/2022    FINDINGS:  Signal loss seen adjacent to the middle phalanx of the 2nd digit due to known metallic foreign body.    Degenerative changes seen scattered throughout the interphalangeal joints and MCP joints and greatest at the 1st and 3rd MCP joints where there are some subchondral cystic changes.  There also some subchondral marrow edema changes and subchondral cystic changes seen at the interphalangeal joint of the 1st digit.  Enhancement seen on the postcontrast images at the 1st interphalangeal joint, 1st MCP joint and 3rd MCP joint due to reactive marrow edema and subchondral cystic changes..  No obvious synovitis seen associated with the hand or wrist.  The extensor carpi ulnaris appears to demonstrates intra tendinous intermediate signal intensity just to distal to the ulna suggesting tendinosis.  No evidence to suggest tenosynovitis.  Impression: No evidence to suggest synovitis or obvious erosions.  Extensive degenerative changes that are greatest at the 1st interphalangeal joint, 1st MCP joint and 3rd MCP joint.    Electronically signed by: Rico Alfred DO  Date:    04/29/2022  Time:    11:38     Assessment:     1. Rheumatoid arthritis of multiple sites with negative rheumatoid  factor    2. Psoriasis    3. Primary osteoarthritis involving multiple joints            Plan:     Seronegative RA w/ psoriasis overlap.  Imaging now more consistent with burnout disease. Sx more c/w erosive OA- no change w/ HCQ  Also can consider use of prograf starting with 1mg BID.  no current medication related issues, no evidence of toxicity. I ordered labs for toxicity monitoring;  results reviewed and discussed findings with the patient   Return to clinic: 6 weeks w/ Dr PERRY    The patient understands, chooses and consents to this plan and accepts all the risks which include but are not limited to the risks mentioned above.     Method of contact with patient concerns: Issac attn Rheumatology    30 minutes of total time spent on the encounter, which includes face to face time and non-face to face time preparing to see the patient (eg, review of tests), Obtaining and/or reviewing separately obtained history, Documenting clinical information in the electronic or other health record, Independently interpreting results (not separately reported) and communicating results to the patient/family/caregiver, or Care coordination (not separately reported).           Rosangela Hampton PA-C  Rheumatology Department   [facility] [place]

## 2022-12-27 ENCOUNTER — PATIENT MESSAGE (OUTPATIENT)
Dept: RHEUMATOLOGY | Facility: CLINIC | Age: 72
End: 2022-12-27
Payer: MEDICARE

## 2023-05-31 NOTE — TELEPHONE ENCOUNTER
refaxed PA   Instructions: This plan will send the code FBSE to the PM system.  DO NOT or CHANGE the price. Detail Level: Simple Price (Do Not Change): 0.00

## 2023-06-25 DIAGNOSIS — G89.29 CHRONIC MUSCULOSKELETAL PAIN: ICD-10-CM

## 2023-06-25 DIAGNOSIS — M06.09 RHEUMATOID ARTHRITIS OF MULTIPLE SITES WITH NEGATIVE RHEUMATOID FACTOR: Chronic | ICD-10-CM

## 2023-06-25 DIAGNOSIS — M79.18 CHRONIC MUSCULOSKELETAL PAIN: ICD-10-CM

## 2023-06-25 DIAGNOSIS — M17.0 PRIMARY OSTEOARTHRITIS OF BOTH KNEES: ICD-10-CM

## 2023-06-25 DIAGNOSIS — M47.816 SPONDYLOSIS OF LUMBAR REGION WITHOUT MYELOPATHY OR RADICULOPATHY: Chronic | ICD-10-CM

## 2023-06-26 RX ORDER — DULOXETIN HYDROCHLORIDE 60 MG/1
CAPSULE, DELAYED RELEASE ORAL
Qty: 90 CAPSULE | Refills: 3 | Status: SHIPPED | OUTPATIENT
Start: 2023-06-26

## 2024-06-18 DIAGNOSIS — G89.29 CHRONIC MUSCULOSKELETAL PAIN: ICD-10-CM

## 2024-06-18 DIAGNOSIS — M79.18 CHRONIC MUSCULOSKELETAL PAIN: ICD-10-CM

## 2024-06-18 DIAGNOSIS — M47.816 SPONDYLOSIS OF LUMBAR REGION WITHOUT MYELOPATHY OR RADICULOPATHY: Chronic | ICD-10-CM

## 2024-06-18 DIAGNOSIS — M17.0 PRIMARY OSTEOARTHRITIS OF BOTH KNEES: ICD-10-CM

## 2024-06-18 DIAGNOSIS — M06.09 RHEUMATOID ARTHRITIS OF MULTIPLE SITES WITH NEGATIVE RHEUMATOID FACTOR: Chronic | ICD-10-CM

## 2024-06-18 RX ORDER — DULOXETIN HYDROCHLORIDE 60 MG/1
CAPSULE, DELAYED RELEASE ORAL
Qty: 90 CAPSULE | Refills: 3 | Status: SHIPPED | OUTPATIENT
Start: 2024-06-18

## 2024-12-19 NOTE — PROGRESS NOTES
December 19, 2024       Ba Hernandez MD  [address]  [address]  [facility] [address]  Via Fax: [phone]      Patient: Deirdre Singh   YOB: 2005   Date of Visit: 12/19/2024       Dear Dr. Hernandez:    Thank you for referring Deirdre Singh to me for evaluation. Below are my notes for this visit with her.    If you have questions, please do not hesitate to call me. I look forward to following your patient along with you.      Sincerely,        Gregoria Cain PA-C        CC: No Recipients    Gregoria Cain PA-C  12/19/2024  2:38 PM  Signed  ENDOCRINOLOGY CLINIC NOTE  [facility] [place] [address] [address]  [address]  [address]  [facility] [address]  Dept Phone: [phone]    Referring physician  Ba Hernandez MD       Chief Complaint:  Office Visit and Diabetes Mellitus         History of Present Illness    Ms. Singh is a 19 year old  female with PMHx of T1DM since 2018 presents for evaluation of T1DM.    Former patient of [facility], most recently seen by Amira Collins PA-C on 8/2024.    Historical Data:   12/2023 visit  Deirdre has their diabetes in great control based on HbA1c and CGM data reviewed. Discussed her fear of low after eating leading to missed boluses or under=dosing. I recommended that in those instances she set an alarm on her phone to remind her to bolus after eating. We also discussed extended bolus while in manual mode for those instances. She is going low when she tries to correct for a high or lets pump adjust automatic basal for her as well so we weakened correction factors slightly. May need to further weaken but lower target to prevent her from staying so high and also prevent further hypoglycemia. Labs ordered for next appointment.    She has a fungal nail on her right great toe nail, detached from the base so I referred her to Dr. Alvarenga for removal. Mom monitors blood sugars from home while Deirdre is at college and roommates  Subjective:       Patient ID: Thomas Sarkar is a 68 y.o. male.    Chief Complaint: Rheumatoid Arthritis      Thomas is back today for rheumatology followup.     We have followed thomas for seronegative RA with psoriasis overlap and chronic unspecified Colitis. He is followed by GI. His gi issues have been very quiet. He has been treated and failed mtx monotherapy and sulfasalazine. He has failed multiple tnf agents (remicade, enbrel, humira and cimzia),was on IV Orecnica 1000 mg Q 4 weeks  over 1 year at last visit dr booth moved him to Orencia sub Q 125 mg weekly dose to see if this would help better not much change. We tried xeljanz 11 mg/d but he developed diarrhea. We stopped this and moved to actemra 162 mg/wk sub Q. initially with mtx but his liver test went up a little so we stopped mtx and just use actemra monotherapy 162 mg/wk. After 4 weeks of actemra his liver test increased more. We held actemra. Repeat labs last week better but still elevated. On crestor also for a few years now. RA not doing well since off all meds. Rates his pain today 7/10 hands, hips, feet.      Chronic pain in his hands, hips, knees and shoulders. Chronic joint swelling.     He had sleep study done. The test reported pain at night interfering with his ability to enter into deep sleep. Recommends maximizing his meds and trying to better control his RA.       Orencia has worked the best of all the agents he has been on but we have never been able to achieve remission of his RA. Scalp psoriasis comes and goes, scalp pretty clear right now. He reports never having any issues with diverticulitis in the past.  He no longer takes any pain meds they did not help. Now on gabapentin and Cymbalta  daily to help some to reduce pain. No side effects.             Psoriasis   Associated symptoms include arthralgias, joint swelling and myalgias. Pertinent negatives include no abdominal pain, chest pain, chills, congestion, coughing,  "fatigue, fever, nausea, numbness, rash, vomiting or weakness.   Osteoarthritis   Associated symptoms include arthralgias, joint swelling and myalgias. Pertinent negatives include no abdominal pain, chest pain, chills, congestion, coughing, fatigue, fever, nausea, numbness, rash, vomiting or weakness.     Review of Systems   Constitutional: Negative.  Negative for chills, fatigue and fever.   HENT: Negative.  Negative for congestion, mouth sores and trouble swallowing.    Eyes: Negative.  Negative for photophobia, redness and visual disturbance.   Respiratory: Negative.  Negative for cough, shortness of breath and wheezing.    Cardiovascular: Negative.  Negative for chest pain and leg swelling.   Gastrointestinal: Negative.  Negative for abdominal distention, abdominal pain, anal bleeding, blood in stool, constipation, diarrhea, nausea, rectal pain and vomiting.   Genitourinary: Negative.  Negative for dysuria, flank pain, frequency and urgency.   Musculoskeletal: Positive for arthralgias, joint swelling and myalgias. Negative for back pain and gait problem.   Skin: Negative for color change, pallor and rash.   Neurological: Negative.  Negative for dizziness, weakness and numbness.         Objective:     /72   Pulse 62   Ht 5' 7" (1.702 m)   Wt 108.8 kg (239 lb 13.8 oz)   BMI 37.57 kg/m²      Physical Exam   Constitutional: He is oriented to person, place, and time and well-developed, well-nourished, and in no distress. No distress.   HENT:   Head: Normocephalic and atraumatic.   Right Ear: External ear normal.   Left Ear: External ear normal.   Mouth/Throat: No oropharyngeal exudate.   Eyes: Conjunctivae and EOM are normal. Pupils are equal, round, and reactive to light. No scleral icterus.   Neck: Neck supple. No thyromegaly present.   Cardiovascular: Normal rate, regular rhythm and normal heart sounds.    No murmur heard.  Pulmonary/Chest: Effort normal and breath sounds normal. No respiratory distress. " helping with low blood sugars as well. Reviewed sick day protocol.       8/5/24  Bolus before eating.  Listen to the \"vibe\" that you forgot to bolus  Sharpen CHO counting  If forgotten bolus-  >1 hours-  only correct  do not enter CHOs   Suggest ICF drop all by 5  ICR try 8.5????    Target-  threshold--  drop all by 10  Revisit podiatry for toe nail fungus  I refilled Penlac x 1.  It's not working.  Therefore revisit podiatry    Interval Hx;   Realized she has been turning her phone off before the bolus gets processed and sent  Snacking at night and overbolusing - not counting accurately  Needs refills       Current diabetes treatment:    Omnipod 5 + G6          Pump download  SMBG/Glucometer/CGM:         Interpretation: Patient has hyperglycemia. There is no hypoglycemia. Based on CGM glucose current DM therapy is adjusted           Microvascular and Macrovascular complications:  Neuropathy: no numbness, tingling in b/l feet  Retinopathy - No, Last Eye exam: overdue   Nephropathy: No not on ACEI / ARB  No components found for: \"MICROALBUR\", \"[id number]\"  Lipids:  not on statin  CAD: No  Hypertension present: No  Hyperlipidemia present: No  Peripheral Vascular Disease present: No  CVD: No  DM foot: amputation No , no ulcers         Hypoglycemic awareness:  Yes  Frequency of lows:  occasionally  Glucagon kit at home (if using insulin):  Yes  Medic alert identification:  No            Review of Systems   As in HPI all other systems negative     Social History     Tobacco Use   • Smoking status: Never   • Smokeless tobacco: Never   Vaping Use   • Vaping status: never used   Substance Use Topics   • Alcohol use: Never   • Drug use: Never     Past Medical History:   Diagnosis Date   • COVID-19 05/2021   • Migraines    • Type 1 diabetes mellitus without complication (CMD) 1/27/2023      Past Surgical History:   Procedure Laterality Date   • No past surgeries     • [place] tooth extraction  01/2024    All four      Family    Abdominal: Soft. Bowel sounds are normal.       Right Side Rheumatological Exam     Examination finds the elbow, knee, 1st PIP, 1st MCP, 4th MCP, 5th PIP and 5th MCP normal.    The patient is tender to palpation of the shoulder, wrist, 2nd PIP, 2nd MCP, 3rd PIP and 4th PIP    He has swelling of the wrist and 2nd PIP    Left Side Rheumatological Exam     Examination finds the elbow, knee, 1st PIP, 1st MCP, 2nd PIP, 4th MCP and 5th MCP normal.    The patient is tender to palpation of the shoulder, wrist, 2nd MCP, 3rd PIP and 3rd MCP.    He has swelling of the 2nd MCP      Lymphadenopathy:     He has no cervical adenopathy.   Neurological: He is alert and oriented to person, place, and time. No cranial nerve deficit. He exhibits normal muscle tone. Gait normal. Coordination normal.   Skin: Skin is warm and dry. No rash noted.          Musculoskeletal: He exhibits edema and tenderness.   Puffiness to several fingers and pip joints with tenderness noted on exam  Decreased range of motion pip joints                  Recent Results (from the past 1008 hour(s))   Sedimentation rate, manual    Collection Time: 07/30/18  8:11 AM   Result Value Ref Range    Sed Rate 2 0 - 10 mm/Hr   C-reactive protein    Collection Time: 07/30/18  8:11 AM   Result Value Ref Range    CRP 0.3 0.0 - 8.2 mg/L   Comprehensive metabolic panel    Collection Time: 07/30/18  8:11 AM   Result Value Ref Range    Sodium 139 136 - 145 mmol/L    Potassium 4.5 3.5 - 5.1 mmol/L    Chloride 106 95 - 110 mmol/L    CO2 23 23 - 29 mmol/L    Glucose 115 (H) 70 - 110 mg/dL    BUN, Bld 20 8 - 23 mg/dL    Creatinine 1.1 0.5 - 1.4 mg/dL    Calcium 9.0 8.7 - 10.5 mg/dL    Total Protein 7.2 6.0 - 8.4 g/dL    Albumin 3.9 3.5 - 5.2 g/dL    Total Bilirubin 1.5 (H) 0.1 - 1.0 mg/dL    Alkaline Phosphatase 98 55 - 135 U/L    AST 84 (H) 10 - 40 U/L     (H) 10 - 44 U/L    Anion Gap 10 8 - 16 mmol/L    eGFR if African American >60 >60 mL/min/1.73 m^2    eGFR if non  History   Problem Relation Age of Onset   • Hypothyroid Maternal Grandmother    • Hypertension Paternal Grandmother    • Macular degeneration Paternal Grandmother       Current Outpatient Medications   Medication Sig Dispense Refill   • Continuous Glucose Transmitter (Dexcom G6 Transmitter) Misc Apply 1 Device topically every 3 months. 1 each 1   • Continuous Glucose Sensor (Dexcom G6 Sensor) Misc INSERT AND CHANGE NEW SENSOR EVERY 10 DAYS 9 each 1   • Insulin Disposable Pump (Omnipod 5 G6 Pods, Gen 5,) Misc Change POD every 2 days 45 each 1   • ciclopirox (PENLAC) 8 % topical solution Apply 1 Application topically nightly. Apply to affected area(s) nightly until 1 week after clinical resolution. Last refill from Endo, needs Rx from Podiatry. 6.6 mL 0   • insulin lispro (HumaLOG) 100 UNIT/ML injectable solution INJECT UP TO 90 UNITS UNDER THE SKIN DAILY 90 mL 1   • Ketostix strip Use PRN to check ketones when ill or BG greater than 250 for 2 hours 50 each 1   • insulin glargine (Lantus SoloStar) 100 UNIT/ML pen-injector Inject up to 30 units per day in case of pump failure 15 mL 1   • fluticasone-salmeterol (ADVAIR HFA) 45-21 MCG/ACT inhaler Inhale 2 puffs into the lungs.     • PreviDent 5000 Booster Plus 1.1 % dental paste      • Glucagon (Baqsimi One Pack) 3 MG/DOSE Powder Call 911.  Place 1 spray in 1 nostril.  If no response in 15 minutes, place 1 more spray in nostril with new device. 1 each 1   • cetirizine (ZyrTEC Allergy) 10 MG tablet Take 10 mg by mouth daily.     • Insulin Disposable Pump (Omnipod 5 G6 Intro, Gen 5,) Kit 1 each as directed. 1 kit 0     No current facility-administered medications for this visit.      ALLERGIES:   Allergen Reactions   • Acetaminophen HIVES         Physical Exam    Vitals:    12/19/24 1415   BP: 123/85   BP Location: RUE - Right upper extremity   Patient Position: Sitting   Cuff Size: Regular   Pulse: 69   Temp: 97.5 °F (36.4 °C)   TempSrc: Temporal   SpO2: 98%   Weight:  75.7 kg (166 lb 12.5 oz)   Height: 5' 7\" (1.702 m)        Appear: Alert, well groomed, undistressed   CVS:  S1, S2 normal, no murmur   Lungs: CTAB   Skin: no rashes   CNS: no focal deficits noted, reflexes normal   Musculoskeletal: no defects are noted   Psych: AOx3          Laboratory, Radiographic, Cystopathologic Data     No results found for: \"HGBA1C\"  CHOLESTEROL (mg/dL)   Date Value   03/11/2024 143     HDL (mg/dL)   Date Value   03/11/2024 45     CALCULATED LDL (mg/dL)   Date Value   03/11/2024 83     TRIGLYCERIDE (mg/dL)   Date Value   03/11/2024 76     No results found for: \"LDLDIR\"  Creatinine (mg/dL)   Date Value   06/27/2022 0.76     No results found for: \"GFRNA\"  No results found for: \"GFRA\"  MICROALBUMIN/CREATININE (mg/g creat)   Date Value   03/11/2024 7       5/2024-  A1C 7.6  8/5/24  A1C 7.3  GMI 7.9      Assessment and Plan        Type 1 Diabetes with hyperglycemia    Insulin Pump titration   Use of self-applied continuous glucose monitor     Individualized glycemic goal - 6.5%  Current POC A1c 6.6%  GMI 7.8 TIR 55%     Treatment recommendations:  For better glycemic control needs intensification of current therapy   - discussed timing of bolusing to prevent elevations before they happen  - discussed focusing on carb counts and accuracy  - Rx done   - UTD on labs   -Patient will call if problems with blood sugars prior to next clinic visit.      DM complication screening:   - last diabetic foot exam: 8/2024  - last diabetic eye exam: overdue  - last diabetic nephropathy screen: UACR 7 in 3/2024;      Home glucose monitoring:   -  Check SMBG at least 4 times/day, if has CGM, needs to scan for BG 7 times a day, fasting, premeal, 1 to 2 hour postmeal, bedtime, and whenever feeling BG low.   -  BG target fasting or premeal 80 to 130, and 1 to 2 hour postprandial < 180.   - advised to call me if BG < 70 or > than 300 and not coming down.      Hemoglobin A1C every three months.   Extended counseling on  African American >60 >60 mL/min/1.73 m^2   CBC auto differential    Collection Time: 07/30/18  8:11 AM   Result Value Ref Range    WBC 4.82 3.90 - 12.70 K/uL    RBC 4.43 (L) 4.60 - 6.20 M/uL    Hemoglobin 13.8 (L) 14.0 - 18.0 g/dL    Hematocrit 41.7 40.0 - 54.0 %    MCV 94 82 - 98 fL    MCH 31.2 (H) 27.0 - 31.0 pg    MCHC 33.1 32.0 - 36.0 g/dL    RDW 14.1 11.5 - 14.5 %    Platelets 133 (L) 150 - 350 K/uL    MPV 11.6 9.2 - 12.9 fL    Gran # (ANC) 2.1 1.8 - 7.7 K/uL    Lymph # 2.0 1.0 - 4.8 K/uL    Mono # 0.6 0.3 - 1.0 K/uL    Eos # 0.2 0.0 - 0.5 K/uL    Baso # 0.03 0.00 - 0.20 K/uL    Gran% 42.6 38.0 - 73.0 %    Lymph% 41.1 18.0 - 48.0 %    Mono% 11.8 4.0 - 15.0 %    Eosinophil% 3.9 0.0 - 8.0 %    Basophil% 0.6 0.0 - 1.9 %    Differential Method Automated    Comprehensive metabolic panel    Collection Time: 08/16/18  7:32 AM   Result Value Ref Range    Sodium 144 136 - 145 mmol/L    Potassium 5.0 3.5 - 5.1 mmol/L    Chloride 108 95 - 110 mmol/L    CO2 27 23 - 29 mmol/L    Glucose 127 (H) 70 - 110 mg/dL    BUN, Bld 17 8 - 23 mg/dL    Creatinine 1.1 0.5 - 1.4 mg/dL    Calcium 9.3 8.7 - 10.5 mg/dL    Total Protein 7.1 6.0 - 8.4 g/dL    Albumin 3.9 3.5 - 5.2 g/dL    Total Bilirubin 1.6 (H) 0.1 - 1.0 mg/dL    Alkaline Phosphatase 74 55 - 135 U/L     (H) 10 - 40 U/L     (H) 10 - 44 U/L    Anion Gap 9 8 - 16 mmol/L    eGFR if African American >60 >60 mL/min/1.73 m^2    eGFR if non African American >60 >60 mL/min/1.73 m^2   CBC auto differential    Collection Time: 08/16/18  7:32 AM   Result Value Ref Range    WBC 5.07 3.90 - 12.70 K/uL    RBC 4.43 (L) 4.60 - 6.20 M/uL    Hemoglobin 13.8 (L) 14.0 - 18.0 g/dL    Hematocrit 41.2 40.0 - 54.0 %    MCV 93 82 - 98 fL    MCH 31.2 (H) 27.0 - 31.0 pg    MCHC 33.5 32.0 - 36.0 g/dL    RDW 13.8 11.5 - 14.5 %    Platelets 140 (L) 150 - 350 K/uL    MPV 11.0 9.2 - 12.9 fL    Gran # (ANC) 2.1 1.8 - 7.7 K/uL    Lymph # 2.2 1.0 - 4.8 K/uL    Mono # 0.5 0.3 - 1.0 K/uL    Eos #  0.3 0.0 - 0.5 K/uL    Baso # 0.03 0.00 - 0.20 K/uL    Gran% 41.4 38.0 - 73.0 %    Lymph% 44.2 18.0 - 48.0 %    Mono% 8.9 4.0 - 15.0 %    Eosinophil% 4.9 0.0 - 8.0 %    Basophil% 0.6 0.0 - 1.9 %    Differential Method Automated    Sedimentation rate, manual    Collection Time: 08/30/18  7:55 AM   Result Value Ref Range    Sed Rate 1 0 - 10 mm/Hr   C-reactive protein    Collection Time: 08/30/18  7:55 AM   Result Value Ref Range    CRP 0.6 0.0 - 8.2 mg/L   Comprehensive metabolic panel    Collection Time: 08/30/18  7:55 AM   Result Value Ref Range    Sodium 141 136 - 145 mmol/L    Potassium 4.6 3.5 - 5.1 mmol/L    Chloride 106 95 - 110 mmol/L    CO2 24 23 - 29 mmol/L    Glucose 142 (H) 70 - 110 mg/dL    BUN, Bld 18 8 - 23 mg/dL    Creatinine 1.1 0.5 - 1.4 mg/dL    Calcium 9.0 8.7 - 10.5 mg/dL    Total Protein 6.9 6.0 - 8.4 g/dL    Albumin 3.9 3.5 - 5.2 g/dL    Total Bilirubin 1.3 (H) 0.1 - 1.0 mg/dL    Alkaline Phosphatase 86 55 - 135 U/L    AST 76 (H) 10 - 40 U/L     (H) 10 - 44 U/L    Anion Gap 11 8 - 16 mmol/L    eGFR if African American >60 >60 mL/min/1.73 m^2    eGFR if non African American >60 >60 mL/min/1.73 m^2   CBC auto differential    Collection Time: 08/30/18  7:55 AM   Result Value Ref Range    WBC 4.52 3.90 - 12.70 K/uL    RBC 4.50 (L) 4.60 - 6.20 M/uL    Hemoglobin 13.8 (L) 14.0 - 18.0 g/dL    Hematocrit 41.6 40.0 - 54.0 %    MCV 92 82 - 98 fL    MCH 30.7 27.0 - 31.0 pg    MCHC 33.2 32.0 - 36.0 g/dL    RDW 13.5 11.5 - 14.5 %    Platelets 138 (L) 150 - 350 K/uL    MPV 11.5 9.2 - 12.9 fL    Gran # (ANC) 2.1 1.8 - 7.7 K/uL    Lymph # 1.8 1.0 - 4.8 K/uL    Mono # 0.4 0.3 - 1.0 K/uL    Eos # 0.2 0.0 - 0.5 K/uL    Baso # 0.04 0.00 - 0.20 K/uL    Gran% 47.1 38.0 - 73.0 %    Lymph% 38.7 18.0 - 48.0 %    Mono% 8.2 4.0 - 15.0 %    Eosinophil% 5.1 0.0 - 8.0 %    Basophil% 0.9 0.0 - 1.9 %    Differential Method Automated      Results for TRI WILL (MRN [id number]) as of 9/5/2018 08:28   Ref. Range 5/10/2018  disease management. Maintain log and check 2 hour post-prandial sugars.   See current meds.   Will check labs as listed and make necessary based on labs.   I reviewed meds and home blood sugars.   Annual comprehensive exam. Annual eye exam. Detailed foot exam review and podiatric care as is appropriate.   Annual microalbumin, lipids and flu vaccine.         4. Hyperlipidemia: Review of recent lipid panel reveals:    CALCULATED LDL (mg/dL)   Date Value   03/11/2024 83       -Treatment recommendations:  lifestyle management      5. Hypertension:  -Blood pressure today: within target.    -Treatment recommendations:  lifestyle management    6. Weight: Body mass index is 26.12 kg/m².    -I recommended maintenance    Orders Placed This Encounter   • POCT Glycohemoglobin Analyzer           We had an extensive discussion about pathophysiology of DM, risks of macrovascular and microvascular complication requiring optimal glycemic control and monitoring and diet. We also discussed symptoms, recognition and treatment of hypoglycemia.      Prognosis: Discussed  Compliance: Discussed  Risk Reduction: Discussed  Risks & Benefits of tx: Discussed  Hypoglycemia education/ awareness/ recognition/ treatment Discussed      I have spent 30 minutes with downloading medical information from a 3rd party device, chart review,  lab review, blood sugar review, current medication list review and medical decision making for further dose changes, counseling and completion of documentation.      Return to clinic in 4 months      Gregoria Cain PA-C  Endocrinology, Diabetes and Metabolism   [facility] [place]  12/19/2024   12:37 6/26/2018 08:02 7/3/2018 07:41 7/30/2018 08:11 8/16/2018 07:32 8/30/2018 07:55   AST Latest Ref Range: 10 - 40 U/L 25 43 (H) 39 84 (H) 104 (H) 76 (H)   ALT Latest Ref Range: 10 - 44 U/L 31 78 (H) 59 (H) 107 (H) 120 (H) 102 (H)   CRP Latest Ref Range: 0.0 - 8.2 mg/L 2.2 6.8  0.3  0.6       Assessment:       1. Rheumatoid arthritis of multiple sites with negative rheumatoid factor    2. Elevated liver enzymes    3. Immunocompromised patient    4. Medication monitoring encounter    5. Psoriasis    6. Chronic colitis            1.  Seronegative rheumatoid arthritis -  Who has failed mtx monotheraoy, failed Enbrel, Remicade, Humira, cimzia- IV Orencia 1000 mg every 4,  Orencia 125 mg SC weekly injection, and xeljanz along with mtx 20 mg weekly    All meds on hold mtx and acterma 162 mg/wk  due to elevated lft  Liver test up , - now down on AST 76 and  off meds   Drop in PLt from 198 to 133- now 138    Mild improvement in overall pain but still ongoing activity  still mod DA-  11 Tender/ 3 swollen - cdai 23, RENEE 1.2        2.  Skin psoriasis better on mtx and topical- no scalp rash today     3.  Chronic unspecified colitis  - not UC- Stable    4.  Osteoarthritis of both knees    5.  Vaccines up to date -had yearly flu vaccine    6.  Medication Monitoring- no current issues, no evidence of toxicity    7.  Immunocompromised no issues with recurrent infections    8.  Chronic pain on gabapentin 600 mg tid and cymbalta 60 mg/d    9. Nocturnal pain issues interfering with normal sleep pattern contributing to daytime sleepiness         Plan:         Will discontinue actemra and remain off mtx  Hold crestor until his liver test return to normal     Repeat hepatic function panel and cbc in 2 weeks  Ultrasound liver- limited abd US    If they continue to trend down towards normal then I feel best to resume Orencia since he did fairly well from RA standpoint and will not aggravate the liver  I am not sure  he would not get liver irritation from --Olumiant (baracitinib)    Avoid steroid with his gluacoma    C/w gabapentin to 600 mg, keep morning and afternoon dose at  300 mg   Can go up to 900 mg at night if needed    Next visit consider increasing his Cymbalta to  60 mg bid but for now will c/w once daily     Repeat kaushik hand and foot X-rays in 1.5-2 years    rtc 6  weeks with labs (cbc,cmp, esr, crp)-1 wk prior       call with any questions, changes, or concerns